# Patient Record
Sex: MALE | Race: WHITE | NOT HISPANIC OR LATINO | Employment: OTHER | URBAN - METROPOLITAN AREA
[De-identification: names, ages, dates, MRNs, and addresses within clinical notes are randomized per-mention and may not be internally consistent; named-entity substitution may affect disease eponyms.]

---

## 2017-01-27 DIAGNOSIS — N40.1 BPH (BENIGN PROSTATIC HYPERTROPHY) WITH URINARY OBSTRUCTION: ICD-10-CM

## 2017-01-27 DIAGNOSIS — N13.8 BPH (BENIGN PROSTATIC HYPERTROPHY) WITH URINARY OBSTRUCTION: ICD-10-CM

## 2017-01-30 RX ORDER — TAMSULOSIN HYDROCHLORIDE 0.4 MG/1
CAPSULE ORAL
Qty: 90 CAPSULE | Refills: 0 | Status: SHIPPED | OUTPATIENT
Start: 2017-01-30 | End: 2017-04-27 | Stop reason: SDUPTHER

## 2017-02-17 ENCOUNTER — OFFICE VISIT (OUTPATIENT)
Dept: PODIATRY | Facility: CLINIC | Age: 82
End: 2017-02-17
Payer: MEDICARE

## 2017-02-17 VITALS
BODY MASS INDEX: 34.71 KG/M2 | RESPIRATION RATE: 18 BRPM | HEIGHT: 68 IN | HEART RATE: 69 BPM | DIASTOLIC BLOOD PRESSURE: 71 MMHG | WEIGHT: 229 LBS | SYSTOLIC BLOOD PRESSURE: 133 MMHG

## 2017-02-17 DIAGNOSIS — M79.671 FOOT PAIN, RIGHT: Primary | ICD-10-CM

## 2017-02-17 DIAGNOSIS — Q82.8 POROKERATOSIS: ICD-10-CM

## 2017-02-17 PROCEDURE — 99213 OFFICE O/P EST LOW 20 MIN: CPT | Mod: 25,S$PBB,, | Performed by: PODIATRIST

## 2017-02-17 PROCEDURE — 99213 OFFICE O/P EST LOW 20 MIN: CPT | Mod: PBBFAC | Performed by: PODIATRIST

## 2017-02-17 PROCEDURE — 29540 STRAPPING ANKLE &/FOOT: CPT | Mod: S$PBB,RT,, | Performed by: PODIATRIST

## 2017-02-17 PROCEDURE — 29540 STRAPPING ANKLE &/FOOT: CPT | Mod: PBBFAC,RT | Performed by: PODIATRIST

## 2017-02-17 PROCEDURE — 99999 PR PBB SHADOW E&M-EST. PATIENT-LVL III: CPT | Mod: PBBFAC,,, | Performed by: PODIATRIST

## 2017-02-17 RX ORDER — UREA 450 MG/G
1 CREAM TOPICAL 2 TIMES DAILY
Qty: 255 G | Refills: 11 | Status: SHIPPED | OUTPATIENT
Start: 2017-02-17 | End: 2017-06-13

## 2017-02-17 NOTE — MR AVS SNAPSHOT
[facility] - Podiatry  [address]  [facility] [address]  Phone: [phone]                  Grey Vance Jr.   2017 1:30 PM   Office Visit    Description:  Male : 1935   Provider:  Sajan Spangler DPM   Department:  [facility]           Reason for Visit     Nail Problem     Ingrown Toenail     Foot Pain           Diagnoses this Visit        Comments    Foot pain, right    -  Primary     Porokeratosis                To Do List           Goals (5 Years of Data)     None      Follow-Up and Disposition     Return if symptoms worsen or fail to improve.       These Medications        Disp Refills Start End    urea 45 % Crea 255 g 11 2017     Apply 1 application topically 2 (two) times daily. - Topical (Top)    Pharmacy: [facility] [id number]  [place] [address] AT [facility] [address] Ph #: [phone]         [facility] On Call     [facility] On Call Nurse Care Line -  Assistance  Registered nurses in the [facility] provide clinical advisement, health education, appointment booking, and other advisory services.  Call for this free service at [phone].             Medications           Message regarding Medications     Verify the changes and/or additions to your medication regime listed below are the same as discussed with your clinician today.  If any of these changes or additions are incorrect, please notify your healthcare provider.        START taking these NEW medications        Refills    urea 45 % Crea 11    Sig: Apply 1 application topically 2 (two) times daily.    Class: Normal    Route: Topical (Top)           Verify that the below list of medications is an accurate representation of the medications you are currently taking.  If none reported, the list may be blank. If incorrect, please contact your healthcare provider. Carry this list with you in case of emergency.           Current Medications     ascorbic acid  "(VITAMIN C) 500 MG tablet Take 500 mg by mouth once daily.    aspirin (ECOTRIN) 81 MG EC tablet Take 81 mg by mouth once daily.    CALCIUM CITRATE/VITAMIN D2 (CALCIUM CITRATE WITH D ORAL) Take by mouth once daily.    CHOLECALCIFEROL, VITAMIN D3, (VITAMIN D3 ORAL) Take 10,000 Units by mouth once a week. Take one weekly    folic acid (FOLVITE) 1 MG tablet Take 1 mg by mouth once daily.    GLUCOSAM SUL NA/CHONDR LATHAM A NA (GLUCOSAMINE-CHONDROITIN 3X ORAL) Take 2 tablets by mouth once daily.    multivitamin (ONE DAILY MULTIVITAMIN) per tablet Take 1 tablet by mouth once daily.    sildenafil (VIAGRA) 100 MG tablet Take 1 tablet (100 mg total) by mouth daily as needed for Erectile Dysfunction.    simvastatin (ZOCOR) 40 MG tablet TAKE 1/2 TABLET BY MOUTH EVERY EVENING    tamsulosin (FLOMAX) 0.4 mg Cp24 TAKE 1 CAPSULE BY MOUTH EVERY EVENING    urea 45 % Crea Apply 1 application topically 2 (two) times daily.           Clinical Reference Information           Your Vitals Were     BP Pulse Resp Height Weight BMI    133/71 69 18 5' 8" (1.727 m) 103.9 kg (229 lb) 34.82 kg/m2      Blood Pressure          Most Recent Value    BP  133/71      Allergies as of 2/17/2017     Bee Pollens      Immunizations Administered on Date of Encounter - 2/17/2017     None      Language Assistance Services     ATTENTION: Language assistance services are available, free of charge. Please call [phone].      ATENCIÓN: Si habla español, tiene a latham disposición servicios gratuitos de asistencia lingüística. Llame al [phone].     BABITA Ý: N?u b?n nói Ti?ng Vi?t, có các d?ch v? h? tr? ngôn ng? mi?n phí dành cho b?n. G?i s? [phone].         [facility] complies with applicable Federal civil rights laws and does not discriminate on the basis of race, color, national origin, age, disability, or sex.        "

## 2017-02-17 NOTE — PROGRESS NOTES
Subjective:      Patient ID: Grey Vance Jr. is a 81 y.o. male.    Chief Complaint: Nail Problem (bilateral great toenails ); Ingrown Toenail; and Foot Pain (Rt heel painful callouse )    burning/sharp pain inferior and central right heel with skin lesion associated.  Denies possible scar from puncture wound, foreign body, and previous surgery to area.  Gradual onset, worsening over past several weeks, aggravated by increased weight bearing, shoe gear, pressure.  No previous medical treatment.  OTC med not helping.        Review of Systems   Constitution: Negative for chills, diaphoresis, fever, malaise/fatigue and night sweats.   Cardiovascular: Negative for claudication, cyanosis, leg swelling and syncope.   Skin: Positive for suspicious lesions. Negative for color change, dry skin, rash and unusual hair distribution.   Musculoskeletal: Negative for falls, joint pain, joint swelling, muscle cramps, muscle weakness and stiffness.   Gastrointestinal: Negative for constipation, diarrhea, nausea and vomiting.   Neurological: Negative for brief paralysis, disturbances in coordination, focal weakness, numbness, paresthesias, sensory change and tremors.           Objective:      Physical Exam   Constitutional: He appears well-developed and well-nourished. He is cooperative. No distress.   Cardiovascular:   Pulses:       Popliteal pulses are 2+ on the right side, and 2+ on the left side.        Dorsalis pedis pulses are 1+ on the right side, and 1+ on the left side.        Posterior tibial pulses are 1+ on the right side, and 1+ on the left side.   Capillary refill 3 seconds all toes/distal feet, all toes/both feet warm to touch.      Negative lymphadenopathy bilateral popliteal fossa and tarsal tunnel.      Negavie lower extremity edema bilateral.     Musculoskeletal:        Right ankle: Normal. He exhibits normal range of motion, no swelling, no ecchymosis, no deformity, no laceration and normal pulse. Achilles  tendon normal. Achilles tendon exhibits no pain, no defect and normal Barton's test results.   Normal angle, base, station of gait. All ten toes without clubbing, cyanosis, or signs of ischemia.  No pain to palpation bilateral lower extremities.  Range of motion, stability, muscle strength, and muscle tone normal bilateral feet and legs.     Lymphadenopathy: No inguinal adenopathy noted on the right or left side.   Negative lymphadenopathy bilateral popliteal fossa and tarsal tunnel.   Neurological: He is alert. He has normal strength. He displays no atrophy and no tremor. No sensory deficit. He exhibits normal muscle tone. He displays no seizure activity. Gait normal.   Reflex Scores:       Patellar reflexes are 2+ on the right side and 2+ on the left side.       Achilles reflexes are 2+ on the right side and 2+ on the left side.  Negative tinel sign to percussion sural, superficial peroneal, deep peroneal, saphenous, and posterior tibial nerves right and left ankles and feet.     Skin: Skin is warm, dry and intact. No abrasion, no bruising, no burn, no ecchymosis, no laceration, no lesion and no rash noted. He is not diaphoretic. No cyanosis or erythema. No pallor. Nails show no clubbing.     Focal hyperkeratotic lesion consisting entirely of hyperkeratotic tissue and deep central hyperkeratotic center without open skin, drainage, pus, fluctuance, malodor, or signs of infection central inferior right heel.    Otherwise, Skin is normal age and health appropriate color, turgor, texture, and temperature bilateral lower extremities without ulceration, hyperpigmentation, discoloration, masses nodules or cords palpated.  No ecchymosis, erythema, edema, or cardinal signs of infection bilateral lower extremities.               Assessment:       Encounter Diagnoses   Name Primary?    Foot pain, right Yes    Porokeratosis          Plan:       Grey was seen today for nail problem, ingrown toenail and foot  pain.    Diagnoses and all orders for this visit:    Foot pain, right    Porokeratosis    Other orders  -     urea 45 % Crea; Apply 1 application topically 2 (two) times daily.      I counseled the patient on his conditions, their implications and medical management.        Rx urea.    Discussed conservative treatment with shoes of adequate dimensions, material, and style to alleviate symptoms and delay or prevent surgical intervention.    I applied a plantar rest strapping to the patient's foot to offload symptomatic area, support the arch, and relieve pain.            Return if symptoms worsen or fail to improve.

## 2017-02-24 DIAGNOSIS — E78.5 HYPERLIPIDEMIA: ICD-10-CM

## 2017-02-24 RX ORDER — SIMVASTATIN 40 MG/1
TABLET, FILM COATED ORAL
Qty: 90 TABLET | Refills: 0 | Status: SHIPPED | OUTPATIENT
Start: 2017-02-24 | End: 2017-08-24 | Stop reason: SDUPTHER

## 2017-03-14 ENCOUNTER — TELEPHONE (OUTPATIENT)
Dept: INTERNAL MEDICINE | Facility: CLINIC | Age: 82
End: 2017-03-14

## 2017-03-15 ENCOUNTER — TELEPHONE (OUTPATIENT)
Dept: INTERNAL MEDICINE | Facility: CLINIC | Age: 82
End: 2017-03-15

## 2017-03-15 DIAGNOSIS — N40.1 BPH (BENIGN PROSTATIC HYPERTROPHY) WITH URINARY OBSTRUCTION: Primary | ICD-10-CM

## 2017-03-15 DIAGNOSIS — Z12.5 SCREENING FOR PROSTATE CANCER: ICD-10-CM

## 2017-03-15 DIAGNOSIS — E78.5 HYPERLIPIDEMIA, UNSPECIFIED HYPERLIPIDEMIA TYPE: ICD-10-CM

## 2017-03-15 DIAGNOSIS — E55.9 MILD VITAMIN D DEFICIENCY: ICD-10-CM

## 2017-03-15 DIAGNOSIS — R73.03 PRE-DIABETES: ICD-10-CM

## 2017-03-15 DIAGNOSIS — N18.30 CKD (CHRONIC KIDNEY DISEASE) STAGE 3, GFR 30-59 ML/MIN: ICD-10-CM

## 2017-03-15 DIAGNOSIS — R73.01 FASTING HYPERGLYCEMIA: ICD-10-CM

## 2017-03-15 DIAGNOSIS — N13.8 BPH (BENIGN PROSTATIC HYPERTROPHY) WITH URINARY OBSTRUCTION: Primary | ICD-10-CM

## 2017-04-05 ENCOUNTER — PATIENT MESSAGE (OUTPATIENT)
Dept: INTERNAL MEDICINE | Facility: CLINIC | Age: 82
End: 2017-04-05

## 2017-04-06 ENCOUNTER — PATIENT MESSAGE (OUTPATIENT)
Dept: INTERNAL MEDICINE | Facility: CLINIC | Age: 82
End: 2017-04-06

## 2017-04-06 NOTE — TELEPHONE ENCOUNTER
Please call patient and tell him to request the medication through his wife's portal as I can not review or order through his portal.

## 2017-04-06 NOTE — TELEPHONE ENCOUNTER
Spoke to patient who stated he tried to access his wife [facility] and could not that was the reason he used his [facility]. I was able to provide him with the phone number to call and get assistance and to resend the message through Mrs. Vance's page.

## 2017-04-27 ENCOUNTER — PATIENT MESSAGE (OUTPATIENT)
Dept: INTERNAL MEDICINE | Facility: CLINIC | Age: 82
End: 2017-04-27

## 2017-04-27 DIAGNOSIS — N13.8 BPH (BENIGN PROSTATIC HYPERTROPHY) WITH URINARY OBSTRUCTION: ICD-10-CM

## 2017-04-27 DIAGNOSIS — N40.1 BPH (BENIGN PROSTATIC HYPERTROPHY) WITH URINARY OBSTRUCTION: ICD-10-CM

## 2017-04-27 RX ORDER — TAMSULOSIN HYDROCHLORIDE 0.4 MG/1
1 CAPSULE ORAL NIGHTLY
Qty: 30 CAPSULE | Refills: 0 | Status: SHIPPED | OUTPATIENT
Start: 2017-04-27 | End: 2017-05-24 | Stop reason: SDUPTHER

## 2017-05-24 DIAGNOSIS — N13.8 BPH (BENIGN PROSTATIC HYPERTROPHY) WITH URINARY OBSTRUCTION: ICD-10-CM

## 2017-05-24 DIAGNOSIS — N40.1 BPH (BENIGN PROSTATIC HYPERTROPHY) WITH URINARY OBSTRUCTION: ICD-10-CM

## 2017-05-24 RX ORDER — TAMSULOSIN HYDROCHLORIDE 0.4 MG/1
CAPSULE ORAL
Qty: 30 CAPSULE | Refills: 0 | Status: SHIPPED | OUTPATIENT
Start: 2017-05-24 | End: 2017-05-24 | Stop reason: SDUPTHER

## 2017-05-25 RX ORDER — TAMSULOSIN HYDROCHLORIDE 0.4 MG/1
CAPSULE ORAL
Qty: 90 CAPSULE | Refills: 0 | Status: SHIPPED | OUTPATIENT
Start: 2017-05-25 | End: 2017-08-24 | Stop reason: SDUPTHER

## 2017-05-29 ENCOUNTER — DOCUMENTATION ONLY (OUTPATIENT)
Dept: INTERNAL MEDICINE | Facility: CLINIC | Age: 82
End: 2017-05-29

## 2017-05-29 NOTE — PROGRESS NOTES
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      PMH:    1. HPL              Simvastatin 20 mg hs              10 yr ASCVD Risk = 24%    2. BPH              Flomax 0.4 mg/day     3. ED      4. Pre-DM    5. Obesity (BMI-34)    6. Hx of Vit D Deficiency              Cholecalciferol 10,000 units/ week      7. CKD Stage 2-3    9. Partial Tear Right Rotator Cuff           Followed by Dr. Haley        10. Left Ulnar Neuropathy - Carpal Tunnel           Evaluated by Dr. Barbi Morfin 8/26/14           Rec: PT and NSAIDS    11. Mild DJD Left Hip        MEDS:            Ascorbic Acid 500 mg/day            ASA 81 mg/day            Calcium + D            Chlecalciferol 10,000 units weekly          Folic Acid 1 mg/day            Glucosamine Chondroitin - 2 daily            MVI            Flomax 0.4 mg/day            Simvastatin 20 mg hs       ALLERGIES:             Bee Pollen    Surgical Hx:             Tonsillectomy             Lipoma on Neck excision             Cataract Extraction OS             Vasectomy     Social Hx:              Smoking Hx: Former smoker. 3 PPD x 20 yrs. Quit 1969              ETOH: none              Diet: "portion-control diet" and avoids desserts              Exercise: Walks 1-2 miles per day              Work Hx: Retired               Home Environment: , 3 children, 4 grandchildren     Family Hx:              Negative       Preventive Health Screening & Recent Lab Results:               Annual PE: 5/3/16              CBC (4/2016): H/H - 13.9/41.6             CMP (4/2016): normal             TSH (3/2015): normal 1.792             Lipids (4/2016): Chol-134, " TG-72, HDL-471 LDL-78             Hgb A1c (9/2014): 5.7                           (4/2016): 5.8             Vit D Level (3/2015): 32             Eye Exam - 4/2015               Colonoscopy - never had, declines             Immunizations                      Influenza 9/2016                      Td - 2006                      Pneumovax - 4/2/14                      Prevnar 13 - 11/3/15                    Zostovax - declined              Other:                      PSA (5/7/13) - 0.91                            (3/2015) - 4.4                            (7/2015) - 1.20                    Uric Acid (4/2014): normal 6.3                    X-Ray Left Hand (8/2014): mild DJD PIP & DIP jts                    X-Ray L-Hip (10/2013): mild DJD                    MRI R-Shoulder (9/2015): partial rotator cuff tear    IMP:  1. HPL   2. Pre-DM  3. BPH  4. Obesity  5. CKD - Stage 3  6. Vit D Deficiency  7. Patial Tear R-Rotator Cuff  8. Mild DJD L-Hip  9. ED  10. L-Ulnar Neuropathy & CTS  11. Annual PE    Plan:  1. Tdp

## 2017-06-08 ENCOUNTER — LAB VISIT (OUTPATIENT)
Dept: LAB | Facility: HOSPITAL | Age: 82
End: 2017-06-08
Attending: INTERNAL MEDICINE
Payer: MEDICARE

## 2017-06-08 DIAGNOSIS — N18.30 CKD (CHRONIC KIDNEY DISEASE) STAGE 3, GFR 30-59 ML/MIN: ICD-10-CM

## 2017-06-08 DIAGNOSIS — R73.01 FASTING HYPERGLYCEMIA: ICD-10-CM

## 2017-06-08 DIAGNOSIS — E55.9 MILD VITAMIN D DEFICIENCY: ICD-10-CM

## 2017-06-08 DIAGNOSIS — E78.5 HYPERLIPIDEMIA, UNSPECIFIED HYPERLIPIDEMIA TYPE: ICD-10-CM

## 2017-06-08 DIAGNOSIS — Z12.5 SCREENING FOR PROSTATE CANCER: ICD-10-CM

## 2017-06-08 LAB
25(OH)D3+25(OH)D2 SERPL-MCNC: 42 NG/ML
ALBUMIN SERPL BCP-MCNC: 3.6 G/DL
ALP SERPL-CCNC: 63 U/L
ALT SERPL W/O P-5'-P-CCNC: 21 U/L
ANION GAP SERPL CALC-SCNC: 10 MMOL/L
AST SERPL-CCNC: 21 U/L
BASOPHILS # BLD AUTO: 0.04 K/UL
BASOPHILS NFR BLD: 0.6 %
BILIRUB SERPL-MCNC: 0.5 MG/DL
BUN SERPL-MCNC: 18 MG/DL
CALCIUM SERPL-MCNC: 9.8 MG/DL
CHLORIDE SERPL-SCNC: 106 MMOL/L
CHOLEST/HDLC SERPL: 3.2 {RATIO}
CO2 SERPL-SCNC: 23 MMOL/L
COMPLEXED PSA SERPL-MCNC: 1.1 NG/ML
CREAT SERPL-MCNC: 1.2 MG/DL
DIFFERENTIAL METHOD: NORMAL
EOSINOPHIL # BLD AUTO: 0.5 K/UL
EOSINOPHIL NFR BLD: 6.6 %
ERYTHROCYTE [DISTWIDTH] IN BLOOD BY AUTOMATED COUNT: 13.1 %
EST. GFR  (AFRICAN AMERICAN): >60 ML/MIN/1.73 M^2
EST. GFR  (NON AFRICAN AMERICAN): 56.8 ML/MIN/1.73 M^2
GLUCOSE SERPL-MCNC: 101 MG/DL
HCT VFR BLD AUTO: 41.6 %
HDL/CHOLESTEROL RATIO: 31.5 %
HDLC SERPL-MCNC: 146 MG/DL
HDLC SERPL-MCNC: 46 MG/DL
HGB BLD-MCNC: 14.2 G/DL
LDLC SERPL CALC-MCNC: 87.2 MG/DL
LYMPHOCYTES # BLD AUTO: 2.3 K/UL
LYMPHOCYTES NFR BLD: 31.8 %
MCH RBC QN AUTO: 30.9 PG
MCHC RBC AUTO-ENTMCNC: 34.1 %
MCV RBC AUTO: 90 FL
MONOCYTES # BLD AUTO: 0.5 K/UL
MONOCYTES NFR BLD: 7.6 %
NEUTROPHILS # BLD AUTO: 3.8 K/UL
NEUTROPHILS NFR BLD: 53.3 %
NONHDLC SERPL-MCNC: 100 MG/DL
PLATELET # BLD AUTO: 279 K/UL
PMV BLD AUTO: 9.9 FL
POTASSIUM SERPL-SCNC: 4.2 MMOL/L
PROT SERPL-MCNC: 7 G/DL
RBC # BLD AUTO: 4.6 M/UL
SODIUM SERPL-SCNC: 139 MMOL/L
TRIGL SERPL-MCNC: 64 MG/DL
TSH SERPL DL<=0.005 MIU/L-ACNC: 2.73 UIU/ML
WBC # BLD AUTO: 7.08 K/UL

## 2017-06-08 PROCEDURE — 80061 LIPID PANEL: CPT

## 2017-06-08 PROCEDURE — 84153 ASSAY OF PSA TOTAL: CPT

## 2017-06-08 PROCEDURE — 36415 COLL VENOUS BLD VENIPUNCTURE: CPT

## 2017-06-08 PROCEDURE — 80053 COMPREHEN METABOLIC PANEL: CPT

## 2017-06-08 PROCEDURE — 84443 ASSAY THYROID STIM HORMONE: CPT

## 2017-06-08 PROCEDURE — 82306 VITAMIN D 25 HYDROXY: CPT

## 2017-06-08 PROCEDURE — 83036 HEMOGLOBIN GLYCOSYLATED A1C: CPT

## 2017-06-08 PROCEDURE — 85025 COMPLETE CBC W/AUTO DIFF WBC: CPT

## 2017-06-09 LAB
ESTIMATED AVG GLUCOSE: 123 MG/DL
HBA1C MFR BLD HPLC: 5.9 %

## 2017-06-13 ENCOUNTER — OFFICE VISIT (OUTPATIENT)
Dept: INTERNAL MEDICINE | Facility: CLINIC | Age: 82
End: 2017-06-13
Payer: MEDICARE

## 2017-06-13 VITALS
DIASTOLIC BLOOD PRESSURE: 72 MMHG | WEIGHT: 227.75 LBS | BODY MASS INDEX: 34.52 KG/M2 | SYSTOLIC BLOOD PRESSURE: 112 MMHG | HEIGHT: 68 IN | HEART RATE: 66 BPM | OXYGEN SATURATION: 98 %

## 2017-06-13 DIAGNOSIS — R73.03 PRE-DIABETES: ICD-10-CM

## 2017-06-13 DIAGNOSIS — R01.1 SYSTOLIC MURMUR: ICD-10-CM

## 2017-06-13 DIAGNOSIS — Z29.9 PREVENTIVE MEASURE: ICD-10-CM

## 2017-06-13 DIAGNOSIS — E55.9 MILD VITAMIN D DEFICIENCY: ICD-10-CM

## 2017-06-13 DIAGNOSIS — N18.30 CKD (CHRONIC KIDNEY DISEASE) STAGE 3, GFR 30-59 ML/MIN: ICD-10-CM

## 2017-06-13 DIAGNOSIS — E66.9 OBESITY (BMI 35.0-39.9 WITHOUT COMORBIDITY): ICD-10-CM

## 2017-06-13 DIAGNOSIS — M16.12 PRIMARY OSTEOARTHRITIS OF LEFT HIP: ICD-10-CM

## 2017-06-13 DIAGNOSIS — E78.5 HYPERLIPIDEMIA, UNSPECIFIED HYPERLIPIDEMIA TYPE: ICD-10-CM

## 2017-06-13 DIAGNOSIS — Z00.00 ANNUAL PHYSICAL EXAM: Primary | ICD-10-CM

## 2017-06-13 DIAGNOSIS — N13.8 BPH (BENIGN PROSTATIC HYPERTROPHY) WITH URINARY OBSTRUCTION: ICD-10-CM

## 2017-06-13 DIAGNOSIS — N40.1 BPH (BENIGN PROSTATIC HYPERTROPHY) WITH URINARY OBSTRUCTION: ICD-10-CM

## 2017-06-13 PROCEDURE — 99999 PR PBB SHADOW E&M-EST. PATIENT-LVL III: CPT | Mod: PBBFAC,,, | Performed by: INTERNAL MEDICINE

## 2017-06-13 PROCEDURE — 1159F MED LIST DOCD IN RCRD: CPT | Mod: ,,, | Performed by: INTERNAL MEDICINE

## 2017-06-13 PROCEDURE — 1126F AMNT PAIN NOTED NONE PRSNT: CPT | Mod: ,,, | Performed by: INTERNAL MEDICINE

## 2017-06-13 PROCEDURE — 99214 OFFICE O/P EST MOD 30 MIN: CPT | Mod: S$PBB,,, | Performed by: INTERNAL MEDICINE

## 2017-06-13 PROCEDURE — 99213 OFFICE O/P EST LOW 20 MIN: CPT | Mod: PBBFAC | Performed by: INTERNAL MEDICINE

## 2017-06-13 NOTE — PROGRESS NOTES
"Subjective:       Patient ID: Grey Vance Jr. is a 82 y.o. male.    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    He has no new complaints. He denies chest pain, SOB, or pedal edema.      PMH:    1. HPL              Simvastatin 20 mg hs              10 yr ASCVD Risk = 24%    2. BPH              Flomax 0.4 mg/day     3. ED      4. Pre-DM    5. Obesity (BMI-34)    6. Hx of Vit D Deficiency              Cholecalciferol 10,000 units/ week      7. CKD Stage 2-3    9. Partial Tear Right Rotator Cuff           Followed by Dr. Haley        10. Left Ulnar Neuropathy - Carpal Tunnel           Evaluated by Dr. Barbi Morfin 8/26/14           Rec: PT and NSAIDS    11. Mild DJD Left Hip        MEDS:            Ascorbic Acid 500 mg/day            ASA 81 mg/day            Calcium + D            Chlecalciferol 10,000 units weekly          Folic Acid 1 mg/day            Glucosamine Chondroitin - 2 daily            MVI            Flomax 0.4 mg/day            Simvastatin 20 mg hs       ALLERGIES:             Bee Pollen    Surgical Hx:             Tonsillectomy             Lipoma on Neck excision             Cataract Extraction OS             Vasectomy     Social Hx:              Smoking Hx: Former smoker. 3 PPD x 20 yrs. Quit 1969              ETOH: none              Diet: "portion-control diet" and avoids desserts              Exercise: Walks 1-2 miles per day              Work Hx: Retired               Home Environment: , 3 children, 4 grandchildren     Family Hx:              Negative       Preventive Health Screening & Recent Lab Results:               Annual PE: 6/13/17             CBC (6/2017): " normal             CMP (6/2017): BUN-18, Cr-1.2, eGFR-56             TSH (6/2017): 2.729             Lipids (6/2017): Chol-146, TG-64, HDL-46, LDL-87             Hgb A1c (9/2014): 5.7                           (4/2016): 5.8                           (6/2017): 5.9             Vit D Level (6/2017): 42             PSA (6/2017): 1.1             Eye Exam - 4/2015               Colonoscopy - never had, declines             Immunizations                      Influenza 9/2016                      Td - 2006                      Pneumovax - 4/2/14                      Prevnar 13 - 11/3/15                    Zostovax - declined              Other:                      PSA (5/7/13) - 0.91                            (3/2015) - 4.4                            (7/2015) - 1.20                    Uric Acid (4/2014): normal 6.3                    X-Ray Left Hand (8/2014): mild DJD PIP & DIP jts                    X-Ray L-Hip (10/2013): mild DJD                    MRI R-Shoulder (9/2015): partial rotator cuff tear            Review of Systems   Constitutional: Negative for fatigue and fever.   HENT: Negative for trouble swallowing.    Respiratory: Negative for cough, chest tightness, shortness of breath and wheezing.    Cardiovascular: Negative for chest pain, palpitations and leg swelling.   Gastrointestinal: Negative for abdominal pain, blood in stool, constipation, diarrhea, nausea and vomiting.   Genitourinary: Negative for difficulty urinating, dysuria and hematuria.   Musculoskeletal: Positive for arthralgias (mild left hip pain). Negative for back pain and neck pain.   Skin: Negative.    Neurological: Negative for dizziness, speech difficulty, weakness, light-headedness and headaches.   Hematological: Negative for adenopathy.   Psychiatric/Behavioral: Negative for dysphoric mood and sleep disturbance.       Objective:      Physical Exam   Constitutional: He is oriented to person, place, and time. He appears well-developed and  well-nourished.   HENT:   Head: Normocephalic.   Right Ear: External ear normal.   Left Ear: External ear normal.   Mouth/Throat: Oropharynx is clear and moist.   Eyes: Conjunctivae are normal. No scleral icterus.   Neck: No JVD present. No thyromegaly present.   Cardiovascular: Normal rate, regular rhythm and intact distal pulses.  Exam reveals no gallop and no S3.    Murmur (2/6 systolic murmur LLSB) heard.  Pulses:       Dorsalis pedis pulses are 2+ on the right side, and 2+ on the left side.   Pulmonary/Chest: Effort normal. No respiratory distress. He has no wheezes. He has no rales.   Abdominal: Soft. He exhibits no mass. There is no tenderness.   Musculoskeletal: He exhibits no edema.   Lymphadenopathy:     He has no cervical adenopathy.   Neurological: He is alert and oriented to person, place, and time.   Skin: Skin is warm and dry.   Psychiatric: He has a normal mood and affect.       Assessment:   1. HPL   2. Pre-DM  3. BPH  4. Obesity  5. CKD - Stage 3  6. Vit D Deficiency  7. Patial Tear R-Rotator Cuff  8. Mild DJD L-Hip  9. ED  10. L-Ulnar Neuropathy & CTS  11. Annual PE  12. Systolic Murmur  Plan:   1. EKG, 2 D Echo  2. RTC prn or in 6 months for follow up

## 2017-06-16 ENCOUNTER — HOSPITAL ENCOUNTER (OUTPATIENT)
Dept: CARDIOLOGY | Facility: CLINIC | Age: 82
Discharge: HOME | End: 2017-06-16
Payer: MEDICARE

## 2017-06-16 DIAGNOSIS — R01.1 SYSTOLIC MURMUR: ICD-10-CM

## 2017-06-16 LAB
ESTIMATED PA SYSTOLIC PRESSURE: 13.76
RETIRED EF AND QEF - SEE NOTES: 60 (ref 55–65)

## 2017-06-16 PROCEDURE — 93306 TTE W/DOPPLER COMPLETE: CPT | Mod: PBBFAC | Performed by: INTERNAL MEDICINE

## 2017-06-16 PROCEDURE — 93010 ELECTROCARDIOGRAM REPORT: CPT | Mod: S$PBB,,, | Performed by: INTERNAL MEDICINE

## 2017-08-11 ENCOUNTER — PATIENT MESSAGE (OUTPATIENT)
Dept: INTERNAL MEDICINE | Facility: CLINIC | Age: 82
End: 2017-08-11

## 2017-08-24 DIAGNOSIS — N13.8 BENIGN PROSTATIC HYPERPLASIA WITH URINARY OBSTRUCTION: ICD-10-CM

## 2017-08-24 DIAGNOSIS — N40.1 BENIGN PROSTATIC HYPERPLASIA WITH URINARY OBSTRUCTION: ICD-10-CM

## 2017-08-24 DIAGNOSIS — E78.5 HYPERLIPIDEMIA: ICD-10-CM

## 2017-08-24 RX ORDER — SIMVASTATIN 40 MG/1
TABLET, FILM COATED ORAL
Qty: 90 TABLET | Refills: 0 | Status: SHIPPED | OUTPATIENT
Start: 2017-08-24 | End: 2018-02-27 | Stop reason: SDUPTHER

## 2017-08-24 RX ORDER — TAMSULOSIN HYDROCHLORIDE 0.4 MG/1
CAPSULE ORAL
Qty: 90 CAPSULE | Refills: 3 | Status: SHIPPED | OUTPATIENT
Start: 2017-08-24 | End: 2018-08-22 | Stop reason: SDUPTHER

## 2017-09-14 ENCOUNTER — PATIENT MESSAGE (OUTPATIENT)
Dept: INTERNAL MEDICINE | Facility: CLINIC | Age: 82
End: 2017-09-14

## 2017-10-04 ENCOUNTER — TELEPHONE (OUTPATIENT)
Dept: INTERNAL MEDICINE | Facility: CLINIC | Age: 82
End: 2017-10-04

## 2017-10-04 DIAGNOSIS — N18.30 CKD (CHRONIC KIDNEY DISEASE) STAGE 3, GFR 30-59 ML/MIN: Primary | ICD-10-CM

## 2017-10-04 DIAGNOSIS — E78.5 HYPERLIPIDEMIA, UNSPECIFIED HYPERLIPIDEMIA TYPE: ICD-10-CM

## 2017-10-04 DIAGNOSIS — R73.03 PRE-DIABETES: ICD-10-CM

## 2017-10-04 NOTE — TELEPHONE ENCOUNTER
He did his extensive lab panel in June.  Only needs a few labs this time. We'll have him do kidney and liver function and A1c.

## 2017-11-20 ENCOUNTER — LAB VISIT (OUTPATIENT)
Dept: LAB | Facility: HOSPITAL | Age: 82
End: 2017-11-20
Attending: INTERNAL MEDICINE
Payer: MEDICARE

## 2017-11-20 DIAGNOSIS — R73.03 PRE-DIABETES: ICD-10-CM

## 2017-11-20 DIAGNOSIS — N18.30 CKD (CHRONIC KIDNEY DISEASE) STAGE 3, GFR 30-59 ML/MIN: ICD-10-CM

## 2017-11-20 LAB
ALBUMIN SERPL BCP-MCNC: 3.8 G/DL
ALP SERPL-CCNC: 69 U/L
ALT SERPL W/O P-5'-P-CCNC: 27 U/L
ANION GAP SERPL CALC-SCNC: 6 MMOL/L
AST SERPL-CCNC: 22 U/L
BILIRUB SERPL-MCNC: 0.5 MG/DL
BUN SERPL-MCNC: 15 MG/DL
CALCIUM SERPL-MCNC: 9.6 MG/DL
CHLORIDE SERPL-SCNC: 107 MMOL/L
CO2 SERPL-SCNC: 26 MMOL/L
CREAT SERPL-MCNC: 1.1 MG/DL
EST. GFR  (AFRICAN AMERICAN): >60 ML/MIN/1.73 M^2
EST. GFR  (NON AFRICAN AMERICAN): >60 ML/MIN/1.73 M^2
ESTIMATED AVG GLUCOSE: 114 MG/DL
GLUCOSE SERPL-MCNC: 100 MG/DL
HBA1C MFR BLD HPLC: 5.6 %
POTASSIUM SERPL-SCNC: 4.5 MMOL/L
PROT SERPL-MCNC: 7.1 G/DL
SODIUM SERPL-SCNC: 139 MMOL/L

## 2017-11-20 PROCEDURE — 83036 HEMOGLOBIN GLYCOSYLATED A1C: CPT

## 2017-11-20 PROCEDURE — 80053 COMPREHEN METABOLIC PANEL: CPT

## 2017-11-20 PROCEDURE — 36415 COLL VENOUS BLD VENIPUNCTURE: CPT

## 2017-11-30 ENCOUNTER — IMMUNIZATION (OUTPATIENT)
Dept: INTERNAL MEDICINE | Facility: CLINIC | Age: 82
End: 2017-11-30
Payer: MEDICARE

## 2017-11-30 ENCOUNTER — OFFICE VISIT (OUTPATIENT)
Dept: INTERNAL MEDICINE | Facility: CLINIC | Age: 82
End: 2017-11-30
Payer: MEDICARE

## 2017-11-30 VITALS
OXYGEN SATURATION: 97 % | HEIGHT: 67 IN | SYSTOLIC BLOOD PRESSURE: 121 MMHG | DIASTOLIC BLOOD PRESSURE: 63 MMHG | WEIGHT: 228 LBS | HEART RATE: 61 BPM | BODY MASS INDEX: 35.79 KG/M2

## 2017-11-30 DIAGNOSIS — E55.9 VITAMIN D DEFICIENCY: ICD-10-CM

## 2017-11-30 DIAGNOSIS — N18.30 CKD (CHRONIC KIDNEY DISEASE) STAGE 3, GFR 30-59 ML/MIN: ICD-10-CM

## 2017-11-30 DIAGNOSIS — Z12.5 SPECIAL SCREENING FOR MALIGNANT NEOPLASM OF PROSTATE: ICD-10-CM

## 2017-11-30 DIAGNOSIS — R73.03 PREDIABETES: ICD-10-CM

## 2017-11-30 DIAGNOSIS — E78.5 HYPERLIPIDEMIA, UNSPECIFIED HYPERLIPIDEMIA TYPE: Primary | ICD-10-CM

## 2017-11-30 DIAGNOSIS — E66.9 OBESITY (BMI 35.0-39.9 WITHOUT COMORBIDITY): ICD-10-CM

## 2017-11-30 PROCEDURE — G0008 ADMIN INFLUENZA VIRUS VAC: HCPCS | Mod: PBBFAC

## 2017-11-30 PROCEDURE — 99999 PR PBB SHADOW E&M-EST. PATIENT-LVL III: CPT | Mod: PBBFAC,,, | Performed by: INTERNAL MEDICINE

## 2017-11-30 PROCEDURE — 99214 OFFICE O/P EST MOD 30 MIN: CPT | Mod: S$PBB,,, | Performed by: INTERNAL MEDICINE

## 2017-11-30 PROCEDURE — 99213 OFFICE O/P EST LOW 20 MIN: CPT | Mod: PBBFAC | Performed by: INTERNAL MEDICINE

## 2018-02-27 DIAGNOSIS — E78.5 HYPERLIPIDEMIA: ICD-10-CM

## 2018-02-27 RX ORDER — SIMVASTATIN 40 MG/1
TABLET, FILM COATED ORAL
Qty: 90 TABLET | Refills: 0 | Status: SHIPPED | OUTPATIENT
Start: 2018-02-27 | End: 2018-09-05 | Stop reason: SDUPTHER

## 2018-03-30 ENCOUNTER — OFFICE VISIT (OUTPATIENT)
Dept: URGENT CARE | Facility: CLINIC | Age: 83
End: 2018-03-30
Payer: MEDICARE

## 2018-03-30 VITALS
BODY MASS INDEX: 33.34 KG/M2 | HEART RATE: 66 BPM | RESPIRATION RATE: 18 BRPM | OXYGEN SATURATION: 97 % | HEIGHT: 68 IN | TEMPERATURE: 98 F | DIASTOLIC BLOOD PRESSURE: 66 MMHG | SYSTOLIC BLOOD PRESSURE: 114 MMHG | WEIGHT: 220 LBS

## 2018-03-30 DIAGNOSIS — J40 BRONCHITIS: Primary | ICD-10-CM

## 2018-03-30 DIAGNOSIS — R05.9 COUGH: ICD-10-CM

## 2018-03-30 PROCEDURE — 99214 OFFICE O/P EST MOD 30 MIN: CPT | Mod: S$GLB,,, | Performed by: FAMILY MEDICINE

## 2018-03-30 RX ORDER — BENZONATATE 200 MG/1
200 CAPSULE ORAL 3 TIMES DAILY PRN
Qty: 30 CAPSULE | Refills: 0 | Status: SHIPPED | OUTPATIENT
Start: 2018-03-30 | End: 2018-04-09

## 2018-03-30 RX ORDER — DOXYCYCLINE 100 MG/1
100 CAPSULE ORAL 2 TIMES DAILY
Qty: 20 CAPSULE | Refills: 0 | Status: SHIPPED | OUTPATIENT
Start: 2018-03-30 | End: 2018-06-07

## 2018-03-30 NOTE — PROGRESS NOTES
"Subjective:       Patient ID: Grey Vance Jr. is a 82 y.o. male.    Vitals:  height is 5' 8" (1.727 m) and weight is 99.8 kg (220 lb). His temperature is 97.7 °F (36.5 °C). His blood pressure is 114/66 and his pulse is 66. His respiration is 18 and oxygen saturation is 97%.     Chief Complaint: Cough    Pt states cough for about a week      Cough   This is a new problem. The current episode started 1 to 4 weeks ago. The problem has been unchanged. The problem occurs hourly. Pertinent negatives include no chest pain, chills, ear pain, eye redness, fever, headaches, myalgias, sore throat, shortness of breath or wheezing. Nothing aggravates the symptoms. He has tried nothing for the symptoms.     Review of Systems   Constitution: Negative for chills, fever and malaise/fatigue.   HENT: Negative for congestion, ear pain, hoarse voice and sore throat.    Eyes: Negative for discharge and redness.   Cardiovascular: Negative for chest pain, dyspnea on exertion and leg swelling.   Respiratory: Positive for cough. Negative for shortness of breath, sputum production and wheezing.    Musculoskeletal: Negative for myalgias.   Gastrointestinal: Negative for abdominal pain and nausea.   Neurological: Negative for headaches.       Objective:      Physical Exam   Constitutional: He is oriented to person, place, and time. He appears well-developed and well-nourished.   HENT:   Head: Normocephalic and atraumatic.   Right Ear: External ear normal.   Left Ear: External ear normal.   Mouth/Throat: Oropharynx is clear and moist.   Eyes: EOM are normal. Pupils are equal, round, and reactive to light.   Neck: Normal range of motion. Neck supple. No JVD present. No tracheal deviation present. No thyromegaly present.   Cardiovascular: Normal rate, regular rhythm and normal heart sounds.  Exam reveals no gallop and no friction rub.    No murmur heard.  Pulmonary/Chest: Breath sounds normal. No respiratory distress. He has no wheezes. He " has no rales. He exhibits no tenderness.   Abdominal: Soft. Bowel sounds are normal. He exhibits no distension and no mass. There is no tenderness. There is no rebound and no guarding. No hernia.   Musculoskeletal: Normal range of motion. He exhibits no edema, tenderness or deformity.   Lymphadenopathy:     He has no cervical adenopathy.   Neurological: He is alert and oriented to person, place, and time. He displays normal reflexes. No cranial nerve deficit. He exhibits normal muscle tone. Coordination normal.   Skin: Skin is warm. Capillary refill takes less than 2 seconds. No rash noted. No erythema. No pallor.   Psychiatric: He has a normal mood and affect. His behavior is normal. Judgment and thought content normal.   Vitals reviewed.      Assessment:       1. Bronchitis    2. Cough        Plan:         Bronchitis  -     doxycycline (VIBRAMYCIN) 100 MG Cap; Take 1 capsule (100 mg total) by mouth 2 (two) times daily.  Dispense: 20 capsule; Refill: 0    Cough  -     benzonatate (TESSALON) 200 MG capsule; Take 1 capsule (200 mg total) by mouth 3 (three) times daily as needed for Cough.  Dispense: 30 capsule; Refill: 0      Follow up with your doctor in a few days as needed.  Return to the urgent care or go to the ER if symptoms get worse.    Osmany Simon MD

## 2018-03-30 NOTE — PATIENT INSTRUCTIONS
What Is Acute Bronchitis?  Acute bronchitis is when the airways in your lungs (bronchial tubes) become red and swollen (inflamed). It is usually caused by a viral infection. But it can also occur because of a bacteria or allergen. Symptoms include a cough that produces yellow or greenish mucus and can last for days or sometimes weeks.  Inside healthy lungs    Air travels in and out of the lungs through the airways. The linings of these airways produce sticky mucus. This mucus traps particles that enter the lungs. Tiny structures called cilia then sweep the particles out of the airways.     Healthy airway: Airways are normally open. Air moves in and out easily.      Healthy cilia: Tiny, hairlike cilia sweep mucus and particles up and out of the airways.   Lungs with bronchitis  Bronchitis often occurs with a cold or the flu virus. The airways become inflamed (red and swollen). There is a deep hacking cough from the extra mucus. Other symptoms may include:  · Wheezing  · Chest discomfort  · Shortness of breath  · Mild fever  A second infection, this time due to bacteria, may then occur. And airways irritated by allergens or smoke are more likely to get infected.        Inflamed airway: Inflammation and extra mucus narrow the airway, causing shortness of breath.      Impaired cilia: Extra mucus impairs cilia, causing congestion and wheezing. Smoking makes the problem worse.   Making a diagnosis  A physical exam, health history, and certain tests help your healthcare provider make the diagnosis.  Health history  Your healthcare provider will ask you about your symptoms.  The exam  Your provider listens to your chest for signs of congestion. He or she may also check your ears, nose, and throat.  Possible tests  · A sputum test for bacteria. This requires a sample of mucus from your lungs.  · A nasal or throat swab. This tests to see if you have a bacterial infection.  · A chest X-ray. This is done if your healthcare  provider thinks you have pneumonia.  · Tests to check for an underlying condition. Other tests may be done to check for things such as allergies, asthma, or COPD (chronic obstructive pulmonary disease). You may need to see a specialist for more lung function testing.  Treating a cough  The main treatment for bronchitis is easing symptoms. Avoiding smoke, allergens, and other things that trigger coughing can often help. If the infection is bacterial, you may be given antibiotics. During the illness, it's important to get plenty of sleep. To ease symptoms:  · Dont smoke. Also avoid secondhand smoke.  · Use a humidifier. Or try breathing in steam from a hot shower. This may help loosen mucus.  · Drink a lot of water and juice. They can soothe the throat and may help thin mucus.  · Sit up or use extra pillows when in bed. This helps to lessen coughing and congestion.  · Ask your provider about using medicine. Ask about using cough medicine, pain and fever medicine, or a decongestant.  Antibiotics  Most cases of bronchitis are caused by cold or flu viruses. They dont need antibiotics to treat them, even if your mucus is thick and green or yellow. Antibiotics dont treat viral illness and antibiotics have not been shown to have any benefit in cases of acute bronchitis. Taking antibiotics when they are not needed increases your risk of getting an infection later that is antibiotic-resistant. Antibiotics can also cause severe cases of diarrhea that require other antibiotics to treat.  It is important that you accept your healthcare provider's opinion to not use antibiotics. Your provider will prescribe antibiotics if the infection is caused by bacteria. If they are prescribed:  · Take all of the medicine. Take the medicine until it is used up, even if symptoms have improved. If you dont, the bronchitis may come back.  · Take the medicines as directed. For instance, some medicines should be taken with food.  · Ask about  side effects. Ask your provider or pharmacist what side effects are common, and what to do about them.  Follow-up care  You should see your provider again in 2 to 3 weeks. By this time, symptoms should have improved. An infection that lasts longer may mean you have a more serious problem.  Prevention  · Avoid tobacco smoke. If you smoke, quit. Stay away from smoky places. Ask friends and family not to smoke around you, or in your home or car.  · Get checked for allergies.  · Ask your provider about getting a yearly flu shot. Also ask about pneumococcal or pneumonia shots.  · Wash your hands often. This helps reduce the chance of picking up viruses that cause colds and flu.  Call your healthcare provider if:  · Symptoms worsen, or you have new symptoms  · Breathing problems worsen or  become severe  · Symptoms dont get better within a week, or within 3 days of taking antibiotics   Date Last Reviewed: 2/1/2017  © 9082-6370 [employer]. [address] [place] [address]. All rights reserved. This information is not intended as a substitute for professional medical care. Always follow your healthcare professional's instructions.    Follow up with your doctor in a few days as needed.  Return to the urgent care or go to the ER if symptoms get worse.    Osmany Simon MD

## 2018-05-29 ENCOUNTER — TELEPHONE (OUTPATIENT)
Dept: INTERNAL MEDICINE | Facility: CLINIC | Age: 83
End: 2018-05-29

## 2018-05-29 NOTE — TELEPHONE ENCOUNTER
Spoke with pt. Pt would like to know if he needs to have labs done prior to his appointment on 06/07? Please adv. If so, I will contact pt to schedule.

## 2018-05-29 NOTE — TELEPHONE ENCOUNTER
----- Message from Shelia Fay sent at 5/29/2018  2:00 PM CDT -----  Contact: self/[phone]  Pt would like to know it he needs to have lab work done for his appt on 06/07.

## 2018-06-06 ENCOUNTER — LAB VISIT (OUTPATIENT)
Dept: LAB | Facility: HOSPITAL | Age: 83
End: 2018-06-06
Attending: INTERNAL MEDICINE
Payer: MEDICARE

## 2018-06-06 DIAGNOSIS — E78.5 HYPERLIPIDEMIA, UNSPECIFIED HYPERLIPIDEMIA TYPE: ICD-10-CM

## 2018-06-06 DIAGNOSIS — N18.30 CKD (CHRONIC KIDNEY DISEASE) STAGE 3, GFR 30-59 ML/MIN: ICD-10-CM

## 2018-06-06 DIAGNOSIS — E55.9 VITAMIN D DEFICIENCY: ICD-10-CM

## 2018-06-06 DIAGNOSIS — Z12.5 SPECIAL SCREENING FOR MALIGNANT NEOPLASM OF PROSTATE: ICD-10-CM

## 2018-06-06 DIAGNOSIS — E66.9 OBESITY (BMI 35.0-39.9 WITHOUT COMORBIDITY): ICD-10-CM

## 2018-06-06 DIAGNOSIS — R73.03 PREDIABETES: ICD-10-CM

## 2018-06-06 LAB
25(OH)D3+25(OH)D2 SERPL-MCNC: 37 NG/ML
ALBUMIN SERPL BCP-MCNC: 3.9 G/DL
ALP SERPL-CCNC: 61 U/L
ALT SERPL W/O P-5'-P-CCNC: 24 U/L
ANION GAP SERPL CALC-SCNC: 8 MMOL/L
AST SERPL-CCNC: 20 U/L
BASOPHILS # BLD AUTO: 0.05 K/UL
BASOPHILS NFR BLD: 0.7 %
BILIRUB SERPL-MCNC: 0.5 MG/DL
BUN SERPL-MCNC: 18 MG/DL
CALCIUM SERPL-MCNC: 9.3 MG/DL
CHLORIDE SERPL-SCNC: 107 MMOL/L
CHOLEST SERPL-MCNC: 137 MG/DL
CHOLEST/HDLC SERPL: 2.9 {RATIO}
CO2 SERPL-SCNC: 24 MMOL/L
COMPLEXED PSA SERPL-MCNC: 1.1 NG/ML
CREAT SERPL-MCNC: 1.2 MG/DL
DIFFERENTIAL METHOD: ABNORMAL
EOSINOPHIL # BLD AUTO: 0.4 K/UL
EOSINOPHIL NFR BLD: 5.1 %
ERYTHROCYTE [DISTWIDTH] IN BLOOD BY AUTOMATED COUNT: 13.6 %
EST. GFR  (AFRICAN AMERICAN): >60 ML/MIN/1.73 M^2
EST. GFR  (NON AFRICAN AMERICAN): 56 ML/MIN/1.73 M^2
ESTIMATED AVG GLUCOSE: 117 MG/DL
GLUCOSE SERPL-MCNC: 105 MG/DL
HBA1C MFR BLD HPLC: 5.7 %
HCT VFR BLD AUTO: 41.5 %
HDLC SERPL-MCNC: 48 MG/DL
HDLC SERPL: 35 %
HGB BLD-MCNC: 14.2 G/DL
LDLC SERPL CALC-MCNC: 77.4 MG/DL
LYMPHOCYTES # BLD AUTO: 2.2 K/UL
LYMPHOCYTES NFR BLD: 30.3 %
MCH RBC QN AUTO: 31.3 PG
MCHC RBC AUTO-ENTMCNC: 34.2 G/DL
MCV RBC AUTO: 92 FL
MONOCYTES # BLD AUTO: 0.5 K/UL
MONOCYTES NFR BLD: 6.5 %
NEUTROPHILS # BLD AUTO: 4.2 K/UL
NEUTROPHILS NFR BLD: 57.3 %
NONHDLC SERPL-MCNC: 89 MG/DL
PLATELET # BLD AUTO: 276 K/UL
PMV BLD AUTO: 10.1 FL
POTASSIUM SERPL-SCNC: 4.3 MMOL/L
PROT SERPL-MCNC: 7 G/DL
RBC # BLD AUTO: 4.53 M/UL
SODIUM SERPL-SCNC: 139 MMOL/L
TRIGL SERPL-MCNC: 58 MG/DL
TSH SERPL DL<=0.005 MIU/L-ACNC: 2.19 UIU/ML
WBC # BLD AUTO: 7.4 K/UL

## 2018-06-06 PROCEDURE — 82306 VITAMIN D 25 HYDROXY: CPT

## 2018-06-06 PROCEDURE — 80061 LIPID PANEL: CPT

## 2018-06-06 PROCEDURE — 84443 ASSAY THYROID STIM HORMONE: CPT

## 2018-06-06 PROCEDURE — 84153 ASSAY OF PSA TOTAL: CPT

## 2018-06-06 PROCEDURE — 80053 COMPREHEN METABOLIC PANEL: CPT

## 2018-06-06 PROCEDURE — 85025 COMPLETE CBC W/AUTO DIFF WBC: CPT

## 2018-06-06 PROCEDURE — 36415 COLL VENOUS BLD VENIPUNCTURE: CPT

## 2018-06-06 PROCEDURE — 83036 HEMOGLOBIN GLYCOSYLATED A1C: CPT

## 2018-06-07 ENCOUNTER — OFFICE VISIT (OUTPATIENT)
Dept: INTERNAL MEDICINE | Facility: CLINIC | Age: 83
End: 2018-06-07
Payer: MEDICARE

## 2018-06-07 VITALS
DIASTOLIC BLOOD PRESSURE: 69 MMHG | BODY MASS INDEX: 34.71 KG/M2 | OXYGEN SATURATION: 97 % | WEIGHT: 229 LBS | HEART RATE: 77 BPM | SYSTOLIC BLOOD PRESSURE: 121 MMHG | HEIGHT: 68 IN

## 2018-06-07 DIAGNOSIS — E66.9 OBESITY (BMI 30.0-34.9): ICD-10-CM

## 2018-06-07 DIAGNOSIS — E78.5 HYPERLIPIDEMIA, UNSPECIFIED HYPERLIPIDEMIA TYPE: Primary | ICD-10-CM

## 2018-06-07 DIAGNOSIS — E55.9 MILD VITAMIN D DEFICIENCY: ICD-10-CM

## 2018-06-07 DIAGNOSIS — N18.30 CKD (CHRONIC KIDNEY DISEASE) STAGE 3, GFR 30-59 ML/MIN: ICD-10-CM

## 2018-06-07 DIAGNOSIS — R73.03 PREDIABETES: ICD-10-CM

## 2018-06-07 PROCEDURE — 99999 PR PBB SHADOW E&M-EST. PATIENT-LVL III: CPT | Mod: PBBFAC,,, | Performed by: INTERNAL MEDICINE

## 2018-06-07 PROCEDURE — 99213 OFFICE O/P EST LOW 20 MIN: CPT | Mod: PBBFAC | Performed by: INTERNAL MEDICINE

## 2018-06-07 PROCEDURE — 99214 OFFICE O/P EST MOD 30 MIN: CPT | Mod: S$PBB,,, | Performed by: INTERNAL MEDICINE

## 2018-06-07 NOTE — PROGRESS NOTES
"Subjective:       Patient ID: Grey Vance Jr. is a 83 y.o. male.    Chief Complaint: Follow-up (6 month follow up.)    HPI   84 yo M here for follow up of the following issues:    1. HPL              Simvastatin 20 mg hs              10 yr ASCVD Risk = 24% at diagnosis   June 2018 - LDL 77     2. BPH              Flomax 0.4 mg/day      3. ED       4. Pre-DM    June 2018: 5.7%    5. Obesity (BMI-34)     6. 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 Partial Tear Right Rotator Cuff           Followed by Dr. Haley         10. Left Ulnar Neuropathy - Carpal Tunnel           Evaluated by Dr. Barbi Morfin 8/26/14           Rec: PT and NSAIDS     11. 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    Review of Systems   Constitutional: Negative for fever.   Respiratory: Negative for shortness of breath.    Cardiovascular: Negative for chest pain.   Musculoskeletal: Negative.    Skin: Negative.        Objective:   /69 (BP Location: Right arm, Patient Position: Sitting, BP Method: Large (Manual))   Pulse 77   Ht 5' 8" (1.727 m)   Wt 103.9 kg (229 lb)   SpO2 97%   BMI 34.82 kg/m²      Physical Exam   Constitutional: He is oriented to person, place, and time. He appears well-developed and well-nourished. No distress.   HENT:   Head: Normocephalic and atraumatic.   Cardiovascular: Normal rate and regular rhythm.    No murmur heard.  Pulmonary/Chest: Effort normal. No respiratory distress. He has no wheezes. He has no rales.   Neurological: He is alert and oriented to person, place, and time.   Skin: Skin is warm and dry. He is not diaphoretic.   Psychiatric: He has a normal mood and affect. His behavior is normal.       Assessment:       1. Hyperlipidemia, unspecified hyperlipidemia type    2. CKD (chronic " kidney disease) stage 3, GFR 30-59 ml/min    3. Mild vitamin D deficiency    4. Obesity (BMI 30.0-34.9)        Plan:       Grey was seen today for follow-up.    Diagnoses and all orders for this visit:    Hyperlipidemia, unspecified hyperlipidemia type  Doing well, continue current medication    CKD (chronic kidney disease) stage 3, GFR 30-59 ml/min  Stable    Mild vitamin D deficiency  At goal    Obesity (BMI 30.0-34.9)  Stable    Discussed his role as primary caregiver for his wife. He is handling it well. He has additional help if needed.

## 2018-08-10 ENCOUNTER — TELEPHONE (OUTPATIENT)
Dept: INTERNAL MEDICINE | Facility: CLINIC | Age: 83
End: 2018-08-10

## 2018-08-10 NOTE — TELEPHONE ENCOUNTER
----- Message from Cindy Haas sent at 8/10/2018  3:44 PM CDT -----  Doctor appointment and lab have been scheduled.  Please link lab orders to the lab appointment.  Date of doctor appointment:  12/3  Physical or EP:  epp  Date of lab appointment:  11/26  Comments:

## 2018-08-14 ENCOUNTER — TELEPHONE (OUTPATIENT)
Dept: INTERNAL MEDICINE | Facility: CLINIC | Age: 83
End: 2018-08-14

## 2018-08-14 NOTE — TELEPHONE ENCOUNTER
----- Message from Ariane Patrick RN sent at 8/14/2018 10:09 AM CDT -----  Contact: self/[phone]      ----- Message -----  From: Estefania Rios  Sent: 8/14/2018   8:34 AM  To: Jed Wesley Staff    Patient called in regards needing to find out when his tetanus shot was given. Please call and advise. Thank you!!!

## 2018-08-22 DIAGNOSIS — N13.8 BENIGN PROSTATIC HYPERPLASIA WITH URINARY OBSTRUCTION: ICD-10-CM

## 2018-08-22 DIAGNOSIS — N40.1 BENIGN PROSTATIC HYPERPLASIA WITH URINARY OBSTRUCTION: ICD-10-CM

## 2018-08-22 RX ORDER — TAMSULOSIN HYDROCHLORIDE 0.4 MG/1
1 CAPSULE ORAL NIGHTLY
Qty: 90 CAPSULE | Refills: 3 | Status: SHIPPED | OUTPATIENT
Start: 2018-08-22 | End: 2019-09-10 | Stop reason: SDUPTHER

## 2018-09-05 DIAGNOSIS — E78.5 HYPERLIPIDEMIA: ICD-10-CM

## 2018-09-05 RX ORDER — SIMVASTATIN 40 MG/1
TABLET, FILM COATED ORAL
Qty: 90 TABLET | Refills: 3 | Status: SHIPPED | OUTPATIENT
Start: 2018-09-05 | End: 2019-09-10 | Stop reason: SDUPTHER

## 2018-10-24 ENCOUNTER — LAB VISIT (OUTPATIENT)
Dept: LAB | Facility: HOSPITAL | Age: 83
End: 2018-10-24
Attending: INTERNAL MEDICINE
Payer: MEDICARE

## 2018-10-24 DIAGNOSIS — R73.03 PREDIABETES: ICD-10-CM

## 2018-10-24 DIAGNOSIS — N18.30 CKD (CHRONIC KIDNEY DISEASE) STAGE 3, GFR 30-59 ML/MIN: ICD-10-CM

## 2018-10-24 LAB
ALBUMIN SERPL BCP-MCNC: 3.9 G/DL
ALP SERPL-CCNC: 65 U/L
ALT SERPL W/O P-5'-P-CCNC: 20 U/L
ANION GAP SERPL CALC-SCNC: 5 MMOL/L
AST SERPL-CCNC: 18 U/L
BILIRUB SERPL-MCNC: 0.7 MG/DL
BUN SERPL-MCNC: 17 MG/DL
CALCIUM SERPL-MCNC: 9.7 MG/DL
CHLORIDE SERPL-SCNC: 107 MMOL/L
CO2 SERPL-SCNC: 26 MMOL/L
CREAT SERPL-MCNC: 1.1 MG/DL
EST. GFR  (AFRICAN AMERICAN): >60 ML/MIN/1.73 M^2
EST. GFR  (NON AFRICAN AMERICAN): >60 ML/MIN/1.73 M^2
ESTIMATED AVG GLUCOSE: 117 MG/DL
GLUCOSE SERPL-MCNC: 105 MG/DL
HBA1C MFR BLD HPLC: 5.7 %
POTASSIUM SERPL-SCNC: 4.4 MMOL/L
PROT SERPL-MCNC: 7.3 G/DL
SODIUM SERPL-SCNC: 138 MMOL/L

## 2018-10-24 PROCEDURE — 80053 COMPREHEN METABOLIC PANEL: CPT

## 2018-10-24 PROCEDURE — 83036 HEMOGLOBIN GLYCOSYLATED A1C: CPT

## 2018-10-24 PROCEDURE — 36415 COLL VENOUS BLD VENIPUNCTURE: CPT

## 2018-12-03 ENCOUNTER — OFFICE VISIT (OUTPATIENT)
Dept: INTERNAL MEDICINE | Facility: CLINIC | Age: 83
End: 2018-12-03
Payer: MEDICARE

## 2018-12-03 ENCOUNTER — IMMUNIZATION (OUTPATIENT)
Dept: INTERNAL MEDICINE | Facility: CLINIC | Age: 83
End: 2018-12-03
Payer: MEDICARE

## 2018-12-03 VITALS
DIASTOLIC BLOOD PRESSURE: 70 MMHG | SYSTOLIC BLOOD PRESSURE: 130 MMHG | BODY MASS INDEX: 35.01 KG/M2 | OXYGEN SATURATION: 93 % | HEIGHT: 68 IN | WEIGHT: 231 LBS | HEART RATE: 64 BPM

## 2018-12-03 DIAGNOSIS — E78.5 HYPERLIPIDEMIA, UNSPECIFIED HYPERLIPIDEMIA TYPE: ICD-10-CM

## 2018-12-03 DIAGNOSIS — E66.9 OBESITY (BMI 30.0-34.9): ICD-10-CM

## 2018-12-03 DIAGNOSIS — R73.03 PRE-DIABETES: Primary | ICD-10-CM

## 2018-12-03 DIAGNOSIS — E55.9 MILD VITAMIN D DEFICIENCY: ICD-10-CM

## 2018-12-03 PROCEDURE — 99213 OFFICE O/P EST LOW 20 MIN: CPT | Mod: PBBFAC | Performed by: INTERNAL MEDICINE

## 2018-12-03 PROCEDURE — 99214 OFFICE O/P EST MOD 30 MIN: CPT | Mod: S$PBB,,, | Performed by: INTERNAL MEDICINE

## 2018-12-03 PROCEDURE — 90662 IIV NO PRSV INCREASED AG IM: CPT | Mod: PBBFAC

## 2018-12-03 PROCEDURE — 99999 PR PBB SHADOW E&M-EST. PATIENT-LVL III: CPT | Mod: PBBFAC,,, | Performed by: INTERNAL MEDICINE

## 2018-12-03 NOTE — PROGRESS NOTES
"Subjective:       Patient ID: Grey Vance Jr. is a 83 y.o. male.    Chief Complaint: Follow-up (6 month follow up. patient is also requesting flu vaccine, if he is due.)    HPI   82 yo M here for follow up of HLD, CKD3, predm.     1. HPL              Simvastatin 20 mg hs              10 yr ASCVD Risk = 24% at diagnosis              June 2018 - LDL 77     2. BPH              Flomax 0.4 mg/day      3. ED       4. Pre-DM              nov 2018: 5.7%     5. Obesity (BMI-34)     6. 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 Partial Tear Right Rotator Cuff           Followed by Dr. Haley         10. Left Ulnar Neuropathy - Carpal Tunnel           Evaluated by Dr. Barbi Morfin 8/26/14           Rec: PT and NSAIDS     11. 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  Review of Systems   Constitutional: Negative for fever.   HENT: Negative.    Eyes: Negative.    Respiratory: Negative for shortness of breath.    Cardiovascular: Negative for chest pain and leg swelling.   Gastrointestinal: Negative for abdominal pain, diarrhea, nausea and vomiting.   Genitourinary: Negative.    Musculoskeletal: Negative for arthralgias.        Base of right thumb aching in am, improves over course of day (left handed)   Skin: Negative for rash.   Psychiatric/Behavioral: Negative.        Objective:   /70 (BP Location: Right arm, Patient Position: Sitting, BP Method: Large (Manual))   Pulse 64   Ht 5' 8" (1.727 m)   Wt 104.8 kg (231 lb)   SpO2 (!) 93%   BMI 35.12 kg/m²      Physical Exam   Constitutional: He is oriented to person, place, and time. He appears well-developed and well-nourished. No distress.   HENT:   Head: Normocephalic and atraumatic.   Cardiovascular: Normal rate and regular " rhythm.   No murmur heard.  Pulmonary/Chest: Effort normal. No respiratory distress. He has no wheezes. He has no rales.   Neurological: He is alert and oriented to person, place, and time.   Skin: Skin is warm and dry. He is not diaphoretic.   Psychiatric: He has a normal mood and affect. His behavior is normal.       Assessment:       1. Pre-diabetes    2. Obesity (BMI 30.0-34.9)    3. Hyperlipidemia, unspecified hyperlipidemia type        Plan:       Grey was seen today for follow-up.    Diagnoses and all orders for this visit:    Pre-diabetes  Obesity (BMI 30.0-34.9)  Hyperlipidemia, unspecified hyperlipidemia type  Labs reviewed, at goal  Continue current meds    Flu vaccine today  Labs in 6 mo

## 2019-03-31 ENCOUNTER — OFFICE VISIT (OUTPATIENT)
Dept: URGENT CARE | Facility: CLINIC | Age: 84
End: 2019-03-31
Payer: MEDICARE

## 2019-03-31 VITALS
HEART RATE: 66 BPM | WEIGHT: 225 LBS | HEIGHT: 67 IN | SYSTOLIC BLOOD PRESSURE: 97 MMHG | BODY MASS INDEX: 35.31 KG/M2 | DIASTOLIC BLOOD PRESSURE: 60 MMHG | OXYGEN SATURATION: 95 % | RESPIRATION RATE: 19 BRPM | TEMPERATURE: 98 F

## 2019-03-31 DIAGNOSIS — K52.9 GASTROENTERITIS: Primary | ICD-10-CM

## 2019-03-31 PROCEDURE — 99214 OFFICE O/P EST MOD 30 MIN: CPT | Mod: S$GLB,,, | Performed by: FAMILY MEDICINE

## 2019-03-31 PROCEDURE — 99214 PR OFFICE/OUTPT VISIT, EST, LEVL IV, 30-39 MIN: ICD-10-PCS | Mod: S$GLB,,, | Performed by: FAMILY MEDICINE

## 2019-03-31 RX ORDER — DIPHENOXYLATE HYDROCHLORIDE AND ATROPINE SULFATE 2.5; .025 MG/1; MG/1
1 TABLET ORAL 3 TIMES DAILY PRN
Qty: 20 TABLET | Refills: 0 | Status: SHIPPED | OUTPATIENT
Start: 2019-03-31 | End: 2019-04-07

## 2019-03-31 NOTE — PATIENT INSTRUCTIONS

## 2019-03-31 NOTE — PROGRESS NOTES
"Subjective:       Patient ID: Grey Vance Jr. is a 83 y.o. male.    Vitals:  height is 5' 7" (1.702 m) and weight is 102.1 kg (225 lb). His oral temperature is 98 °F (36.7 °C). His blood pressure is 97/60 and his pulse is 66. His respiration is 19 and oxygen saturation is 95%.     Chief Complaint: Diarrhea    Diarrhea    This is a new problem. The current episode started in the past 7 days (thursday). The problem occurs more than 10 times per day. The problem has been unchanged. The stool consistency is described as watery. The patient states that diarrhea awakens him from sleep. Pertinent negatives include no abdominal pain, arthralgias, bloating, chills, coughing, fever, headaches, increased  flatus, myalgias, sweats, URI, vomiting or weight loss. Nothing aggravates the symptoms. There are no known risk factors. He has tried nothing for the symptoms. The treatment provided no relief. There is no history of bowel resection, inflammatory bowel disease, irritable bowel syndrome, malabsorption, a recent abdominal surgery or short gut syndrome.   Emesis    This is a new problem. The current episode started in the past 7 days (thursday). The problem occurs 2 to 4 times per day. The problem has been resolved. There has been no fever. Pertinent negatives include no abdominal pain, arthralgias, chest pain, chills, coughing, decreased urine volume, diarrhea, dizziness, fever, headaches, myalgias, sweats, URI or weight loss. Treatments tried: zofran. The treatment provided no relief.       Constitution: Negative for chills, fatigue and fever.   HENT: Negative for congestion and sore throat.    Neck: Negative for painful lymph nodes.   Cardiovascular: Negative for chest pain and leg swelling.   Eyes: Negative for double vision and blurred vision.   Respiratory: Negative for cough and shortness of breath.    Gastrointestinal: Negative for abdominal pain, nausea, vomiting and diarrhea.   Genitourinary: Negative for " dysuria, frequency, urgency and urine decreased.   Musculoskeletal: Negative for joint pain, joint swelling, muscle cramps and muscle ache.   Skin: Negative for color change, pale, rash and erythema.   Allergic/Immunologic: Negative for seasonal allergies.   Neurological: Negative for dizziness, history of vertigo, light-headedness, passing out and headaches.   Hematologic/Lymphatic: Negative for swollen lymph nodes, easy bruising/bleeding and history of blood clots. Does not bruise/bleed easily.   Psychiatric/Behavioral: Negative for nervous/anxious, sleep disturbance and depression. The patient is not nervous/anxious.        Objective:      Physical Exam   Constitutional: He is oriented to person, place, and time. He appears well-developed and well-nourished.   HENT:   Head: Normocephalic and atraumatic.   Eyes: Pupils are equal, round, and reactive to light. EOM are normal.   Neck: Normal range of motion. Neck supple.   Cardiovascular: Normal rate, regular rhythm and normal heart sounds.   No murmur heard.  Pulmonary/Chest: Breath sounds normal. No respiratory distress. He has no wheezes. He has no rales.   Abdominal: Soft. Bowel sounds are normal. He exhibits no distension. There is no tenderness. There is no rebound and no guarding.   Musculoskeletal: Normal range of motion. He exhibits no edema, tenderness or deformity.   Lymphadenopathy:     He has no cervical adenopathy.   Neurological: He is alert and oriented to person, place, and time. No cranial nerve deficit. He exhibits normal muscle tone. Coordination normal.   Skin: Skin is warm. Capillary refill takes less than 2 seconds. No rash noted. No erythema. No pallor.   Psychiatric: He has a normal mood and affect. His behavior is normal. Judgment and thought content normal.   Vitals reviewed.      Assessment:       1. Gastroenteritis        Plan:         Gastroenteritis  -     diphenoxylate-atropine 2.5-0.025 mg (LOMOTIL) 2.5-0.025 mg per tablet; Take 1  tablet by mouth 3 (three) times daily as needed for Diarrhea.  Dispense: 20 tablet; Refill: 0          Patient Instructions     Uncertain Causes of Diarrhea (Adult)    Diarrhea is when stools are loose and watery. This can be caused by:  · Viral infections  · Bacterial infections  · Food poisoning  · Parasites  · Irritable bowel syndrome (IBS)  · Inflammatory bowel diseases such as ulcerative colitis, Crohn's disease, and celiac disease  · Food intolerance, such as to lactose, the sugar found in milk and milk products  · Reaction to medicines like antibiotics, laxatives, cancer drugs, and antacids  Along with diarrhea, you may also have:  · Abdominal pain and cramping  · Nausea and vomiting  · Loss of bowel control  · Fever and chills  · Bloody stools  In some cases, antibiotics may help to treat diarrhea. You may have a stool sample test. This is done to see what is causing your diarrhea, and if antibiotics will help treat it. The results of a stool sample test may take up to 2 days. The healthcare provider may not give you antibiotics until he or she has the stool test results.  Diarrhea can cause dehydration. This is the loss of too much water and other fluids from the body. When this occurs, body fluid must be replaced. This can be done with oral rehydration solutions. Oral rehydration solutions are available at drugstores and grocery stores without a prescription.  Home care  Follow all instructions given by your healthcare provider. Rest at home for the next 24 hours, or until you feel better. Avoid caffeine, tobacco, and alcohol. These can make diarrhea, cramping, and pain worse.  If taking medicines:  · Dont take over-the-counter diarrhea or nausea medicines unless your healthcare provider tells you to.  · You may use acetaminophen or NSAID medicines like ibuprofen or naproxen to reduce pain and fever. Dont use these if you have chronic liver or kidney disease, or ever had a stomach ulcer or  gastrointestinal bleeding. Don't use NSAID medicines if you are already taking one for another condition (like arthritis) or are on daily aspirin therapy (such as for heart disease or after a stroke). Talk with your healthcare provider first.  · If antibiotics were prescribed, be sure you take them until they are finished. Dont stop taking them even when you feel better. Antibiotics must be taken as a full course.  To prevent the spread of illness:  · Remember that washing with soap and water and using alcohol-based  is the best way to prevent the spread of infection.  · Clean the toilet after each use.  · Wash your hands before eating.  · Wash your hands before and after preparing food. Keep in mind that people with diarrhea or vomiting should not prepare food for others.  · Wash your hands after using cutting boards, countertops, and knives that have been in contact with raw foods.  · Wash and then peel fruits and vegetables.  · Keep uncooked meats away from cooked and ready-to-eat foods.  · Use a food thermometer when cooking. Cook poultry to at least 165°F (74°C). Cook ground meat (beef, veal, pork, lamb) to at least 160°F (71°C). Cook fresh beef, veal, lamb, and pork to at least 145°F (63°C).  · Dont eat raw or undercooked eggs (poached or boyd side up), poultry, meat, or unpasteurized milk and juices.  Food and drinks  The main goal while treating vomiting or diarrhea is to prevent dehydration. This is done by taking small amounts of liquids often.  · Keep in mind that liquids are more important than food right now.  · Drink only small amounts of liquids at a time.  · Dont force yourself to eat, especially if you are having cramping, vomiting, or diarrhea. Dont eat large amounts at a time, even if you are hungry.  · If you eat, avoid fatty, greasy, spicy, or fried foods.  · Dont eat dairy foods or drink milk if you have diarrhea. These can make diarrhea worse.  During the first 24 hours you can  try:  · Oral rehydration solutions. Do not use sports drinks. They have too much sugar and not enough electrolytes.  · Soft drinks without caffeine  · Ginger ale  · Water (plain or flavored)  · Decaf tea or coffee  · Clear broth, consommé, or bouillon  · Gelatin, popsicles, or frozen fruit juice bars  The second 24 hours, if you are feeling better, you can add:  · Hot cereal, plain toast, bread, rolls, or crackers  · Plain noodles, rice, mashed potatoes, chicken noodle soup, or rice soup  · Unsweetened canned fruit (no pineapple)  · Bananas  As you recover:  · Limit fat intake to less than 15 grams per day. Dont eat margarine, butter, oils, mayonnaise, sauces, gravies, fried foods, peanut butter, meat, poultry, or fish.  · Limit fiber. Dont eat raw or cooked vegetables, fresh fruits except bananas, or bran cereals.  · Limit caffeine and chocolate.  · Limit dairy.  · Dont use spices or seasonings except salt.  · Go back to your normal diet over time, as you feel better and your symptoms improve.  · If the symptoms come back, go back to a simple diet or clear liquids.  Follow-up care  Follow up with your healthcare provider, or as advised. If a stool sample was taken or cultures were done, call the healthcare provider for the results as instructed.  Call 911  Call 911 if you have any of these symptoms:  · Trouble breathing  · Confusion  · Extreme drowsiness or trouble walking  · Loss of consciousness  · Rapid heart rate  · Chest pain  · Stiff neck  · Seizure  When to seek medical advice  Call your healthcare provider right away if any of these occur:  · Abdominal pain that gets worse  · Constant lower right abdominal pain  · Continued vomiting and inability to keep liquids down  · Diarrhea more than 5 times a day  · Blood in vomit or stool  · Dark urine or no urine for 8 hours, dry mouth and tongue, tiredness, weakness, or dizziness  · Drowsiness  · New rash  · You dont get better in 2 to 3 days  · Fever of  100.4°F (38°C) or higher that doesnt get lower with medicine  Date Last Reviewed: 1/3/2016  © 3945-4240 [employer]. [address] [place] [address], [place] [address]. All rights reserved. This information is not intended as a substitute for professional medical care. Always follow your healthcare professional's instructions.      Follow up with your doctor in a few days as needed.  Return to the urgent care or go to the ER if symptoms get worse.    Osmany Simon MD

## 2019-09-10 DIAGNOSIS — N40.1 BENIGN PROSTATIC HYPERPLASIA WITH URINARY OBSTRUCTION: ICD-10-CM

## 2019-09-10 DIAGNOSIS — E78.5 HYPERLIPIDEMIA: ICD-10-CM

## 2019-09-10 DIAGNOSIS — N13.8 BENIGN PROSTATIC HYPERPLASIA WITH URINARY OBSTRUCTION: ICD-10-CM

## 2019-09-10 RX ORDER — TAMSULOSIN HYDROCHLORIDE 0.4 MG/1
CAPSULE ORAL
Qty: 90 CAPSULE | Refills: 3 | Status: SHIPPED | OUTPATIENT
Start: 2019-09-10 | End: 2020-10-07 | Stop reason: SDUPTHER

## 2019-09-10 RX ORDER — SIMVASTATIN 40 MG/1
TABLET, FILM COATED ORAL
Qty: 90 TABLET | Refills: 3 | Status: SHIPPED | OUTPATIENT
Start: 2019-09-10 | End: 2020-09-28 | Stop reason: SDUPTHER

## 2019-09-20 ENCOUNTER — LAB VISIT (OUTPATIENT)
Dept: LAB | Facility: HOSPITAL | Age: 84
End: 2019-09-20
Attending: INTERNAL MEDICINE
Payer: MEDICARE

## 2019-09-20 DIAGNOSIS — E55.9 MILD VITAMIN D DEFICIENCY: ICD-10-CM

## 2019-09-20 DIAGNOSIS — E78.5 HYPERLIPIDEMIA, UNSPECIFIED HYPERLIPIDEMIA TYPE: ICD-10-CM

## 2019-09-20 DIAGNOSIS — R73.03 PRE-DIABETES: ICD-10-CM

## 2019-09-20 LAB
25(OH)D3+25(OH)D2 SERPL-MCNC: 34 NG/ML (ref 30–96)
ALBUMIN SERPL BCP-MCNC: 4 G/DL (ref 3.5–5.2)
ALP SERPL-CCNC: 70 U/L (ref 55–135)
ALT SERPL W/O P-5'-P-CCNC: 18 U/L (ref 10–44)
ANION GAP SERPL CALC-SCNC: 8 MMOL/L (ref 8–16)
AST SERPL-CCNC: 17 U/L (ref 10–40)
BILIRUB SERPL-MCNC: 0.5 MG/DL (ref 0.1–1)
BUN SERPL-MCNC: 19 MG/DL (ref 8–23)
CALCIUM SERPL-MCNC: 9.9 MG/DL (ref 8.7–10.5)
CHLORIDE SERPL-SCNC: 105 MMOL/L (ref 95–110)
CO2 SERPL-SCNC: 26 MMOL/L (ref 23–29)
CREAT SERPL-MCNC: 1.3 MG/DL (ref 0.5–1.4)
EST. GFR  (AFRICAN AMERICAN): 59 ML/MIN/1.73 M^2
EST. GFR  (NON AFRICAN AMERICAN): 51 ML/MIN/1.73 M^2
ESTIMATED AVG GLUCOSE: 117 MG/DL (ref 68–131)
GLUCOSE SERPL-MCNC: 123 MG/DL (ref 70–110)
HBA1C MFR BLD HPLC: 5.7 % (ref 4–5.6)
POTASSIUM SERPL-SCNC: 4.5 MMOL/L (ref 3.5–5.1)
PROT SERPL-MCNC: 7.5 G/DL (ref 6–8.4)
SODIUM SERPL-SCNC: 139 MMOL/L (ref 136–145)

## 2019-09-20 PROCEDURE — 83036 HEMOGLOBIN GLYCOSYLATED A1C: CPT

## 2019-09-20 PROCEDURE — 36415 COLL VENOUS BLD VENIPUNCTURE: CPT

## 2019-09-20 PROCEDURE — 80053 COMPREHEN METABOLIC PANEL: CPT

## 2019-09-20 PROCEDURE — 82306 VITAMIN D 25 HYDROXY: CPT

## 2019-09-27 ENCOUNTER — OFFICE VISIT (OUTPATIENT)
Dept: INTERNAL MEDICINE | Facility: CLINIC | Age: 84
End: 2019-09-27
Payer: MEDICARE

## 2019-09-27 ENCOUNTER — IMMUNIZATION (OUTPATIENT)
Dept: PHARMACY | Facility: CLINIC | Age: 84
End: 2019-09-27
Payer: MEDICARE

## 2019-09-27 VITALS
BODY MASS INDEX: 37.04 KG/M2 | DIASTOLIC BLOOD PRESSURE: 66 MMHG | SYSTOLIC BLOOD PRESSURE: 120 MMHG | WEIGHT: 236 LBS | HEIGHT: 67 IN

## 2019-09-27 DIAGNOSIS — R73.03 PRE-DIABETES: ICD-10-CM

## 2019-09-27 DIAGNOSIS — R53.83 FATIGUE, UNSPECIFIED TYPE: ICD-10-CM

## 2019-09-27 DIAGNOSIS — N18.30 CKD (CHRONIC KIDNEY DISEASE) STAGE 3, GFR 30-59 ML/MIN: Primary | ICD-10-CM

## 2019-09-27 DIAGNOSIS — E78.2 MIXED HYPERLIPIDEMIA: ICD-10-CM

## 2019-09-27 PROCEDURE — 99999 PR PBB SHADOW E&M-EST. PATIENT-LVL III: ICD-10-PCS | Mod: PBBFAC,,, | Performed by: INTERNAL MEDICINE

## 2019-09-27 PROCEDURE — 99214 OFFICE O/P EST MOD 30 MIN: CPT | Mod: S$PBB,,, | Performed by: INTERNAL MEDICINE

## 2019-09-27 PROCEDURE — 99214 PR OFFICE/OUTPT VISIT, EST, LEVL IV, 30-39 MIN: ICD-10-PCS | Mod: S$PBB,,, | Performed by: INTERNAL MEDICINE

## 2019-09-27 PROCEDURE — 99999 PR PBB SHADOW E&M-EST. PATIENT-LVL III: CPT | Mod: PBBFAC,,, | Performed by: INTERNAL MEDICINE

## 2019-09-27 PROCEDURE — 99213 OFFICE O/P EST LOW 20 MIN: CPT | Mod: PBBFAC | Performed by: INTERNAL MEDICINE

## 2019-09-27 NOTE — PROGRESS NOTES
"Subjective:       Patient ID: Grey Vance Jr. is a 84 y.o. male.    Chief Complaint: Follow-up (6 month follow up.)    HPI   85 yo M here for follow up of    1. HPL              Simvastatin 20 mg hs              10 yr ASCVD Risk = 24% at diagnosis              June 2018 - LDL 77     2. BPH              Flomax 0.4 mg/day      3. ED       4. Pre-DM              sept 2019: 5.7%     5. Obesity (BMI-34)     6. Hx of Vit D Deficiency              Cholecalciferol 10,000 units/ week               sept 2019: 34     7. CKD Stage 2-3              GFR 51 sept 2019     9. Partial Tear Right Rotator Cuff           Followed by Dr. Haley         10. Left Ulnar Neuropathy - Carpal Tunnel           Evaluated by Dr. Barbi Morfin 8/26/14           Rec: PT and NSAIDS     11. Mild DJD Left Hip       Since last visit his wife Negrita passed away. He is dealing with grief. Has a good support system including his son Raymundo.       Review of Systems   Constitutional: Negative for fever.   HENT: Negative.    Eyes: Negative.    Respiratory: Negative for shortness of breath.    Cardiovascular: Negative for chest pain and leg swelling.   Gastrointestinal: Negative for abdominal pain, diarrhea, nausea and vomiting.   Genitourinary: Negative.    Musculoskeletal: Negative for arthralgias.   Skin: Negative for rash.   Psychiatric/Behavioral: Negative.        Objective:   /66 (BP Location: Left arm, Patient Position: Sitting, BP Method: Large (Manual))   Ht 5' 7" (1.702 m)   Wt 107 kg (236 lb)   BMI 36.96 kg/m²      Physical Exam   Constitutional: He is oriented to person, place, and time. He appears well-developed and well-nourished.   HENT:   Head: Normocephalic and atraumatic.   Eyes: Pupils are equal, round, and reactive to light. Conjunctivae and EOM are normal.   Neck: Neck supple. No thyromegaly present.   Cardiovascular: Normal rate, regular rhythm and normal heart sounds.   No murmur heard.  Pulmonary/Chest: Effort normal and " breath sounds normal. No respiratory distress. He has no wheezes.   Abdominal: Soft. Bowel sounds are normal. He exhibits no distension. There is no tenderness.   Musculoskeletal: Normal range of motion.   Neurological: He is alert and oriented to person, place, and time.   Skin: Skin is warm and dry. No rash noted.   Psychiatric: He has a normal mood and affect. Judgment and thought content normal.   Vitals reviewed.      Assessment:       1. CKD (chronic kidney disease) stage 3, GFR 30-59 ml/min    2. Mixed hyperlipidemia    3. Fatigue, unspecified type    4. Pre-diabetes        Plan:       Grey was seen today for follow-up.    Diagnoses and all orders for this visit:    CKD (chronic kidney disease) stage 3, GFR 30-59 ml/min  -     Stable  Labs in 6 mo:   CBC auto differential; Future  -     Comprehensive metabolic panel; Future    Mixed hyperlipidemia  -     Labs in 6 mo:   Lipid panel; Future   Continue statin    Fatigue, unspecified type  -     6 mo: TSH; Future    Pre-diabetes  -     6 mo: Hemoglobin A1c; Future    BMI 36  Monitor, weight loss encouraged

## 2020-03-19 ENCOUNTER — LAB VISIT (OUTPATIENT)
Dept: LAB | Facility: HOSPITAL | Age: 85
End: 2020-03-19
Attending: INTERNAL MEDICINE
Payer: MEDICARE

## 2020-03-19 DIAGNOSIS — E78.2 MIXED HYPERLIPIDEMIA: ICD-10-CM

## 2020-03-19 DIAGNOSIS — R73.03 PRE-DIABETES: ICD-10-CM

## 2020-03-19 DIAGNOSIS — R53.83 FATIGUE, UNSPECIFIED TYPE: ICD-10-CM

## 2020-03-19 DIAGNOSIS — N18.30 CKD (CHRONIC KIDNEY DISEASE) STAGE 3, GFR 30-59 ML/MIN: ICD-10-CM

## 2020-03-19 LAB
ALBUMIN SERPL BCP-MCNC: 3.6 G/DL (ref 3.5–5.2)
ALP SERPL-CCNC: 66 U/L (ref 55–135)
ALT SERPL W/O P-5'-P-CCNC: 24 U/L (ref 10–44)
ANION GAP SERPL CALC-SCNC: 8 MMOL/L (ref 8–16)
AST SERPL-CCNC: 21 U/L (ref 10–40)
BASOPHILS # BLD AUTO: 0.06 K/UL (ref 0–0.2)
BASOPHILS NFR BLD: 1 % (ref 0–1.9)
BILIRUB SERPL-MCNC: 0.4 MG/DL (ref 0.1–1)
BUN SERPL-MCNC: 16 MG/DL (ref 8–23)
CALCIUM SERPL-MCNC: 8.9 MG/DL (ref 8.7–10.5)
CHLORIDE SERPL-SCNC: 106 MMOL/L (ref 95–110)
CHOLEST SERPL-MCNC: 131 MG/DL (ref 120–199)
CHOLEST/HDLC SERPL: 3.4 {RATIO} (ref 2–5)
CO2 SERPL-SCNC: 25 MMOL/L (ref 23–29)
CREAT SERPL-MCNC: 1.2 MG/DL (ref 0.5–1.4)
DIFFERENTIAL METHOD: ABNORMAL
EOSINOPHIL # BLD AUTO: 0.2 K/UL (ref 0–0.5)
EOSINOPHIL NFR BLD: 3.6 % (ref 0–8)
ERYTHROCYTE [DISTWIDTH] IN BLOOD BY AUTOMATED COUNT: 12.7 % (ref 11.5–14.5)
EST. GFR  (AFRICAN AMERICAN): >60 ML/MIN/1.73 M^2
EST. GFR  (NON AFRICAN AMERICAN): 56 ML/MIN/1.73 M^2
ESTIMATED AVG GLUCOSE: 120 MG/DL (ref 68–131)
GLUCOSE SERPL-MCNC: 110 MG/DL (ref 70–110)
HBA1C MFR BLD HPLC: 5.8 % (ref 4–5.6)
HCT VFR BLD AUTO: 41.6 % (ref 40–54)
HDLC SERPL-MCNC: 38 MG/DL (ref 40–75)
HDLC SERPL: 29 % (ref 20–50)
HGB BLD-MCNC: 13.6 G/DL (ref 14–18)
IMM GRANULOCYTES # BLD AUTO: 0.03 K/UL (ref 0–0.04)
IMM GRANULOCYTES NFR BLD AUTO: 0.5 % (ref 0–0.5)
LDLC SERPL CALC-MCNC: 80.2 MG/DL (ref 63–159)
LYMPHOCYTES # BLD AUTO: 1.7 K/UL (ref 1–4.8)
LYMPHOCYTES NFR BLD: 27.1 % (ref 18–48)
MCH RBC QN AUTO: 31.1 PG (ref 27–31)
MCHC RBC AUTO-ENTMCNC: 32.7 G/DL (ref 32–36)
MCV RBC AUTO: 95 FL (ref 82–98)
MONOCYTES # BLD AUTO: 0.4 K/UL (ref 0.3–1)
MONOCYTES NFR BLD: 6.6 % (ref 4–15)
NEUTROPHILS # BLD AUTO: 3.8 K/UL (ref 1.8–7.7)
NEUTROPHILS NFR BLD: 61.2 % (ref 38–73)
NONHDLC SERPL-MCNC: 93 MG/DL
NRBC BLD-RTO: 0 /100 WBC
PLATELET # BLD AUTO: 314 K/UL (ref 150–350)
PMV BLD AUTO: 10 FL (ref 9.2–12.9)
POTASSIUM SERPL-SCNC: 4.3 MMOL/L (ref 3.5–5.1)
PROT SERPL-MCNC: 7.1 G/DL (ref 6–8.4)
RBC # BLD AUTO: 4.37 M/UL (ref 4.6–6.2)
SODIUM SERPL-SCNC: 139 MMOL/L (ref 136–145)
TRIGL SERPL-MCNC: 64 MG/DL (ref 30–150)
TSH SERPL DL<=0.005 MIU/L-ACNC: 1.72 UIU/ML (ref 0.4–4)
WBC # BLD AUTO: 6.17 K/UL (ref 3.9–12.7)

## 2020-03-19 PROCEDURE — 80061 LIPID PANEL: CPT

## 2020-03-19 PROCEDURE — 85025 COMPLETE CBC W/AUTO DIFF WBC: CPT

## 2020-03-19 PROCEDURE — 80053 COMPREHEN METABOLIC PANEL: CPT

## 2020-03-19 PROCEDURE — 84443 ASSAY THYROID STIM HORMONE: CPT

## 2020-03-19 PROCEDURE — 36415 COLL VENOUS BLD VENIPUNCTURE: CPT

## 2020-03-19 PROCEDURE — 83036 HEMOGLOBIN GLYCOSYLATED A1C: CPT

## 2020-03-21 ENCOUNTER — PATIENT MESSAGE (OUTPATIENT)
Dept: INTERNAL MEDICINE | Facility: CLINIC | Age: 85
End: 2020-03-21

## 2020-05-20 ENCOUNTER — LAB VISIT (OUTPATIENT)
Dept: PRIMARY CARE CLINIC | Facility: CLINIC | Age: 85
End: 2020-05-20
Payer: MEDICARE

## 2020-05-20 DIAGNOSIS — R05.9 COUGH: Primary | ICD-10-CM

## 2020-05-20 PROCEDURE — U0003 INFECTIOUS AGENT DETECTION BY NUCLEIC ACID (DNA OR RNA); SEVERE ACUTE RESPIRATORY SYNDROME CORONAVIRUS 2 (SARS-COV-2) (CORONAVIRUS DISEASE [COVID-19]), AMPLIFIED PROBE TECHNIQUE, MAKING USE OF HIGH THROUGHPUT TECHNOLOGIES AS DESCRIBED BY CMS-2020-01-R: HCPCS

## 2020-05-21 LAB — SARS-COV-2 RNA RESP QL NAA+PROBE: NOT DETECTED

## 2020-06-12 NOTE — TELEPHONE ENCOUNTER
----- Message from Kavita Vaca MA sent at 3/14/2017 12:59 PM CDT -----  Contact: LAB APPT   An appointment for an annual physical has been scheduled for 6/13/17    The patient is requesting prior labs.    A lab appointment is scheduled for 6/8/17 Please link labs to the scheduled appointment.    If the lab appointment is not appropriate, please cancel appointment and notify the patient.    Thank you!   Patient called to reschedule carotid and OV with [facility] due to pandemic. He rescheduled OV for 10/12/20 and wants to schedule carotid on 10/5/20 at 3:00pm. Unable to schedule due to expiration of order. Patient needs a new order

## 2020-06-25 ENCOUNTER — TELEPHONE (OUTPATIENT)
Dept: INTERNAL MEDICINE | Facility: CLINIC | Age: 85
End: 2020-06-25

## 2020-06-25 DIAGNOSIS — R73.03 PRE-DIABETES: ICD-10-CM

## 2020-06-25 DIAGNOSIS — N18.30 CKD (CHRONIC KIDNEY DISEASE) STAGE 3, GFR 30-59 ML/MIN: ICD-10-CM

## 2020-06-25 DIAGNOSIS — E78.2 MIXED HYPERLIPIDEMIA: Primary | ICD-10-CM

## 2020-06-25 DIAGNOSIS — E55.9 MILD VITAMIN D DEFICIENCY: ICD-10-CM

## 2020-06-25 DIAGNOSIS — D64.9 ANEMIA, UNSPECIFIED TYPE: ICD-10-CM

## 2020-06-25 NOTE — TELEPHONE ENCOUNTER
----- Message from Santy Lopez sent at 6/25/2020  9:22 AM CDT -----  Contact: Patient  Doctor appointment and lab have been scheduled.  Please link lab orders to the lab appointment.  Date of doctor appointment:    Follow up EP:  12/28/20  Date of lab appointment:    Comments: requesting lab work prior to appt  Thank you

## 2020-06-26 ENCOUNTER — PATIENT MESSAGE (OUTPATIENT)
Dept: INTERNAL MEDICINE | Facility: CLINIC | Age: 85
End: 2020-06-26

## 2020-09-24 ENCOUNTER — OFFICE VISIT (OUTPATIENT)
Dept: INTERNAL MEDICINE | Facility: CLINIC | Age: 85
End: 2020-09-24
Payer: MEDICARE

## 2020-09-24 ENCOUNTER — HOSPITAL ENCOUNTER (OUTPATIENT)
Dept: RADIOLOGY | Facility: HOSPITAL | Age: 85
Discharge: HOME | End: 2020-09-24
Attending: FAMILY MEDICINE
Payer: MEDICARE

## 2020-09-24 ENCOUNTER — PATIENT OUTREACH (OUTPATIENT)
Dept: ADMINISTRATIVE | Facility: OTHER | Age: 85
End: 2020-09-24

## 2020-09-24 VITALS
SYSTOLIC BLOOD PRESSURE: 130 MMHG | WEIGHT: 241.63 LBS | HEART RATE: 69 BPM | TEMPERATURE: 98 F | OXYGEN SATURATION: 97 % | DIASTOLIC BLOOD PRESSURE: 68 MMHG | HEIGHT: 67 IN | BODY MASS INDEX: 37.92 KG/M2

## 2020-09-24 DIAGNOSIS — M25.512 ACUTE PAIN OF LEFT SHOULDER: Primary | ICD-10-CM

## 2020-09-24 DIAGNOSIS — M25.512 ACUTE PAIN OF LEFT SHOULDER: ICD-10-CM

## 2020-09-24 PROCEDURE — 73221 MRI SHOULDER WITHOUT CONTRAST LEFT: ICD-10-PCS | Mod: 26,LT,, | Performed by: RADIOLOGY

## 2020-09-24 PROCEDURE — 73221 MRI JOINT UPR EXTREM W/O DYE: CPT | Mod: 26,LT,, | Performed by: RADIOLOGY

## 2020-09-24 PROCEDURE — 99999 PR PBB SHADOW E&M-EST. PATIENT-LVL IV: ICD-10-PCS | Mod: PBBFAC,,, | Performed by: FAMILY MEDICINE

## 2020-09-24 PROCEDURE — 99213 OFFICE O/P EST LOW 20 MIN: CPT | Mod: S$PBB,,, | Performed by: FAMILY MEDICINE

## 2020-09-24 PROCEDURE — 99999 PR PBB SHADOW E&M-EST. PATIENT-LVL IV: CPT | Mod: PBBFAC,,, | Performed by: FAMILY MEDICINE

## 2020-09-24 PROCEDURE — 99214 OFFICE O/P EST MOD 30 MIN: CPT | Mod: PBBFAC,25 | Performed by: FAMILY MEDICINE

## 2020-09-24 PROCEDURE — 73221 MRI JOINT UPR EXTREM W/O DYE: CPT | Mod: TC,LT

## 2020-09-24 PROCEDURE — 99213 PR OFFICE/OUTPT VISIT, EST, LEVL III, 20-29 MIN: ICD-10-PCS | Mod: S$PBB,,, | Performed by: FAMILY MEDICINE

## 2020-09-24 RX ORDER — BRIMONIDINE TARTRATE 1.5 MG/ML
SOLUTION/ DROPS OPHTHALMIC
COMMUNITY
Start: 2020-09-02 | End: 2023-11-06

## 2020-09-24 RX ORDER — BIMATOPROST 0.1 MG/ML
SOLUTION/ DROPS OPHTHALMIC
COMMUNITY
Start: 2020-09-08

## 2020-09-24 NOTE — PROGRESS NOTES
Subjective:      Patient ID: Grey Vance Jr. is a 85 y.o. male.    Chief Complaint: Shoulder Pain (left)      HPI:  Grey Vance Jr. Is an 85 year old male with BPH, CKD 3, ED, HLD, impingement syndrome of right shoulder, obesity, prediabetes, and vitamin D deficiency who presents to clinic today with a chief complaint of left shoulder pain.    Patient new to me; PCP is Dr. Maryjane Adkins.    Complains of left shoulder pain which started yesterday.  Stabbing in quality.  Gradually worsening.  Only noticed with movement of the left arm (abduction).  Denies associated numbness, tingling, weakness.  Denies any known inciting event or trauma.  States he had similar symptoms years ago to the right shoulder and was told he had a rotator cuff injury at that time.  Did not take any Rx so far for his symptoms.  Denies associated swelling or redness.        Past Medical History:   Diagnosis Date    BPH with elevated PSA     Cataract     CKD (chronic kidney disease) stage 3, GFR 30-59 ml/min     Fever blister     H/O impaired glucose tolerance 4/2/2014    Hyperlipidemia     Pre-diabetes     Primary osteoarthritis of left hip 11/3/2015       Past Surgical History:   Procedure Laterality Date    THROAT SURGERY  20years    TONSILLECTOMY      VASECTOMY         Family History   Problem Relation Age of Onset    Melanoma Neg Hx        Social History     Socioeconomic History    Marital status:      Spouse name: Not on file    Number of children: Not on file    Years of education: Not on file    Highest education level: Not on file   Occupational History    Occupation:      Employer: RETIRED   Social Needs    Financial resource strain: Not on file    Food insecurity     Worry: Not on file     Inability: Not on file    Transportation needs     Medical: Not on file     Non-medical: Not on file   Tobacco Use    Smoking status: Former Smoker     Types: Cigarettes     Quit date: 3/8/1969      Years since quittin.5    Smokeless tobacco: Never Used   Substance and Sexual Activity    Alcohol use: No     Alcohol/week: 0.0 standard drinks     Comment: none since age 52    Drug use: No    Sexual activity: Yes     Partners: Female   Lifestyle    Physical activity     Days per week: Not on file     Minutes per session: Not on file    Stress: Not on file   Relationships    Social connections     Talks on phone: Not on file     Gets together: Not on file     Attends Muslim service: Not on file     Active member of club or organization: Not on file     Attends meetings of clubs or organizations: Not on file     Relationship status: Not on file   Other Topics Concern    Not on file   Social History Narrative    Not on file       Review of Systems   Constitutional: Negative for chills, fatigue and fever.   HENT: Negative for congestion, hearing loss, nosebleeds, rhinorrhea, sore throat and trouble swallowing.    Eyes: Negative for pain and visual disturbance.   Respiratory: Negative for cough, shortness of breath and wheezing.    Cardiovascular: Negative for chest pain and palpitations.   Gastrointestinal: Negative for abdominal distention, abdominal pain, constipation, diarrhea, nausea and vomiting.   Genitourinary: Negative for difficulty urinating, dysuria, frequency, hematuria and urgency.   Musculoskeletal: Positive for arthralgias. Negative for back pain and myalgias.   Skin: Negative for color change and rash.   Neurological: Positive for weakness. Negative for dizziness, syncope, speech difficulty, numbness and headaches.   Psychiatric/Behavioral: Negative for agitation, behavioral problems and confusion. The patient is not nervous/anxious.      Objective:     Vitals:    20 1128   BP: 130/68   BP Location: Right arm   Patient Position: Sitting   BP Method: Large (Manual)   Pulse: 69   Temp: 98.4 °F (36.9 °C)   TempSrc: Oral   SpO2: 97%   Weight: 109.6 kg (241 lb 10 oz)   Height: 5'  "7" (1.702 m)       Physical Exam  Vitals signs and nursing note reviewed.   Constitutional:       General: He is not in acute distress.     Appearance: He is well-developed.   HENT:      Head: Normocephalic and atraumatic.      Right Ear: Hearing and external ear normal.      Left Ear: Hearing and external ear normal.      Nose: Nose normal. No rhinorrhea.   Eyes:      General: Lids are normal.         Right eye: No discharge.         Left eye: No discharge.      Conjunctiva/sclera: Conjunctivae normal.   Neck:      Musculoskeletal: Neck supple.      Trachea: Trachea normal. No tracheal deviation.   Cardiovascular:      Rate and Rhythm: Normal rate and regular rhythm.      Heart sounds: Normal heart sounds. No murmur. No friction rub. No gallop.    Pulmonary:      Effort: Pulmonary effort is normal. No respiratory distress.      Breath sounds: Normal breath sounds. No wheezing or rales.   Abdominal:      General: Bowel sounds are normal. There is no distension.      Palpations: Abdomen is soft.      Tenderness: There is no abdominal tenderness. There is no guarding or rebound.   Musculoskeletal:         General: No deformity.      Left shoulder: He exhibits decreased range of motion and decreased strength. He exhibits no tenderness, no bony tenderness, no swelling, no effusion and no crepitus.      Comments: Unable to abduct left upper extremity past approximately 25 degrees 2/2 pain.   Skin:     General: Skin is warm and dry.      Findings: No rash.   Neurological:      Mental Status: He is alert.      Motor: No abnormal muscle tone.   Psychiatric:         Speech: Speech normal.         Behavior: Behavior normal. Behavior is cooperative.         Thought Content: Thought content normal.         Judgment: Judgment normal.        Assessment:      1. Acute pain of left shoulder      Plan:   Grey was seen today for shoulder pain.    Diagnoses and all orders for this visit:    Acute pain of left shoulder  -     MRI " Shoulder Without Contrast Left; Future  -     Ambulatory referral/consult to Orthopedics; Future        -     Recommended OTC Tylenol PRN, ice packs/cold compresses, avoidance of exacerbating activities.  Check MRI for suspected rotator cuff pathology.  F/u with ortho after MRI.

## 2020-09-28 ENCOUNTER — PATIENT MESSAGE (OUTPATIENT)
Dept: INTERNAL MEDICINE | Facility: CLINIC | Age: 85
End: 2020-09-28

## 2020-09-28 DIAGNOSIS — E78.5 HYPERLIPIDEMIA: ICD-10-CM

## 2020-09-28 RX ORDER — SIMVASTATIN 40 MG/1
20 TABLET, FILM COATED ORAL NIGHTLY
Qty: 45 TABLET | Refills: 0 | Status: SHIPPED | OUTPATIENT
Start: 2020-09-28 | End: 2020-12-17

## 2020-09-28 NOTE — TELEPHONE ENCOUNTER
Care Due:                  Date            Visit Type   Department     Provider  --------------------------------------------------------------------------------                                ESTABLISHED   [facility] INTERNAL  Last Visit: 09-      PATIENT      MEDICINE       SAUNDRA LOZADA                                           [facility]  Next Visit: 12-      NEW PATIENT  MEDICINE       SAUNDRA LOZADA                                                            Last  Test          Frequency    Reason                     Performed    Due Date  --------------------------------------------------------------------------------    Office Visit  12 months..  simvastatin, tamsulosin..  09- 09-    Powered by [employer]. Reference number: [id number]. 9/28/2020 1:09:03 PM CDT

## 2020-09-28 NOTE — TELEPHONE ENCOUNTER
----- Message from Viviana Everett sent at 9/28/2020 11:56 AM CDT -----  Contact: NATALIE JEAN JR. [[id number]] @ [phone]  Requesting an RX refill or new RX.  Is this a refill or new RX: Refill   RX name and strength: simvastatin (ZOCOR) 40 MG tablet  Directions (copy/paste from chart):  TAKE 1/2 TABLET BY MOUTH EVERY EVENING   Is this a 30 day or 90 day RX:  90  Local pharmacy or mail order pharmacy: local   Pharmacy name and phone # (copy/paste from chart):  [facility] [phone] (Phone)  [phone] (Fax)      Comments:Patient ware doctor is out but his Pharmacy has been waiting on a call back for approval over a week now.

## 2020-09-30 ENCOUNTER — TELEPHONE (OUTPATIENT)
Dept: ORTHOPEDICS | Facility: CLINIC | Age: 85
End: 2020-09-30

## 2020-10-01 ENCOUNTER — OFFICE VISIT (OUTPATIENT)
Dept: ORTHOPEDICS | Facility: CLINIC | Age: 85
End: 2020-10-01
Payer: MEDICARE

## 2020-10-01 VITALS — BODY MASS INDEX: 38.14 KG/M2 | HEIGHT: 67 IN | WEIGHT: 243 LBS

## 2020-10-01 DIAGNOSIS — M25.512 ACUTE PAIN OF LEFT SHOULDER: Primary | ICD-10-CM

## 2020-10-01 PROCEDURE — 99213 OFFICE O/P EST LOW 20 MIN: CPT | Mod: S$PBB,,, | Performed by: PHYSICIAN ASSISTANT

## 2020-10-01 PROCEDURE — 99999 PR PBB SHADOW E&M-EST. PATIENT-LVL III: ICD-10-PCS | Mod: PBBFAC,,, | Performed by: PHYSICIAN ASSISTANT

## 2020-10-01 PROCEDURE — 99213 OFFICE O/P EST LOW 20 MIN: CPT | Mod: PBBFAC | Performed by: PHYSICIAN ASSISTANT

## 2020-10-01 PROCEDURE — 99213 PR OFFICE/OUTPT VISIT, EST, LEVL III, 20-29 MIN: ICD-10-PCS | Mod: S$PBB,,, | Performed by: PHYSICIAN ASSISTANT

## 2020-10-01 PROCEDURE — 99999 PR PBB SHADOW E&M-EST. PATIENT-LVL III: CPT | Mod: PBBFAC,,, | Performed by: PHYSICIAN ASSISTANT

## 2020-10-01 RX ORDER — MELOXICAM 15 MG/1
15 TABLET ORAL DAILY
Qty: 30 TABLET | Refills: 0 | Status: SHIPPED | OUTPATIENT
Start: 2020-10-01 | End: 2020-10-14 | Stop reason: SDUPTHER

## 2020-10-01 NOTE — PROGRESS NOTES
SUBJECTIVE:     Chief Complaint & History of Present Illness:  Grey Vance Jr. is a  New  patient 85 y.o. male who is seen here today with a complaint of    Chief Complaint   Patient presents with    Left Shoulder - Pain    .  Here today for follow-up care for his acute left shoulder pain initially treated 09/24/2020.  Was sent for an MRI on is here today to review results and talk about treatment options.  Since our initial conversation his pain has returned to baseline he is having no pain no discomfort all in the shoulder and has had complete return of his range of motion  On a scale of 1-10, with 10 being worst pain imaginable, he rates this pain as 0 on good days and 1 on bad days.  he describes the pain as sore.    Review of patient's allergies indicates:   Allergen Reactions    Bee pollens          Current Outpatient Medications   Medication Sig Dispense Refill    aspirin (ECOTRIN) 81 MG EC tablet Take 81 mg by mouth once daily.      brimonidine 0.15 % OPTH DROP (ALPHAGAN) 0.15 % ophthalmic solution PLACE 1 GTT AUGUSTO BID      CALCIUM CITRATE/VITAMIN D2 (CALCIUM CITRATE WITH D ORAL) Take by mouth once daily.      CHOLECALCIFEROL, VITAMIN D3, (VITAMIN D3 ORAL) Take 10,000 Units by mouth once a week. Take one weekly      folic acid (FOLVITE) 1 MG tablet Take 1 mg by mouth once daily.      GLUCOSAM SUL NA/CHONDR DWYER A NA (GLUCOSAMINE-CHONDROITIN 3X ORAL) Take 2 tablets by mouth once daily.      LUMIGAN 0.01 % Drop INT 1 GTT IN OS HS      multivitamin (ONE DAILY MULTIVITAMIN) per tablet Take 1 tablet by mouth once daily.      simvastatin (ZOCOR) 40 MG tablet Take 0.5 tablets (20 mg total) by mouth every evening. 45 tablet 0    tamsulosin (FLOMAX) 0.4 mg Cap TAKE 1 CAPSULE(0.4 MG) BY MOUTH EVERY EVENING 90 capsule 3     No current facility-administered medications for this visit.        Past Medical History:   Diagnosis Date    BPH with elevated PSA     Cataract     CKD (chronic kidney disease)  "stage 3, GFR 30-59 ml/min     Fever blister     H/O impaired glucose tolerance 4/2/2014    Hyperlipidemia     Pre-diabetes     Primary osteoarthritis of left hip 11/3/2015       Past Surgical History:   Procedure Laterality Date    THROAT SURGERY  20years    TONSILLECTOMY      VASECTOMY         Vital Signs (Most Recent)  There were no vitals filed for this visit.    Review of Systems:  ROS:  Constitutional: no fever or chills  Eyes: no visual changes  ENT: no nasal congestion or sore throat  Respiratory: no cough or shortness of breath  Cardiovascular: no chest pain or palpitations  Gastrointestinal: no nausea or vomiting, tolerating diet  Genitourinary: no hematuria or dysuria, Positive CKD stage 3, BPH  Integument/Breast: no rash or pruritis  Hematologic/Lymphatic: no easy bruising or lymphadenopathy  Musculoskeletal: no arthralgias or myalgias, Positive for impingement syndrome the shoulder  Neurological: no seizures or tremors  Behavioral/Psych: no auditory or visual hallucinations  Endocrine: no heat or cold intolerance, Positive vitamin-D deficiency      OBJECTIVE:     PHYSICAL EXAM:  Height: 5' 7" (170.2 cm) Weight: 110.2 kg (243 lb), General Appearance: Well nourished, well developed, in no acute distress.  Neurological: Mood & affect are normal.  Shoulder exam: left  Tenderness: none  ROM: forward flexion 180/180, extension 45/45, full abduction 180/180, abduction-glenohumeral 90/90, external rotation 50/50  Shoulder Strength: biceps 5/5, triceps 5/5, abduction 5/5, adduction 5/5, external rotation 5/5 with shoulder at side, flexion 5/5, and extension 5/5  negative for tenderness about the glenohumeral joint, negative for tenderness over the acromioclavicular joint and negative for impingement sign  Stability tests: anterior apprehension test negative and posterior apprehension test negative  Special Tests:Cross-chest abduction: negative                     RADIOGRAPHS:  Review of MRI demonstrates a " bursitis of the all 3 muscle of the rotator cuff without evidence of rupture or tear labrum appears intact    ASSESSMENT/PLAN:       ICD-10-CM ICD-9-CM   1. Acute pain of left shoulder  M25.512 719.41       Plan: We discussed with the patient at length all the different treatment options available for his leftshoulder including anti-inflammatories, acetaminophen, rest, ice, Physical therapy to include strengthening exercise, occasional cortisone injections for temporary relief, arthroscopic surgical repair, and finally shoulder arthroplasty.   Patient symptoms have returned to baseline no treatment at this  Prescription for meloxicam 15 mg provided patient was instructed if he has recurrence of symptoms to begin the medication and take for 5-7 days if symptoms do not resolve contact the clinic for further care and treatment

## 2020-10-02 ENCOUNTER — IMMUNIZATION (OUTPATIENT)
Dept: PHARMACY | Facility: CLINIC | Age: 85
End: 2020-10-02
Payer: MEDICARE

## 2020-10-07 DIAGNOSIS — N13.8 BENIGN PROSTATIC HYPERPLASIA WITH URINARY OBSTRUCTION: ICD-10-CM

## 2020-10-07 DIAGNOSIS — N40.1 BENIGN PROSTATIC HYPERPLASIA WITH URINARY OBSTRUCTION: ICD-10-CM

## 2020-10-07 NOTE — TELEPHONE ENCOUNTER
No new care gaps identified.  Powered by [employer]. Reference number: [id number]. 10/07/2020 11:09:16 AM   MEGT

## 2020-10-07 NOTE — TELEPHONE ENCOUNTER
----- Message from Sabina Steward sent at 10/7/2020 10:43 AM CDT -----  Contact: self [phone]  Requesting an RX refill or new RX.  Is this a refill or new RX:  refill  RX name and strength: tamsulosin (FLOMAX) 0.4 mg Cap  Directions (copy/paste from chart): Sig: TAKE 1 CAPSULE(0.4 MG) BY MOUTH EVERY EVENING   Is this a 30 day or 90 day RX:    Local pharmacy or mail order pharmacy: local   Pharmacy name and phone # (copy/paste from chart): [id number] - [place] - [address] AT [facility] [address] [phone] (Phone)  [phone] (Fax)    Comments:    Pt states he has called several times to get this medication refilled. Pt states he is running out and needs it today. Please call and advise

## 2020-10-08 RX ORDER — TAMSULOSIN HYDROCHLORIDE 0.4 MG/1
CAPSULE ORAL
Qty: 90 CAPSULE | Refills: 0 | Status: SHIPPED | OUTPATIENT
Start: 2020-10-08 | End: 2021-01-07

## 2020-10-08 NOTE — TELEPHONE ENCOUNTER
----- Message from Santy Lopez sent at 10/8/2020  9:56 AM CDT -----  Regarding: Rx refill  Contact: Patient [phone]  RX request - refill or new RX.  Is this a refill or new RX:  Refill   RX name and strength: tamsulosin (FLOMAX) 0.4 mg Cap  Directions:   Is this a 30 day or 90 day RX:      Pharmacy name and phone # [id number]  [place], [place] - [address] AT [facility] [address] [phone] (Phone) [phone] (Fax)    Comments:  Patient stating has called in several times per request, still no response back on the refill, call to update. Annual set for 10/12/20    Please call an advise  Thank you

## 2020-10-08 NOTE — PROGRESS NOTES
Refill Authorization Note   Grey Vance is requesting a refill authorization.  Brief assessment and rationale for refill: Approve     Medication Therapy Plan: [facility]    Medication reconciliation completed: No   Comments:   Orders Placed This Encounter    tamsulosin (FLOMAX) 0.4 mg Cap      Requested Prescriptions   Signed Prescriptions Disp Refills    tamsulosin (FLOMAX) 0.4 mg Cap 90 capsule 0     Sig: TAKE 1 CAPSULE(0.4 MG) BY MOUTH EVERY EVENING       Urology: Alpha-Adrenergic Blocker Failed - 10/8/2020 11:15 AM        Failed - Last BP in normal range within 360 days     BP Readings from Last 3 Encounters:   09/25/20 (!) 140/67   09/24/20 130/68   09/27/19 120/66              Passed - Patient is at least 18 years old        Passed - Office Visit within last 12 months or future 90 days.     Recent Outpatient Visits            1 week ago Acute pain of left shoulder    [facility] 5th Fl Juan Carlos Beasley PA-C    2 weeks ago Acute pain of left shoulder    [facility] John Cisneros MD    1 year ago CKD (chronic kidney disease) stage 3, GFR 30-59 ml/min    [facility] Maryjane Adkins MD    1 year ago Gastroenteritis    [facility] [place] Osmany Simon MD    1 year ago Pre-diabetes    [facility] Maryjane Adkins MD          Future Appointments              Tomorrow LAB[facility], [facility] PC[facility]    In 4 days IAN Richards y [facility]                Passed - Prostate Cancer is not on problem list            Appointments  past 12m or future 3m with PCP    Date Provider   Last Visit   9/27/2019 Maryjane Adkins MD   Next Visit   Visit date not found Maryjane Adkins MD   ED visits in past 90 days: 0     Note composed:12:43 PM 10/08/2020

## 2020-10-09 ENCOUNTER — LAB VISIT (OUTPATIENT)
Dept: LAB | Facility: HOSPITAL | Age: 85
End: 2020-10-09
Attending: INTERNAL MEDICINE
Payer: MEDICARE

## 2020-10-09 DIAGNOSIS — E78.2 MIXED HYPERLIPIDEMIA: ICD-10-CM

## 2020-10-09 DIAGNOSIS — N18.30 CKD (CHRONIC KIDNEY DISEASE) STAGE 3, GFR 30-59 ML/MIN: ICD-10-CM

## 2020-10-09 DIAGNOSIS — R73.03 PRE-DIABETES: ICD-10-CM

## 2020-10-09 DIAGNOSIS — E55.9 MILD VITAMIN D DEFICIENCY: ICD-10-CM

## 2020-10-09 DIAGNOSIS — D64.9 ANEMIA, UNSPECIFIED TYPE: ICD-10-CM

## 2020-10-09 LAB
25(OH)D3+25(OH)D2 SERPL-MCNC: 35 NG/ML (ref 30–96)
ALBUMIN SERPL BCP-MCNC: 3.9 G/DL (ref 3.5–5.2)
ALP SERPL-CCNC: 66 U/L (ref 55–135)
ALT SERPL W/O P-5'-P-CCNC: 27 U/L (ref 10–44)
ANION GAP SERPL CALC-SCNC: 9 MMOL/L (ref 8–16)
AST SERPL-CCNC: 21 U/L (ref 10–40)
BASOPHILS # BLD AUTO: 0.05 K/UL (ref 0–0.2)
BASOPHILS NFR BLD: 0.8 % (ref 0–1.9)
BILIRUB SERPL-MCNC: 0.7 MG/DL (ref 0.1–1)
BUN SERPL-MCNC: 17 MG/DL (ref 8–23)
CALCIUM SERPL-MCNC: 9.5 MG/DL (ref 8.7–10.5)
CHLORIDE SERPL-SCNC: 104 MMOL/L (ref 95–110)
CHOLEST SERPL-MCNC: 151 MG/DL (ref 120–199)
CHOLEST/HDLC SERPL: 3.2 {RATIO} (ref 2–5)
CO2 SERPL-SCNC: 26 MMOL/L (ref 23–29)
CREAT SERPL-MCNC: 1.3 MG/DL (ref 0.5–1.4)
DIFFERENTIAL METHOD: ABNORMAL
EOSINOPHIL # BLD AUTO: 0.2 K/UL (ref 0–0.5)
EOSINOPHIL NFR BLD: 3.1 % (ref 0–8)
ERYTHROCYTE [DISTWIDTH] IN BLOOD BY AUTOMATED COUNT: 13.4 % (ref 11.5–14.5)
EST. GFR  (AFRICAN AMERICAN): 58.3 ML/MIN/1.73 M^2
EST. GFR  (NON AFRICAN AMERICAN): 50.5 ML/MIN/1.73 M^2
ESTIMATED AVG GLUCOSE: 117 MG/DL (ref 68–131)
GLUCOSE SERPL-MCNC: 117 MG/DL (ref 70–110)
HBA1C MFR BLD HPLC: 5.7 % (ref 4–5.6)
HCT VFR BLD AUTO: 43.2 % (ref 40–54)
HDLC SERPL-MCNC: 47 MG/DL (ref 40–75)
HDLC SERPL: 31.1 % (ref 20–50)
HGB BLD-MCNC: 14.1 G/DL (ref 14–18)
IMM GRANULOCYTES # BLD AUTO: 0.01 K/UL (ref 0–0.04)
IMM GRANULOCYTES NFR BLD AUTO: 0.2 % (ref 0–0.5)
LDLC SERPL CALC-MCNC: 90.6 MG/DL (ref 63–159)
LYMPHOCYTES # BLD AUTO: 1.7 K/UL (ref 1–4.8)
LYMPHOCYTES NFR BLD: 26.2 % (ref 18–48)
MCH RBC QN AUTO: 31.1 PG (ref 27–31)
MCHC RBC AUTO-ENTMCNC: 32.6 G/DL (ref 32–36)
MCV RBC AUTO: 95 FL (ref 82–98)
MONOCYTES # BLD AUTO: 0.4 K/UL (ref 0.3–1)
MONOCYTES NFR BLD: 6.6 % (ref 4–15)
NEUTROPHILS # BLD AUTO: 4.1 K/UL (ref 1.8–7.7)
NEUTROPHILS NFR BLD: 63.1 % (ref 38–73)
NONHDLC SERPL-MCNC: 104 MG/DL
NRBC BLD-RTO: 0 /100 WBC
PLATELET # BLD AUTO: 292 K/UL (ref 150–350)
PMV BLD AUTO: 10.1 FL (ref 9.2–12.9)
POTASSIUM SERPL-SCNC: 4.7 MMOL/L (ref 3.5–5.1)
PROT SERPL-MCNC: 7.3 G/DL (ref 6–8.4)
RBC # BLD AUTO: 4.53 M/UL (ref 4.6–6.2)
SODIUM SERPL-SCNC: 139 MMOL/L (ref 136–145)
TRIGL SERPL-MCNC: 67 MG/DL (ref 30–150)
WBC # BLD AUTO: 6.53 K/UL (ref 3.9–12.7)

## 2020-10-09 PROCEDURE — 36415 COLL VENOUS BLD VENIPUNCTURE: CPT

## 2020-10-09 PROCEDURE — 80053 COMPREHEN METABOLIC PANEL: CPT

## 2020-10-09 PROCEDURE — 83036 HEMOGLOBIN GLYCOSYLATED A1C: CPT

## 2020-10-09 PROCEDURE — 82306 VITAMIN D 25 HYDROXY: CPT

## 2020-10-09 PROCEDURE — 85025 COMPLETE CBC W/AUTO DIFF WBC: CPT

## 2020-10-09 PROCEDURE — 80061 LIPID PANEL: CPT

## 2020-10-12 ENCOUNTER — OFFICE VISIT (OUTPATIENT)
Dept: INTERNAL MEDICINE | Facility: CLINIC | Age: 85
End: 2020-10-12
Payer: MEDICARE

## 2020-10-12 VITALS
SYSTOLIC BLOOD PRESSURE: 124 MMHG | OXYGEN SATURATION: 97 % | BODY MASS INDEX: 37.72 KG/M2 | WEIGHT: 240.31 LBS | HEIGHT: 67 IN | HEART RATE: 65 BPM | DIASTOLIC BLOOD PRESSURE: 68 MMHG

## 2020-10-12 DIAGNOSIS — N18.30 STAGE 3 CHRONIC KIDNEY DISEASE, UNSPECIFIED WHETHER STAGE 3A OR 3B CKD: ICD-10-CM

## 2020-10-12 DIAGNOSIS — R73.03 PRE-DIABETES: ICD-10-CM

## 2020-10-12 DIAGNOSIS — E78.2 MIXED HYPERLIPIDEMIA: ICD-10-CM

## 2020-10-12 DIAGNOSIS — Z00.00 ANNUAL PHYSICAL EXAM: Primary | ICD-10-CM

## 2020-10-12 PROCEDURE — 99999 PR PBB SHADOW E&M-EST. PATIENT-LVL IV: ICD-10-PCS | Mod: PBBFAC,,, | Performed by: PHYSICIAN ASSISTANT

## 2020-10-12 PROCEDURE — 99397 PR PREVENTIVE VISIT,EST,65 & OVER: ICD-10-PCS | Mod: S$PBB,,, | Performed by: PHYSICIAN ASSISTANT

## 2020-10-12 PROCEDURE — 99999 PR PBB SHADOW E&M-EST. PATIENT-LVL IV: CPT | Mod: PBBFAC,,, | Performed by: PHYSICIAN ASSISTANT

## 2020-10-12 PROCEDURE — 99214 OFFICE O/P EST MOD 30 MIN: CPT | Mod: PBBFAC | Performed by: PHYSICIAN ASSISTANT

## 2020-10-12 PROCEDURE — 99397 PER PM REEVAL EST PAT 65+ YR: CPT | Mod: S$PBB,,, | Performed by: PHYSICIAN ASSISTANT

## 2020-10-12 NOTE — PROGRESS NOTES
Subjective:       Patient ID: Grey Vance Jr. is a 85 y.o. male.    Chief Complaint: Annual Exam    HPI     Established pt of Maryjane Adkins MD (new to me)    Here for annual exam.     He is doing well today. Riding his bike daily. BP WNL. Reprts adherence with his meds. Tolerating without difficulty.   Annual lab obtained prior to visit were reviewed and all are within acceptable range     HLD: On statin therapy LDL 90    BPH: No  complaints . On flomax.     OA: Takes aleve prn. Had a fall off his bike a few weeks ago, Mild fall, had some left hand pain but it has resolved      Past Medical History:   Diagnosis Date    BPH with elevated PSA     Cataract     CKD (chronic kidney disease) stage 3, GFR 30-59 ml/min     Fever blister     H/O impaired glucose tolerance 2014    Hyperlipidemia     Pre-diabetes     Primary osteoarthritis of left hip 11/3/2015     Social History     Tobacco Use    Smoking status: Former Smoker     Types: Cigarettes     Quit date: 3/8/1969     Years since quittin.6    Smokeless tobacco: Never Used   Substance Use Topics    Alcohol use: No     Alcohol/week: 0.0 standard drinks     Comment: none since age 52    Drug use: No     Review of patient's allergies indicates:   Allergen Reactions    Bee pollens          Current Outpatient Medications:     aspirin (ECOTRIN) 81 MG EC tablet, Take 81 mg by mouth once daily., Disp: , Rfl:     brimonidine 0.15 % OPTH DROP (ALPHAGAN) 0.15 % ophthalmic solution, PLACE 1 GTT AUGUSTO BID, Disp: , Rfl:     CALCIUM CITRATE/VITAMIN D2 (CALCIUM CITRATE WITH D ORAL), Take by mouth once daily., Disp: , Rfl:     CHOLECALCIFEROL, VITAMIN D3, (VITAMIN D3 ORAL), Take 10,000 Units by mouth once a week. Take one weekly, Disp: , Rfl:     folic acid (FOLVITE) 1 MG tablet, Take 1 mg by mouth once daily., Disp: , Rfl:     GLUCOSAM SUL NA/CHONDR DWYER A NA (GLUCOSAMINE-CHONDROITIN 3X ORAL), Take 2 tablets by mouth once daily., Disp: , Rfl:      "LUMIGAN 0.01 % Drop, INT 1 GTT IN OS HS, Disp: , Rfl:     meloxicam (MOBIC) 15 MG tablet, Take 1 tablet (15 mg total) by mouth once daily., Disp: 30 tablet, Rfl: 0    multivitamin (ONE DAILY MULTIVITAMIN) per tablet, Take 1 tablet by mouth once daily., Disp: , Rfl:     simvastatin (ZOCOR) 40 MG tablet, Take 0.5 tablets (20 mg total) by mouth every evening., Disp: 45 tablet, Rfl: 0    tamsulosin (FLOMAX) 0.4 mg Cap, TAKE 1 CAPSULE(0.4 MG) BY MOUTH EVERY EVENING, Disp: 90 capsule, Rfl: 0      Review of Systems   Constitutional: Negative for chills, fever and unexpected weight change.   Respiratory: Negative for cough, shortness of breath and wheezing.    Cardiovascular: Negative for chest pain and leg swelling.   Gastrointestinal: Negative for abdominal pain, nausea and vomiting.   Endocrine: Negative for polydipsia and polyuria.   Integumentary:  Negative for rash.   Neurological: Negative for weakness, light-headedness and headaches.         Objective: /68 (BP Location: Left arm, Patient Position: Sitting, BP Method: Large (Manual))   Pulse 65   Ht 5' 7" (1.702 m)   Wt 109 kg (240 lb 4.8 oz)   SpO2 97%   BMI 37.64 kg/m²         Physical Exam  Vitals signs reviewed.   Constitutional:       General: He is not in acute distress.     Appearance: Normal appearance. He is well-developed. He is obese.   HENT:      Head: Normocephalic and atraumatic.      Right Ear: Tympanic membrane, ear canal and external ear normal.      Left Ear: Tympanic membrane, ear canal and external ear normal.      Ears:      Comments: Wearing hearing aids     Mouth/Throat:      Mouth: Mucous membranes are moist.      Pharynx: Oropharynx is clear.   Cardiovascular:      Rate and Rhythm: Normal rate and regular rhythm.      Heart sounds: No murmur. No friction rub.   Pulmonary:      Effort: Pulmonary effort is normal.      Breath sounds: Normal breath sounds. No wheezing or rales.   Abdominal:      General: Bowel sounds are normal. "      Palpations: Abdomen is soft.      Tenderness: There is no abdominal tenderness.   Musculoskeletal:      Right hand: He exhibits normal range of motion and no tenderness.      Left hand: He exhibits normal range of motion and no tenderness.      Right lower leg: No edema.      Left lower leg: No edema.   Lymphadenopathy:      Cervical: No cervical adenopathy.   Skin:     General: Skin is warm and dry.      Findings: No rash.   Neurological:      Mental Status: He is alert.   Psychiatric:         Mood and Affect: Mood normal.         Assessment:       1. Annual physical exam    2. Mixed hyperlipidemia    3. Pre-diabetes    4. Stage 3 chronic kidney disease, unspecified whether stage 3a or 3b CKD        Plan:         Grey was seen today for annual exam.    Diagnoses and all orders for this visit:    Annual physical exam    Mixed hyperlipidemia    Pre-diabetes    Stage 3 chronic kidney disease, unspecified whether stage 3a or 3b CKD      Health maintenance reviewed and updated  Lab reviewed within acceptable range  Chronic conditions stable  Continue current meds  F/u in 6 months with PCP    Gloria Grider PA-C

## 2020-10-14 RX ORDER — MELOXICAM 15 MG/1
15 TABLET ORAL DAILY
Qty: 30 TABLET | Refills: 1 | Status: SHIPPED | OUTPATIENT
Start: 2020-10-14 | End: 2020-11-13

## 2021-01-04 ENCOUNTER — PATIENT MESSAGE (OUTPATIENT)
Dept: INTERNAL MEDICINE | Facility: CLINIC | Age: 86
End: 2021-01-04

## 2021-01-07 DIAGNOSIS — N13.8 BENIGN PROSTATIC HYPERPLASIA WITH URINARY OBSTRUCTION: ICD-10-CM

## 2021-01-07 DIAGNOSIS — N40.1 BENIGN PROSTATIC HYPERPLASIA WITH URINARY OBSTRUCTION: ICD-10-CM

## 2021-01-08 RX ORDER — TAMSULOSIN HYDROCHLORIDE 0.4 MG/1
CAPSULE ORAL
Qty: 90 CAPSULE | Refills: 3 | Status: SHIPPED | OUTPATIENT
Start: 2021-01-08 | End: 2021-10-25

## 2021-01-22 ENCOUNTER — PATIENT MESSAGE (OUTPATIENT)
Dept: ADMINISTRATIVE | Facility: OTHER | Age: 86
End: 2021-01-22

## 2021-01-27 ENCOUNTER — PATIENT MESSAGE (OUTPATIENT)
Dept: ADMINISTRATIVE | Facility: CLINIC | Age: 86
End: 2021-01-27

## 2021-01-30 ENCOUNTER — PATIENT MESSAGE (OUTPATIENT)
Dept: ADMINISTRATIVE | Facility: CLINIC | Age: 86
End: 2021-01-30

## 2021-02-03 ENCOUNTER — PATIENT MESSAGE (OUTPATIENT)
Dept: ADMINISTRATIVE | Facility: CLINIC | Age: 86
End: 2021-02-03

## 2021-02-11 ENCOUNTER — IMMUNIZATION (OUTPATIENT)
Dept: PHARMACY | Facility: CLINIC | Age: 86
End: 2021-02-11
Payer: MEDICARE

## 2021-02-11 DIAGNOSIS — Z23 NEED FOR VACCINATION: Primary | ICD-10-CM

## 2021-03-11 ENCOUNTER — IMMUNIZATION (OUTPATIENT)
Dept: PHARMACY | Facility: CLINIC | Age: 86
End: 2021-03-11
Payer: MEDICARE

## 2021-03-11 DIAGNOSIS — Z23 NEED FOR VACCINATION: Primary | ICD-10-CM

## 2021-04-01 ENCOUNTER — PATIENT MESSAGE (OUTPATIENT)
Dept: INTERNAL MEDICINE | Facility: CLINIC | Age: 86
End: 2021-04-01

## 2021-04-01 DIAGNOSIS — N18.30 STAGE 3 CHRONIC KIDNEY DISEASE, UNSPECIFIED WHETHER STAGE 3A OR 3B CKD: ICD-10-CM

## 2021-04-01 DIAGNOSIS — R73.03 PRE-DIABETES: Primary | ICD-10-CM

## 2021-04-09 ENCOUNTER — LAB VISIT (OUTPATIENT)
Dept: LAB | Facility: HOSPITAL | Age: 86
End: 2021-04-09
Attending: INTERNAL MEDICINE
Payer: MEDICARE

## 2021-04-09 DIAGNOSIS — R73.03 PRE-DIABETES: ICD-10-CM

## 2021-04-09 DIAGNOSIS — N18.30 STAGE 3 CHRONIC KIDNEY DISEASE, UNSPECIFIED WHETHER STAGE 3A OR 3B CKD: ICD-10-CM

## 2021-04-09 LAB
ALBUMIN SERPL BCP-MCNC: 3.7 G/DL (ref 3.5–5.2)
ALP SERPL-CCNC: 63 U/L (ref 55–135)
ALT SERPL W/O P-5'-P-CCNC: 23 U/L (ref 10–44)
ANION GAP SERPL CALC-SCNC: 7 MMOL/L (ref 8–16)
AST SERPL-CCNC: 16 U/L (ref 10–40)
BASOPHILS # BLD AUTO: 0.04 K/UL (ref 0–0.2)
BASOPHILS NFR BLD: 0.6 % (ref 0–1.9)
BILIRUB SERPL-MCNC: 0.3 MG/DL (ref 0.1–1)
BUN SERPL-MCNC: 19 MG/DL (ref 8–23)
CALCIUM SERPL-MCNC: 9.1 MG/DL (ref 8.7–10.5)
CHLORIDE SERPL-SCNC: 106 MMOL/L (ref 95–110)
CO2 SERPL-SCNC: 25 MMOL/L (ref 23–29)
CREAT SERPL-MCNC: 1.3 MG/DL (ref 0.5–1.4)
DIFFERENTIAL METHOD: ABNORMAL
EOSINOPHIL # BLD AUTO: 0.3 K/UL (ref 0–0.5)
EOSINOPHIL NFR BLD: 5.2 % (ref 0–8)
ERYTHROCYTE [DISTWIDTH] IN BLOOD BY AUTOMATED COUNT: 13.1 % (ref 11.5–14.5)
EST. GFR  (AFRICAN AMERICAN): 58.3 ML/MIN/1.73 M^2
EST. GFR  (NON AFRICAN AMERICAN): 50.5 ML/MIN/1.73 M^2
ESTIMATED AVG GLUCOSE: 126 MG/DL (ref 68–131)
GLUCOSE SERPL-MCNC: 105 MG/DL (ref 70–110)
HBA1C MFR BLD: 6 % (ref 4–5.6)
HCT VFR BLD AUTO: 40.7 % (ref 40–54)
HGB BLD-MCNC: 13.4 G/DL (ref 14–18)
IMM GRANULOCYTES # BLD AUTO: 0.01 K/UL (ref 0–0.04)
IMM GRANULOCYTES NFR BLD AUTO: 0.2 % (ref 0–0.5)
LYMPHOCYTES # BLD AUTO: 1.9 K/UL (ref 1–4.8)
LYMPHOCYTES NFR BLD: 29.6 % (ref 18–48)
MCH RBC QN AUTO: 30.9 PG (ref 27–31)
MCHC RBC AUTO-ENTMCNC: 32.9 G/DL (ref 32–36)
MCV RBC AUTO: 94 FL (ref 82–98)
MONOCYTES # BLD AUTO: 0.6 K/UL (ref 0.3–1)
MONOCYTES NFR BLD: 8.8 % (ref 4–15)
NEUTROPHILS # BLD AUTO: 3.6 K/UL (ref 1.8–7.7)
NEUTROPHILS NFR BLD: 55.6 % (ref 38–73)
NRBC BLD-RTO: 0 /100 WBC
PLATELET # BLD AUTO: 260 K/UL (ref 150–450)
PMV BLD AUTO: 10.3 FL (ref 9.2–12.9)
POTASSIUM SERPL-SCNC: 4.6 MMOL/L (ref 3.5–5.1)
PROT SERPL-MCNC: 6.8 G/DL (ref 6–8.4)
RBC # BLD AUTO: 4.33 M/UL (ref 4.6–6.2)
SODIUM SERPL-SCNC: 138 MMOL/L (ref 136–145)
WBC # BLD AUTO: 6.38 K/UL (ref 3.9–12.7)

## 2021-04-09 PROCEDURE — 85025 COMPLETE CBC W/AUTO DIFF WBC: CPT | Performed by: INTERNAL MEDICINE

## 2021-04-09 PROCEDURE — 83036 HEMOGLOBIN GLYCOSYLATED A1C: CPT | Performed by: INTERNAL MEDICINE

## 2021-04-09 PROCEDURE — 36415 COLL VENOUS BLD VENIPUNCTURE: CPT | Performed by: INTERNAL MEDICINE

## 2021-04-09 PROCEDURE — 80053 COMPREHEN METABOLIC PANEL: CPT | Performed by: INTERNAL MEDICINE

## 2021-04-12 ENCOUNTER — OFFICE VISIT (OUTPATIENT)
Dept: INTERNAL MEDICINE | Facility: CLINIC | Age: 86
End: 2021-04-12
Payer: MEDICARE

## 2021-04-12 VITALS
DIASTOLIC BLOOD PRESSURE: 70 MMHG | HEART RATE: 66 BPM | BODY MASS INDEX: 39.39 KG/M2 | OXYGEN SATURATION: 95 % | WEIGHT: 251 LBS | SYSTOLIC BLOOD PRESSURE: 120 MMHG | HEIGHT: 67 IN

## 2021-04-12 DIAGNOSIS — N18.30 STAGE 3 CHRONIC KIDNEY DISEASE, UNSPECIFIED WHETHER STAGE 3A OR 3B CKD: Primary | ICD-10-CM

## 2021-04-12 DIAGNOSIS — E66.01 MORBID (SEVERE) OBESITY DUE TO EXCESS CALORIES: ICD-10-CM

## 2021-04-12 DIAGNOSIS — R53.81 PHYSICAL DECONDITIONING: ICD-10-CM

## 2021-04-12 DIAGNOSIS — E78.2 MIXED HYPERLIPIDEMIA: ICD-10-CM

## 2021-04-12 DIAGNOSIS — R73.03 PRE-DIABETES: ICD-10-CM

## 2021-04-12 PROCEDURE — 99999 PR PBB SHADOW E&M-EST. PATIENT-LVL V: ICD-10-PCS | Mod: PBBFAC,,, | Performed by: INTERNAL MEDICINE

## 2021-04-12 PROCEDURE — 99999 PR PBB SHADOW E&M-EST. PATIENT-LVL V: CPT | Mod: PBBFAC,,, | Performed by: INTERNAL MEDICINE

## 2021-04-12 PROCEDURE — 99215 OFFICE O/P EST HI 40 MIN: CPT | Mod: PBBFAC | Performed by: INTERNAL MEDICINE

## 2021-04-12 PROCEDURE — 99214 OFFICE O/P EST MOD 30 MIN: CPT | Mod: S$PBB,,, | Performed by: INTERNAL MEDICINE

## 2021-04-12 PROCEDURE — 99214 PR OFFICE/OUTPT VISIT, EST, LEVL IV, 30-39 MIN: ICD-10-PCS | Mod: S$PBB,,, | Performed by: INTERNAL MEDICINE

## 2021-04-12 RX ORDER — PREDNISOLONE ACETATE 10 MG/ML
SUSPENSION/ DROPS OPHTHALMIC
COMMUNITY
Start: 2021-04-06 | End: 2023-11-06

## 2021-04-12 RX ORDER — CIPROFLOXACIN HYDROCHLORIDE 3 MG/ML
SOLUTION/ DROPS OPHTHALMIC
COMMUNITY
Start: 2021-04-06 | End: 2021-10-18

## 2021-04-13 ENCOUNTER — PES CALL (OUTPATIENT)
Dept: ADMINISTRATIVE | Facility: CLINIC | Age: 86
End: 2021-04-13

## 2021-05-11 ENCOUNTER — CLINICAL SUPPORT (OUTPATIENT)
Dept: REHABILITATION | Facility: HOSPITAL | Age: 86
End: 2021-05-11
Attending: INTERNAL MEDICINE
Payer: MEDICARE

## 2021-05-11 DIAGNOSIS — R53.1 DECREASED STRENGTH: ICD-10-CM

## 2021-05-11 DIAGNOSIS — R53.81 PHYSICAL DECONDITIONING: ICD-10-CM

## 2021-05-11 PROCEDURE — 97161 PT EVAL LOW COMPLEX 20 MIN: CPT

## 2021-05-11 PROCEDURE — 97110 THERAPEUTIC EXERCISES: CPT

## 2021-05-19 ENCOUNTER — CLINICAL SUPPORT (OUTPATIENT)
Dept: REHABILITATION | Facility: HOSPITAL | Age: 86
End: 2021-05-19
Attending: INTERNAL MEDICINE
Payer: MEDICARE

## 2021-05-19 DIAGNOSIS — R53.1 DECREASED STRENGTH: ICD-10-CM

## 2021-05-19 PROCEDURE — 97110 THERAPEUTIC EXERCISES: CPT | Mod: CQ

## 2021-05-24 ENCOUNTER — CLINICAL SUPPORT (OUTPATIENT)
Dept: REHABILITATION | Facility: HOSPITAL | Age: 86
End: 2021-05-24
Attending: INTERNAL MEDICINE
Payer: MEDICARE

## 2021-05-24 DIAGNOSIS — R53.1 DECREASED STRENGTH: ICD-10-CM

## 2021-05-24 PROCEDURE — 97112 NEUROMUSCULAR REEDUCATION: CPT | Mod: CQ

## 2021-05-24 PROCEDURE — 97110 THERAPEUTIC EXERCISES: CPT | Mod: CQ

## 2021-05-26 ENCOUNTER — CLINICAL SUPPORT (OUTPATIENT)
Dept: REHABILITATION | Facility: HOSPITAL | Age: 86
End: 2021-05-26
Attending: INTERNAL MEDICINE
Payer: MEDICARE

## 2021-05-26 DIAGNOSIS — R53.1 DECREASED STRENGTH: ICD-10-CM

## 2021-05-26 PROCEDURE — 97110 THERAPEUTIC EXERCISES: CPT

## 2021-05-26 PROCEDURE — 97112 NEUROMUSCULAR REEDUCATION: CPT

## 2021-05-31 ENCOUNTER — CLINICAL SUPPORT (OUTPATIENT)
Dept: REHABILITATION | Facility: HOSPITAL | Age: 86
End: 2021-05-31
Attending: INTERNAL MEDICINE
Payer: MEDICARE

## 2021-05-31 DIAGNOSIS — R53.1 DECREASED STRENGTH: ICD-10-CM

## 2021-05-31 PROCEDURE — 97110 THERAPEUTIC EXERCISES: CPT | Mod: CQ

## 2021-05-31 PROCEDURE — 97112 NEUROMUSCULAR REEDUCATION: CPT | Mod: CQ

## 2021-06-02 ENCOUNTER — CLINICAL SUPPORT (OUTPATIENT)
Dept: REHABILITATION | Facility: HOSPITAL | Age: 86
End: 2021-06-02
Attending: INTERNAL MEDICINE
Payer: MEDICARE

## 2021-06-02 DIAGNOSIS — R53.1 DECREASED STRENGTH: ICD-10-CM

## 2021-06-02 PROCEDURE — 97110 THERAPEUTIC EXERCISES: CPT

## 2021-06-02 PROCEDURE — 97112 NEUROMUSCULAR REEDUCATION: CPT

## 2021-06-07 ENCOUNTER — CLINICAL SUPPORT (OUTPATIENT)
Dept: REHABILITATION | Facility: HOSPITAL | Age: 86
End: 2021-06-07
Attending: INTERNAL MEDICINE
Payer: MEDICARE

## 2021-06-07 DIAGNOSIS — R53.1 DECREASED STRENGTH: ICD-10-CM

## 2021-06-07 PROCEDURE — 97110 THERAPEUTIC EXERCISES: CPT | Mod: CQ

## 2021-06-07 PROCEDURE — 97112 NEUROMUSCULAR REEDUCATION: CPT | Mod: CQ

## 2021-06-10 ENCOUNTER — CLINICAL SUPPORT (OUTPATIENT)
Dept: REHABILITATION | Facility: HOSPITAL | Age: 86
End: 2021-06-10
Attending: INTERNAL MEDICINE
Payer: MEDICARE

## 2021-06-10 DIAGNOSIS — R53.1 DECREASED STRENGTH: ICD-10-CM

## 2021-06-10 PROCEDURE — 97110 THERAPEUTIC EXERCISES: CPT

## 2021-06-16 ENCOUNTER — CLINICAL SUPPORT (OUTPATIENT)
Dept: REHABILITATION | Facility: HOSPITAL | Age: 86
End: 2021-06-16
Attending: INTERNAL MEDICINE
Payer: MEDICARE

## 2021-06-16 DIAGNOSIS — R53.1 DECREASED STRENGTH: ICD-10-CM

## 2021-06-16 PROCEDURE — 97110 THERAPEUTIC EXERCISES: CPT | Mod: CQ

## 2021-06-17 ENCOUNTER — DOCUMENTATION ONLY (OUTPATIENT)
Dept: REHABILITATION | Facility: HOSPITAL | Age: 86
End: 2021-06-17

## 2021-06-18 ENCOUNTER — CLINICAL SUPPORT (OUTPATIENT)
Dept: REHABILITATION | Facility: HOSPITAL | Age: 86
End: 2021-06-18
Attending: INTERNAL MEDICINE
Payer: MEDICARE

## 2021-06-18 DIAGNOSIS — R53.1 DECREASED STRENGTH: ICD-10-CM

## 2021-06-18 PROCEDURE — 97110 THERAPEUTIC EXERCISES: CPT

## 2021-10-11 ENCOUNTER — LAB VISIT (OUTPATIENT)
Dept: LAB | Facility: HOSPITAL | Age: 86
End: 2021-10-11
Attending: INTERNAL MEDICINE
Payer: MEDICARE

## 2021-10-11 DIAGNOSIS — E66.01 MORBID (SEVERE) OBESITY DUE TO EXCESS CALORIES: ICD-10-CM

## 2021-10-11 DIAGNOSIS — N18.30 STAGE 3 CHRONIC KIDNEY DISEASE, UNSPECIFIED WHETHER STAGE 3A OR 3B CKD: ICD-10-CM

## 2021-10-11 DIAGNOSIS — R73.03 PRE-DIABETES: ICD-10-CM

## 2021-10-11 LAB
ALBUMIN SERPL BCP-MCNC: 3.7 G/DL (ref 3.5–5.2)
ALP SERPL-CCNC: 62 U/L (ref 55–135)
ALT SERPL W/O P-5'-P-CCNC: 23 U/L (ref 10–44)
ANION GAP SERPL CALC-SCNC: 11 MMOL/L (ref 8–16)
AST SERPL-CCNC: 18 U/L (ref 10–40)
BASOPHILS # BLD AUTO: 0.05 K/UL (ref 0–0.2)
BASOPHILS NFR BLD: 0.8 % (ref 0–1.9)
BILIRUB SERPL-MCNC: 0.7 MG/DL (ref 0.1–1)
BUN SERPL-MCNC: 17 MG/DL (ref 8–23)
CALCIUM SERPL-MCNC: 10.2 MG/DL (ref 8.7–10.5)
CHLORIDE SERPL-SCNC: 107 MMOL/L (ref 95–110)
CHOLEST SERPL-MCNC: 136 MG/DL (ref 120–199)
CHOLEST/HDLC SERPL: 3.2 {RATIO} (ref 2–5)
CO2 SERPL-SCNC: 22 MMOL/L (ref 23–29)
CREAT SERPL-MCNC: 1.2 MG/DL (ref 0.5–1.4)
DIFFERENTIAL METHOD: ABNORMAL
EOSINOPHIL # BLD AUTO: 0.2 K/UL (ref 0–0.5)
EOSINOPHIL NFR BLD: 3.9 % (ref 0–8)
ERYTHROCYTE [DISTWIDTH] IN BLOOD BY AUTOMATED COUNT: 13.2 % (ref 11.5–14.5)
EST. GFR  (AFRICAN AMERICAN): >60 ML/MIN/1.73 M^2
EST. GFR  (NON AFRICAN AMERICAN): 55.2 ML/MIN/1.73 M^2
ESTIMATED AVG GLUCOSE: 114 MG/DL (ref 68–131)
GLUCOSE SERPL-MCNC: 115 MG/DL (ref 70–110)
HBA1C MFR BLD: 5.6 % (ref 4–5.6)
HCT VFR BLD AUTO: 41.6 % (ref 40–54)
HDLC SERPL-MCNC: 42 MG/DL (ref 40–75)
HDLC SERPL: 30.9 % (ref 20–50)
HGB BLD-MCNC: 13.8 G/DL (ref 14–18)
IMM GRANULOCYTES # BLD AUTO: 0.01 K/UL (ref 0–0.04)
IMM GRANULOCYTES NFR BLD AUTO: 0.2 % (ref 0–0.5)
LDLC SERPL CALC-MCNC: 80.2 MG/DL (ref 63–159)
LYMPHOCYTES # BLD AUTO: 1.8 K/UL (ref 1–4.8)
LYMPHOCYTES NFR BLD: 28.1 % (ref 18–48)
MCH RBC QN AUTO: 31.4 PG (ref 27–31)
MCHC RBC AUTO-ENTMCNC: 33.2 G/DL (ref 32–36)
MCV RBC AUTO: 95 FL (ref 82–98)
MONOCYTES # BLD AUTO: 0.4 K/UL (ref 0.3–1)
MONOCYTES NFR BLD: 6.3 % (ref 4–15)
NEUTROPHILS # BLD AUTO: 3.8 K/UL (ref 1.8–7.7)
NEUTROPHILS NFR BLD: 60.7 % (ref 38–73)
NONHDLC SERPL-MCNC: 94 MG/DL
NRBC BLD-RTO: 0 /100 WBC
PLATELET # BLD AUTO: 287 K/UL (ref 150–450)
PMV BLD AUTO: 10.2 FL (ref 9.2–12.9)
POTASSIUM SERPL-SCNC: 4.5 MMOL/L (ref 3.5–5.1)
PROT SERPL-MCNC: 7 G/DL (ref 6–8.4)
RBC # BLD AUTO: 4.4 M/UL (ref 4.6–6.2)
SODIUM SERPL-SCNC: 140 MMOL/L (ref 136–145)
TRIGL SERPL-MCNC: 69 MG/DL (ref 30–150)
WBC # BLD AUTO: 6.23 K/UL (ref 3.9–12.7)

## 2021-10-11 PROCEDURE — 80053 COMPREHEN METABOLIC PANEL: CPT | Performed by: INTERNAL MEDICINE

## 2021-10-11 PROCEDURE — 36415 COLL VENOUS BLD VENIPUNCTURE: CPT | Performed by: INTERNAL MEDICINE

## 2021-10-11 PROCEDURE — 83036 HEMOGLOBIN GLYCOSYLATED A1C: CPT | Performed by: INTERNAL MEDICINE

## 2021-10-11 PROCEDURE — 85025 COMPLETE CBC W/AUTO DIFF WBC: CPT | Performed by: INTERNAL MEDICINE

## 2021-10-11 PROCEDURE — 80061 LIPID PANEL: CPT | Performed by: INTERNAL MEDICINE

## 2021-10-18 ENCOUNTER — OFFICE VISIT (OUTPATIENT)
Dept: INTERNAL MEDICINE | Facility: CLINIC | Age: 86
End: 2021-10-18
Payer: MEDICARE

## 2021-10-18 VITALS
HEIGHT: 67 IN | BODY MASS INDEX: 39.1 KG/M2 | SYSTOLIC BLOOD PRESSURE: 100 MMHG | WEIGHT: 249.13 LBS | OXYGEN SATURATION: 99 % | DIASTOLIC BLOOD PRESSURE: 58 MMHG | HEART RATE: 68 BPM

## 2021-10-18 DIAGNOSIS — E78.2 MIXED HYPERLIPIDEMIA: Primary | ICD-10-CM

## 2021-10-18 DIAGNOSIS — N18.30 STAGE 3 CHRONIC KIDNEY DISEASE, UNSPECIFIED WHETHER STAGE 3A OR 3B CKD: ICD-10-CM

## 2021-10-18 DIAGNOSIS — R73.03 PRE-DIABETES: ICD-10-CM

## 2021-10-18 PROCEDURE — 99999 PR PBB SHADOW E&M-EST. PATIENT-LVL IV: CPT | Mod: PBBFAC,,, | Performed by: INTERNAL MEDICINE

## 2021-10-18 PROCEDURE — 99214 PR OFFICE/OUTPT VISIT, EST, LEVL IV, 30-39 MIN: ICD-10-PCS | Mod: S$PBB,,, | Performed by: INTERNAL MEDICINE

## 2021-10-18 PROCEDURE — 99214 OFFICE O/P EST MOD 30 MIN: CPT | Mod: PBBFAC,25 | Performed by: INTERNAL MEDICINE

## 2021-10-18 PROCEDURE — 99214 OFFICE O/P EST MOD 30 MIN: CPT | Mod: S$PBB,,, | Performed by: INTERNAL MEDICINE

## 2021-10-18 PROCEDURE — 99999 PR PBB SHADOW E&M-EST. PATIENT-LVL IV: ICD-10-PCS | Mod: PBBFAC,,, | Performed by: INTERNAL MEDICINE

## 2021-10-23 ENCOUNTER — PATIENT MESSAGE (OUTPATIENT)
Dept: INTERNAL MEDICINE | Facility: CLINIC | Age: 86
End: 2021-10-23
Payer: MEDICARE

## 2021-10-24 DIAGNOSIS — N40.1 BENIGN PROSTATIC HYPERPLASIA WITH URINARY OBSTRUCTION: ICD-10-CM

## 2021-10-24 DIAGNOSIS — N13.8 BENIGN PROSTATIC HYPERPLASIA WITH URINARY OBSTRUCTION: ICD-10-CM

## 2021-10-25 RX ORDER — TAMSULOSIN HYDROCHLORIDE 0.4 MG/1
CAPSULE ORAL
Qty: 90 CAPSULE | Refills: 3 | Status: SHIPPED | OUTPATIENT
Start: 2021-10-25 | End: 2022-10-21

## 2021-11-03 ENCOUNTER — PATIENT MESSAGE (OUTPATIENT)
Dept: INTERNAL MEDICINE | Facility: CLINIC | Age: 86
End: 2021-11-03
Payer: MEDICARE

## 2021-11-05 ENCOUNTER — IMMUNIZATION (OUTPATIENT)
Dept: INTERNAL MEDICINE | Facility: CLINIC | Age: 86
End: 2021-11-05
Payer: MEDICARE

## 2021-11-05 DIAGNOSIS — Z23 NEED FOR VACCINATION: Primary | ICD-10-CM

## 2021-11-05 PROCEDURE — 0064A COVID-19, MRNA, LNP-S, PF, 100 MCG/0.25 ML DOSE VACCINE (MODERNA BOOSTER): CPT | Mod: PBBFAC,CV19

## 2021-11-11 ENCOUNTER — PES CALL (OUTPATIENT)
Dept: ADMINISTRATIVE | Facility: CLINIC | Age: 86
End: 2021-11-11
Payer: MEDICARE

## 2021-11-13 NOTE — PROGRESS NOTES
"Subjective:       Patient ID: Grey Vance Jr. is a 83 y.o. male.    Chief Complaint: Establish Care (seeking out a new primary care doctor, former pcp retired. transfer care pt.)    HPI   Grey Vnace Jr. is a 83 y.o. male here to establish care. He is transferring from Dr. Sosa.  He had physial exam with her in June 2017.     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    Recent labs with a1c 5.6% and nl CMP.     Murmur heard on exam June 2017 - echo nl except mild left atrial enlargement and mildly sclertotic mitral and aortic valves.        -------------------  1. HPL              Simvastatin 20 mg hs              10 yr ASCVD Risk = 24%     2. BPH              Flomax 0.4 mg/day      3. ED       4. Pre-DM     5. Obesity (BMI-34)     6. Hx of Vit D Deficiency              Cholecalciferol 10,000 units/ week       7. CKD Stage 2-3     9. Partial Tear Right Rotator Cuff           Followed by Dr. Haley         10. Left Ulnar Neuropathy - Carpal Tunnel           Evaluated by Dr. Barbi Morfin 8/26/14           Rec: PT and NSAIDS     11. Mild DJD Left Hip    Review of Systems   Constitutional: Negative for fever.   Respiratory: Negative for shortness of breath.    Cardiovascular: Negative for chest pain.   Musculoskeletal: Negative.    Skin: Negative.        Objective:   /63 (BP Location: Right arm, Patient Position: Sitting, BP Method: Large (Manual))   Pulse 61   Ht 5' 7" (1.702 m)   Wt 103.4 kg (228 lb)   SpO2 97%   BMI 35.71 kg/m²      Physical Exam   Constitutional: He is oriented to person, place, and time. He appears well-developed and well-nourished. No distress.   HENT:   Head: Normocephalic and atraumatic.   Cardiovascular: Normal rate and regular rhythm.  "   No murmur heard.  Pulmonary/Chest: Effort normal. No respiratory distress. He has no wheezes. He has no rales.   Neurological: He is alert and oriented to person, place, and time.   Skin: Skin is warm and dry. He is not diaphoretic.   Psychiatric: He has a normal mood and affect. His behavior is normal.       Assessment:       1. Hyperlipidemia, unspecified hyperlipidemia type    2. CKD (chronic kidney disease) stage 3, GFR 30-59 ml/min    3. Obesity (BMI 35.0-39.9 without comorbidity)    4. Special screening for malignant neoplasm of prostate    5. Prediabetes    6. Vitamin D deficiency        Plan:       Grey was seen today for [facility] care.    Diagnoses and all orders for this visit:    Hyperlipidemia, unspecified hyperlipidemia type  -     CBC auto differential; Future  -     Comprehensive metabolic panel; Future  -     Hemoglobin A1c; Future  -     Lipid panel; Future  -     TSH; Future  -     Vitamin D; Future    CKD (chronic kidney disease) stage 3, GFR 30-59 ml/min  -     Comprehensive metabolic panel; Future    Obesity (BMI 35.0-39.9 without comorbidity)  -     CBC auto differential; Future  -     Comprehensive metabolic panel; Future  -     Hemoglobin A1c; Future  -     Lipid panel; Future  -     TSH; Future  -     PSA, Screening; Future  -     Vitamin D; Future    Special screening for malignant neoplasm of prostate  -     PSA, Screening; Future    Prediabetes  -     Hemoglobin A1c; Future    Vitamin D deficiency  -     Vitamin D; Future           Cranial Nerves II - XII: II: PERRLA; visual fields are full to confrontation III, IV, VI: EOMI, no nystagmus appreciated V: facial sensation intact to light touch V1-V3 b/l VII: no ptosis, no facial droop, symmetric eyebrow raise and closure VIII: hearing intact to finger rub b/l  XI: head turning and shoulder shrug intact b/l XII: tongue protrusion midline. Visual tracking and vision quadrant test WFL, pt states she does wear glasses and contact for distance.

## 2022-01-13 DIAGNOSIS — E78.5 HYPERLIPIDEMIA: ICD-10-CM

## 2022-01-13 NOTE — TELEPHONE ENCOUNTER
No new care gaps identified.  Powered by [place] by [employer]. Reference number: [id number].   1/13/2022 11:38:10 AM CST

## 2022-01-18 DIAGNOSIS — E78.5 HYPERLIPIDEMIA: ICD-10-CM

## 2022-01-18 RX ORDER — SIMVASTATIN 40 MG/1
TABLET, FILM COATED ORAL
Qty: 45 TABLET | Refills: 4 | Status: SHIPPED | OUTPATIENT
Start: 2022-01-18 | End: 2023-01-23

## 2022-01-18 NOTE — TELEPHONE ENCOUNTER
No new care gaps identified.  Powered by [place] [employer]. Reference number: [id number].   1/18/2022 2:25:31 PM CST

## 2022-01-18 NOTE — TELEPHONE ENCOUNTER
----- Message from Drea Ruiz sent at 1/18/2022  2:11 PM CST -----  Regarding: refill  Contact: patient  [phone]  Requesting an RX refill or new RX.  Is this a refill or new RX: refill  RX name and strength (simvastatin (ZOCOR) 40 MG tablet    Is this a 30 day or 90 day RX: 90  Patient advised that in the future they can use their [facility] account to request a refill?:  yes  Patient advised that RX refills can take up to 72 hours?:  yes  Pharmacy name and phone #  [id number] - [place] - [address]  AT [facility] [address]  [address] [place] [address]  Phone: [phone] Fax: [phone]      Comments:

## 2022-01-24 RX ORDER — SIMVASTATIN 40 MG/1
TABLET, FILM COATED ORAL
Qty: 45 TABLET | Refills: 4 | OUTPATIENT
Start: 2022-01-24

## 2022-02-04 ENCOUNTER — PATIENT OUTREACH (OUTPATIENT)
Dept: ADMINISTRATIVE | Facility: HOSPITAL | Age: 87
End: 2022-02-04
Payer: MEDICARE

## 2022-02-04 NOTE — PROGRESS NOTES
Health Maintenance Due   Topic Date Due    Shingles Vaccine (2 of 3) 07/09/2015       HM updated. POA letter scanned into chart.     Eduarda Abreu LPN   Clinical Care Coordinator  Primary Care and Wellness

## 2022-03-28 ENCOUNTER — HOSPITAL ENCOUNTER (EMERGENCY)
Facility: HOSPITAL | Age: 87
Discharge: HOME | End: 2022-03-28
Attending: EMERGENCY MEDICINE
Payer: MEDICARE

## 2022-03-28 VITALS
DIASTOLIC BLOOD PRESSURE: 75 MMHG | RESPIRATION RATE: 18 BRPM | BODY MASS INDEX: 36.1 KG/M2 | TEMPERATURE: 98 F | SYSTOLIC BLOOD PRESSURE: 139 MMHG | WEIGHT: 230 LBS | HEIGHT: 67 IN | HEART RATE: 74 BPM | OXYGEN SATURATION: 97 %

## 2022-03-28 DIAGNOSIS — M79.671 RIGHT FOOT PAIN: ICD-10-CM

## 2022-03-28 DIAGNOSIS — S92.504A CLOSED NONDISPLACED FRACTURE OF PHALANX OF LESSER TOE OF RIGHT FOOT, UNSPECIFIED PHALANX, INITIAL ENCOUNTER: Primary | ICD-10-CM

## 2022-03-28 DIAGNOSIS — S60.419A ABRASION OF FINGER OF RIGHT HAND, INITIAL ENCOUNTER: ICD-10-CM

## 2022-03-28 PROCEDURE — 99284 EMERGENCY DEPT VISIT MOD MDM: CPT | Mod: ,,, | Performed by: NURSE PRACTITIONER

## 2022-03-28 PROCEDURE — 99284 PR EMERGENCY DEPT VISIT,LEVEL IV: ICD-10-PCS | Mod: ,,, | Performed by: NURSE PRACTITIONER

## 2022-03-28 PROCEDURE — 25000003 PHARM REV CODE 250: Performed by: NURSE PRACTITIONER

## 2022-03-28 PROCEDURE — 99283 EMERGENCY DEPT VISIT LOW MDM: CPT | Mod: 25

## 2022-03-28 RX ORDER — BACITRACIN ZINC AND POLYMYXIN B SULFATE 500; 1000 [USP'U]/G; [USP'U]/G
1 OINTMENT TOPICAL
Status: COMPLETED | OUTPATIENT
Start: 2022-03-28 | End: 2022-03-28

## 2022-03-28 RX ADMIN — Medication 28.4 G: at 02:03

## 2022-03-28 NOTE — ED NOTES
NP Aimee at bedside for update on plan of care. Awaiting XR results, pt requesting to be discharged and called with results. NP agrees to call pt with XR result. Pt ambulatory to exit with steady gait.

## 2022-03-28 NOTE — ED PROVIDER NOTES
Chief complaint:  Toe Injury (Hit r little toe on door frame, put it back in place, still hurting)      HPI:  Grey Vance Jr. is a 86 y.o. male presenting to the emergency department today for evaluation of the right pinky toe pain.  He states that he was walking from his bedroom to the shower when he accidentally caught his toe on a door frame.  He states that it I when he hit it on the door, it bent sideways. He was able to put it back in place.  He states it is now sore.  He denies limited range of motion.  He denies weakness or numbness.  The pain is worse with weight-bearing and ambulation.  He states that when he caught his toe on the door frame, he grabbed the door with his hand and cut his right thumb.  He cleansed the wound and was able to stop the bleeding.  He has a Band-Aid on it now.  His last tetanus shot was 12/01/2017.  He denies falling.  He denies head injury, loss of consciousness, neck or back pain or any other extremity injuries.  No other problems or concerns voiced at this time.      Review of patient's allergies indicates:   Allergen Reactions    Bee pollens        No current facility-administered medications on file prior to encounter.     Current Outpatient Medications on File Prior to Encounter   Medication Sig Dispense Refill    aspirin (ECOTRIN) 81 MG EC tablet Take 81 mg by mouth once daily.      brimonidine 0.15 % OPTH DROP (ALPHAGAN) 0.15 % ophthalmic solution PLACE 1 GTT AUGUSTO BID      CALCIUM CITRATE/VITAMIN D2 (CALCIUM CITRATE WITH D ORAL) Take by mouth once daily.      CHOLECALCIFEROL, VITAMIN D3, (VITAMIN D3 ORAL) Take 10,000 Units by mouth once a week. Take one weekly      folic acid (FOLVITE) 1 MG tablet Take 1 mg by mouth once daily.      GLUCOSAM SUL NA/CHONDR DWYER A NA (GLUCOSAMINE-CHONDROITIN 3X ORAL) Take 2 tablets by mouth once daily.      LUMIGAN 0.01 % Drop INT 1 GTT IN OS HS      multivitamin (ONE DAILY MULTIVITAMIN) per tablet Take 1 tablet by mouth once  daily.      prednisoLONE acetate (PRED FORTE) 1 % DrpS SHAKE LIQUID AND INSTILL 1 DROP IN LEFT EYE FOUR TIMES DAILY      simvastatin (ZOCOR) 40 MG tablet TAKE 1/2 TABLET(20 MG) BY MOUTH EVERY EVENING 45 tablet 4    tamsulosin (FLOMAX) 0.4 mg Cap TAKE 1 CAPSULE(0.4 MG) BY MOUTH EVERY EVENING 90 capsule 3       PMH:  As per HPI and below:  Past Medical History:   Diagnosis Date    BPH with elevated PSA     Cataract     CKD (chronic kidney disease) stage 3, GFR 30-59 ml/min     Fever blister     H/O impaired glucose tolerance 2014    Hyperlipidemia     Pre-diabetes     Primary osteoarthritis of left hip 11/3/2015     Past Surgical History:   Procedure Laterality Date    THROAT SURGERY  20years    TONSILLECTOMY      VASECTOMY         Social History     Socioeconomic History    Marital status:    Occupational History    Occupation:      Employer: RETIRED   Tobacco Use    Smoking status: Former Smoker     Types: Cigarettes     Quit date: 3/8/1969     Years since quittin.0    Smokeless tobacco: Never Used   Substance and Sexual Activity    Alcohol use: No     Alcohol/week: 0.0 standard drinks     Comment: none since age 52    Drug use: No    Sexual activity: Yes     Partners: Female       Family History   Problem Relation Age of Onset    Melanoma Neg Hx        Review of Systems  As per HPI and Below  Constitutional: Negative for chills and fever.   HENT: Negative for congestion and sinus pressure.    Eyes: Negative for visual disturbance.   Respiratory: Negative for cough, chest tightness and shortness of breath.    Cardiovascular: Negative for chest pain.   Gastrointestinal: Negative for abdominal pain, diarrhea, nausea and vomiting.   Genitourinary: Negative for flank pain. Negative for dysuria.  Musculoskeletal: Positive for toe pain.   Skin: Positive for wound.  Neurological: Negative for dizziness. Negative for weakness and numbness.   Psychiatric/Behavioral:  Negative for confusion.       Physical Exam:    Vitals:    03/28/22 1359   BP: 139/75   Pulse: 74   Resp: 18   Temp: 98.1 °F (36.7 °C)     Nursing note and vitals reviewed.  Constitutional: Patient appears well-developed and well-nourished. Not diaphoretic. No distress.   HENT:   Head: Normocephalic and atraumatic.   Eyes: Conjunctivae are normal. No scleral icterus.   Neck: Normal range of motion. Neck supple.   Cardiovascular: Non pitting edema to bilateral lower extremities, pt states this is baseline.   Pulmonary/Chest: Breath sounds normal. No respiratory distress.  Abdominal: Soft. There is no tenderness.   Musculoskeletal: Pt has mild TTP of the right 5th toe. No gross deformity. Normal ROM. Normal pedal pulse. There is no swelling or bruising. No bony spinal tenderness. No other acute abnormalities note on musculoskeletal exam.   Neurological: Alert and oriented to person, place, and time. Normal strength. No sensory deficit.   Skin: There is a superficial small flap shaped laceration noted to the palmar aspect of the distal right thumb. No foreign body. No active bleeding. No swelling, drainage or odor.   Psychiatric: Normal mood and affect. Thought content normal.       Labs Reviewed - No data to display    Imaging Results           X-Ray Foot Complete Right (Final result)  Result time 03/28/22 16:47:56    Final result by Harjinder Becerra MD (03/28/22 16:47:56)                 Impression:      Fifth proximal phalanx suspected acute fracture, as above.    This report was flagged in Epic as abnormal.      Electronically signed by: Harjinder Becerra MD  Date:    03/28/2022  Time:    16:47             Narrative:    EXAMINATION:  XR FOOT COMPLETE 3 VIEW RIGHT    CLINICAL HISTORY:  . Pain in right foot    TECHNIQUE:  AP, lateral, and oblique views of the right foot were performed.    COMPARISON:  None    FINDINGS:  Mild hallux valgus configuration.  There is generalized osteopenia.  There is suspected acute appearing  spiral type fracture with slight displacement involving the diaphysis and distal metaphysis of the 5th proximal phalanx, without significant angulation.  No dislocation or destructive osseous process.  Moderate to advanced degenerative change at the 1st MTP joint.  Minimal to mild degenerative change elsewhere.  Lisfranc articulation is congruent.  No dislocation or destructive osseous process.  Small plantar calcaneal spur.                                No results found.    Procedures      MDM:    86 y.o. male presenting for evaluation of pain to the right fifth toe after hitting the toe on the doorjam. He is neurovascularly intact distal to the injury. Normal ROM. Has pain with ambulation. X-rays obtained. Pt requested to leave prior to radiology report. He has a slightly displaced closed fracture of the right 5th proximal phalanx. I called him and discussed these findings with him on the phone. Advised that he wear firm soled shoes and follow up outpatient with PCP within 1 week. Advised that he can purchased this shoe at the drug store. Additionally, he has a small superficial abrasion to the right thumb from catching himself when he hit his toe. His tetanus shot is UTD. Wound care provided and wound care instructions were given to the patient. ED return precautions discussed.            Diagnoses:  The primary encounter diagnosis was Closed nondisplaced fracture of phalanx of lesser toe of right foot, unspecified phalanx, initial encounter. Diagnoses of Right foot pain, Right foot pain, and Abrasion of finger of right hand, initial encounter were also pertinent to this visit.         Aimee Jimenez NP  03/29/22 3321

## 2022-04-05 ENCOUNTER — PATIENT MESSAGE (OUTPATIENT)
Dept: PHARMACY | Facility: CLINIC | Age: 87
End: 2022-04-05
Payer: MEDICARE

## 2022-04-05 ENCOUNTER — OFFICE VISIT (OUTPATIENT)
Dept: INTERNAL MEDICINE | Facility: CLINIC | Age: 87
End: 2022-04-05
Payer: MEDICARE

## 2022-04-05 ENCOUNTER — LAB VISIT (OUTPATIENT)
Dept: LAB | Facility: HOSPITAL | Age: 87
End: 2022-04-05
Attending: INTERNAL MEDICINE
Payer: MEDICARE

## 2022-04-05 VITALS
HEIGHT: 67 IN | OXYGEN SATURATION: 97 % | WEIGHT: 254.31 LBS | DIASTOLIC BLOOD PRESSURE: 78 MMHG | SYSTOLIC BLOOD PRESSURE: 136 MMHG | HEART RATE: 66 BPM | BODY MASS INDEX: 39.91 KG/M2

## 2022-04-05 DIAGNOSIS — R73.03 PRE-DIABETES: ICD-10-CM

## 2022-04-05 DIAGNOSIS — N18.30 STAGE 3 CHRONIC KIDNEY DISEASE, UNSPECIFIED WHETHER STAGE 3A OR 3B CKD: ICD-10-CM

## 2022-04-05 DIAGNOSIS — N18.30 STAGE 3 CHRONIC KIDNEY DISEASE, UNSPECIFIED WHETHER STAGE 3A OR 3B CKD: Primary | ICD-10-CM

## 2022-04-05 DIAGNOSIS — S92.514D CLOSED NONDISPLACED FRACTURE OF PROXIMAL PHALANX OF LESSER TOE OF RIGHT FOOT WITH ROUTINE HEALING, SUBSEQUENT ENCOUNTER: ICD-10-CM

## 2022-04-05 LAB
ALBUMIN SERPL BCP-MCNC: 3.8 G/DL (ref 3.5–5.2)
ALP SERPL-CCNC: 62 U/L (ref 55–135)
ALT SERPL W/O P-5'-P-CCNC: 21 U/L (ref 10–44)
ANION GAP SERPL CALC-SCNC: 7 MMOL/L (ref 8–16)
AST SERPL-CCNC: 18 U/L (ref 10–40)
BILIRUB SERPL-MCNC: 0.5 MG/DL (ref 0.1–1)
BUN SERPL-MCNC: 20 MG/DL (ref 8–23)
CALCIUM SERPL-MCNC: 10 MG/DL (ref 8.7–10.5)
CHLORIDE SERPL-SCNC: 106 MMOL/L (ref 95–110)
CO2 SERPL-SCNC: 25 MMOL/L (ref 23–29)
CREAT SERPL-MCNC: 1.1 MG/DL (ref 0.5–1.4)
EST. GFR  (AFRICAN AMERICAN): >60 ML/MIN/1.73 M^2
EST. GFR  (NON AFRICAN AMERICAN): >60 ML/MIN/1.73 M^2
ESTIMATED AVG GLUCOSE: 126 MG/DL (ref 68–131)
GLUCOSE SERPL-MCNC: 101 MG/DL (ref 70–110)
HBA1C MFR BLD: 6 % (ref 4–5.6)
POTASSIUM SERPL-SCNC: 4.4 MMOL/L (ref 3.5–5.1)
PROT SERPL-MCNC: 7.1 G/DL (ref 6–8.4)
SODIUM SERPL-SCNC: 138 MMOL/L (ref 136–145)

## 2022-04-05 PROCEDURE — 99214 OFFICE O/P EST MOD 30 MIN: CPT | Mod: S$PBB,,, | Performed by: INTERNAL MEDICINE

## 2022-04-05 PROCEDURE — 99214 PR OFFICE/OUTPT VISIT, EST, LEVL IV, 30-39 MIN: ICD-10-PCS | Mod: S$PBB,,, | Performed by: INTERNAL MEDICINE

## 2022-04-05 PROCEDURE — 99999 PR PBB SHADOW E&M-EST. PATIENT-LVL IV: CPT | Mod: PBBFAC,,, | Performed by: INTERNAL MEDICINE

## 2022-04-05 PROCEDURE — 99214 OFFICE O/P EST MOD 30 MIN: CPT | Mod: PBBFAC | Performed by: INTERNAL MEDICINE

## 2022-04-05 PROCEDURE — 36415 COLL VENOUS BLD VENIPUNCTURE: CPT | Performed by: INTERNAL MEDICINE

## 2022-04-05 PROCEDURE — 99999 PR PBB SHADOW E&M-EST. PATIENT-LVL IV: ICD-10-PCS | Mod: PBBFAC,,, | Performed by: INTERNAL MEDICINE

## 2022-04-05 PROCEDURE — 83036 HEMOGLOBIN GLYCOSYLATED A1C: CPT | Performed by: INTERNAL MEDICINE

## 2022-04-05 PROCEDURE — 80053 COMPREHEN METABOLIC PANEL: CPT | Performed by: INTERNAL MEDICINE

## 2022-04-05 NOTE — PROGRESS NOTES
"Subjective:       Patient ID: Grey Vance Jr. is a 86 y.o. male.    Chief Complaint: Hospital Follow Up    HPI   86 y.o. male presents for an Emergency Department follow up visit. I have reviewed ED note as well as all relevant laboratory and pathology results and imaging.     Patient was seen on 3/28/22 for right fifth toe fracture sustained after hitting foot in door frame. Also with small laceration of right thumb.         Review of Systems   Constitutional: Negative for fever.   Respiratory: Negative for shortness of breath.    Cardiovascular: Negative for chest pain.   Musculoskeletal: Negative.    Skin: Negative.        Objective:   /78 (BP Location: Left arm, Patient Position: Sitting, BP Method: Medium (Manual))   Pulse 66   Ht 5' 7" (1.702 m)   Wt 115.4 kg (254 lb 4.8 oz)   SpO2 97%   BMI 39.83 kg/m²      Physical Exam  Constitutional:       General: He is not in acute distress.     Appearance: He is well-developed. He is not diaphoretic.   HENT:      Head: Normocephalic and atraumatic.   Cardiovascular:      Rate and Rhythm: Normal rate.   Pulmonary:      Effort: Pulmonary effort is normal. No respiratory distress.   Musculoskeletal:      Comments: 5th right toe without significant swelling, mildly ttp   Skin:     General: Skin is warm and dry.   Neurological:      Mental Status: He is alert and oriented to person, place, and time.   Psychiatric:         Behavior: Behavior normal.         Assessment:       1. Stage 3 chronic kidney disease, unspecified whether stage 3a or 3b CKD    2. BMI 39.0-39.9,adult    3. Pre-diabetes    4. Closed nondisplaced fracture of proximal phalanx of lesser toe of right foot with routine healing, subsequent encounter        Plan:       Grey was seen today for hospital follow up.    Diagnoses and all orders for this visit:    5th right toe fracture  Monitor    Stage 3 chronic kidney disease, unspecified whether stage 3a or 3b CKD  -     Comprehensive Metabolic " Panel; Future    BMI 39.0-39.9,adult    Pre-diabetes  -     Hemoglobin A1C; Future

## 2022-04-06 ENCOUNTER — IMMUNIZATION (OUTPATIENT)
Dept: PHARMACY | Facility: CLINIC | Age: 87
End: 2022-04-06
Payer: MEDICARE

## 2022-04-06 DIAGNOSIS — Z23 NEED FOR VACCINATION: Primary | ICD-10-CM

## 2022-07-20 ENCOUNTER — OFFICE VISIT (OUTPATIENT)
Dept: INTERNAL MEDICINE | Facility: CLINIC | Age: 87
End: 2022-07-20
Payer: MEDICARE

## 2022-07-20 VITALS
BODY MASS INDEX: 40.71 KG/M2 | RESPIRATION RATE: 14 BRPM | DIASTOLIC BLOOD PRESSURE: 74 MMHG | HEIGHT: 66 IN | OXYGEN SATURATION: 97 % | WEIGHT: 253.31 LBS | HEART RATE: 80 BPM | SYSTOLIC BLOOD PRESSURE: 122 MMHG

## 2022-07-20 DIAGNOSIS — M70.21 OLECRANON BURSITIS, RIGHT ELBOW: Primary | ICD-10-CM

## 2022-07-20 PROCEDURE — 99213 OFFICE O/P EST LOW 20 MIN: CPT | Mod: S$PBB,,, | Performed by: PHYSICIAN ASSISTANT

## 2022-07-20 PROCEDURE — 99214 OFFICE O/P EST MOD 30 MIN: CPT | Mod: PBBFAC | Performed by: PHYSICIAN ASSISTANT

## 2022-07-20 PROCEDURE — 99999 PR PBB SHADOW E&M-EST. PATIENT-LVL IV: ICD-10-PCS | Mod: PBBFAC,,, | Performed by: PHYSICIAN ASSISTANT

## 2022-07-20 PROCEDURE — 99213 PR OFFICE/OUTPT VISIT, EST, LEVL III, 20-29 MIN: ICD-10-PCS | Mod: S$PBB,,, | Performed by: PHYSICIAN ASSISTANT

## 2022-07-20 PROCEDURE — 99999 PR PBB SHADOW E&M-EST. PATIENT-LVL IV: CPT | Mod: PBBFAC,,, | Performed by: PHYSICIAN ASSISTANT

## 2022-07-20 NOTE — PATIENT INSTRUCTIONS
Elbow sleeve/compression    If any signs of redness, warmth, pain or fever please let us know and we will have you see orthopedics

## 2022-07-20 NOTE — PROGRESS NOTES
"Subjective:       Patient ID: Grey Vance Jr. is a 87 y.o. male.    Chief Complaint: Mass    HPI     Established pt of Maryjane Adkins MD (new to me)    Same day/urgent care appt    Here with concern of bump to his right elbow, noticed a few days ago.   No pain, redness, n/t, bruising, warmth or fever.   No injury but does report reading the newspaper every morning leaning on his elbows.     Past Medical History:   Diagnosis Date    BPH with elevated PSA     Cataract     CKD (chronic kidney disease) stage 3, GFR 30-59 ml/min     Fever blister     H/O impaired glucose tolerance 2014    Hyperlipidemia     Pre-diabetes     Primary osteoarthritis of left hip 11/3/2015     Social History     Tobacco Use    Smoking status: Former Smoker     Types: Cigarettes     Quit date: 3/8/1969     Years since quittin.4    Smokeless tobacco: Never Used   Substance Use Topics    Alcohol use: No     Alcohol/week: 0.0 standard drinks     Comment: none since age 52    Drug use: No     Review of patient's allergies indicates:   Allergen Reactions    Bee pollens        Review of Systems   Constitutional: Negative for chills, fever and unexpected weight change.   Respiratory: Negative for cough and shortness of breath.    Cardiovascular: Negative for chest pain and leg swelling.   Gastrointestinal: Negative for abdominal pain, nausea and vomiting.   Musculoskeletal:        As per HPI   Integumentary:  Negative for rash.   Neurological: Negative for weakness, light-headedness and headaches.         Objective: /74 (BP Location: Left arm, Patient Position: Sitting, BP Method: Large (Manual))   Pulse 80   Resp 14   Ht 5' 6" (1.676 m)   Wt 114.9 kg (253 lb 4.9 oz)   SpO2 97%   BMI 40.89 kg/m²         Physical Exam  Constitutional:       General: He is not in acute distress.     Appearance: He is obese.   Pulmonary:      Effort: Pulmonary effort is normal. No respiratory distress.   Musculoskeletal:      " Right elbow: Swelling present. Normal range of motion. No tenderness.      Right lower leg: No edema.      Left lower leg: No edema.      Comments: Large subq mass(approx 4cm) to right elbow, (no warmth, pain or erythema)   Skin:     Findings: No erythema or rash.   Neurological:      Mental Status: He is alert.         Assessment:       Problem List Items Addressed This Visit    None     Visit Diagnoses     Olecranon bursitis, right elbow    -  Primary          Plan:       Grey was seen today for mass.    Diagnoses and all orders for this visit:    Olecranon bursitis, right elbow    No s/s of infection  Full ROM of RUE, no warmth or eyrthemia  Advised on conservative measures (consistent compression ace wrap/elbow sleeve w/padding)  Notify clinic if symptoms worsen or no improvement over the next 2 weeks    Gloria Grider PA-C

## 2022-08-31 ENCOUNTER — OFFICE VISIT (OUTPATIENT)
Dept: URGENT CARE | Facility: CLINIC | Age: 87
End: 2022-08-31
Payer: MEDICARE

## 2022-08-31 VITALS
WEIGHT: 230 LBS | SYSTOLIC BLOOD PRESSURE: 143 MMHG | HEART RATE: 83 BPM | TEMPERATURE: 99 F | HEIGHT: 66 IN | OXYGEN SATURATION: 96 % | DIASTOLIC BLOOD PRESSURE: 77 MMHG | BODY MASS INDEX: 36.96 KG/M2 | RESPIRATION RATE: 18 BRPM

## 2022-08-31 DIAGNOSIS — B97.89 VIRAL RESPIRATORY INFECTION: Primary | ICD-10-CM

## 2022-08-31 DIAGNOSIS — J98.8 VIRAL RESPIRATORY INFECTION: Primary | ICD-10-CM

## 2022-08-31 LAB
CTP QC/QA: YES
SARS-COV-2 RDRP RESP QL NAA+PROBE: NEGATIVE

## 2022-08-31 PROCEDURE — U0002: ICD-10-PCS | Mod: QW,CR,S$GLB, | Performed by: STUDENT IN AN ORGANIZED HEALTH CARE EDUCATION/TRAINING PROGRAM

## 2022-08-31 PROCEDURE — 99214 OFFICE O/P EST MOD 30 MIN: CPT | Mod: S$GLB,,, | Performed by: STUDENT IN AN ORGANIZED HEALTH CARE EDUCATION/TRAINING PROGRAM

## 2022-08-31 PROCEDURE — U0002 COVID-19 LAB TEST NON-CDC: HCPCS | Mod: QW,CR,S$GLB, | Performed by: STUDENT IN AN ORGANIZED HEALTH CARE EDUCATION/TRAINING PROGRAM

## 2022-08-31 PROCEDURE — 71046 XR CHEST PA AND LATERAL: ICD-10-PCS | Mod: FY,S$GLB,, | Performed by: RADIOLOGY

## 2022-08-31 PROCEDURE — 71046 X-RAY EXAM CHEST 2 VIEWS: CPT | Mod: FY,S$GLB,, | Performed by: RADIOLOGY

## 2022-08-31 PROCEDURE — 99214 PR OFFICE/OUTPT VISIT, EST, LEVL IV, 30-39 MIN: ICD-10-PCS | Mod: S$GLB,,, | Performed by: STUDENT IN AN ORGANIZED HEALTH CARE EDUCATION/TRAINING PROGRAM

## 2022-08-31 RX ORDER — BENZONATATE 100 MG/1
200 CAPSULE ORAL 3 TIMES DAILY PRN
Qty: 40 CAPSULE | Refills: 0 | Status: SHIPPED | OUTPATIENT
Start: 2022-08-31 | End: 2022-09-06 | Stop reason: SDUPTHER

## 2022-08-31 NOTE — PATIENT INSTRUCTIONS
Begin using the prescribed cough suppressant to help with your cough.     You may also use Mucinex DM to help with cough and congestion, but do not take these medications together.     Your cough may last up to 2-3 weeks, but if you start to have worsening symptoms (e.g. shortness of breath, fever, chills, coughing up blood, etc.) you should return to urgent care. Go to the emergency department or call 911 for any severe symptoms.     Otherwise, follow up with your primary care doctor for further evaluation if symptoms persist.

## 2022-08-31 NOTE — PROGRESS NOTES
"Subjective:       Patient ID: Grey Vance Jr. is a 87 y.o. male.    Vitals:  height is 5' 6" (1.676 m) and weight is 104.3 kg (230 lb). His temperature is 99.1 °F (37.3 °C). His blood pressure is 143/77 (abnormal) and his pulse is 83. His respiration is 18 and oxygen saturation is 96%.     Chief Complaint: Cough    Pt states a cough that began within the last few days. Pt reports it was bad last night. No known sick contacts. No hx of lung disease.     Cough  This is a new problem. The current episode started in the past 7 days. The problem has been gradually improving. The problem occurs hourly. The cough is Productive of sputum. Pertinent negatives include no chills, ear pain, fever, postnasal drip, sore throat, shortness of breath or wheezing. He has tried nothing for the symptoms.     Constitution: Negative for chills and fever.   HENT:  Negative for ear pain, congestion, postnasal drip, sinus pain, sinus pressure and sore throat.    Respiratory:  Positive for cough. Negative for chest tightness, sputum production, shortness of breath, wheezing and asthma.    Allergic/Immunologic: Negative for asthma.     Objective:      Physical Exam   Constitutional: He is oriented to person, place, and time. He does not appear ill. No distress.   HENT:   Head: Normocephalic.   Eyes: Conjunctivae are normal.   Cardiovascular: Normal rate and regular rhythm.   Pulmonary/Chest: Effort normal and breath sounds normal. No respiratory distress. He has no wheezes. He has no rhonchi. He has no rales.         Comments: Coughing fits     Neurological: He is alert and oriented to person, place, and time. Coordination normal.   Skin: Skin is warm and dry.   Psychiatric: His behavior is normal. Mood normal.   Nursing note and vitals reviewed.      Assessment:       1. Viral respiratory infection        Results for orders placed or performed in visit on 08/31/22   POCT COVID-19 Rapid Screening   Result Value Ref Range    POC Rapid " COVID Negative Negative     Acceptable Yes      EXAMINATION:  XR CHEST PA AND LATERAL     CLINICAL HISTORY:  Cough, unspecified     FINDINGS:  Chest two views: Heart size is normal.  There is aortic plaque.  The lungs are clear.  The bones showed DJD.     Impression:     No acute process seen.        Electronically signed by: Anton Davis MD  Date:                                            08/31/2022  Time:                                           10:58  Plan:         Viral respiratory infection  -     POCT COVID-19 Rapid Screening  -     XR CHEST PA AND LATERAL; Future; Expected date: 08/31/2022  -     benzonatate (TESSALON) 100 MG capsule; Take 2 capsules (200 mg total) by mouth 3 (three) times daily as needed for Cough.  Dispense: 40 capsule; Refill: 0       COVID negative, CXR reviewed with the patient. Diagnosis and plan discussed with the patient as well as the expected course and duration of his  symptoms.  Use prescription and/or OTC medications as directed. He was advised to follow up with his PCP. Ok to return to [facility] sooner if needed. ED precautions were given. All questions and concerns were addressed prior to discharge. Patient verbalized understanding and was agreeable with the plan of care.

## 2022-09-01 ENCOUNTER — TELEPHONE (OUTPATIENT)
Dept: INTERNAL MEDICINE | Facility: CLINIC | Age: 87
End: 2022-09-01
Payer: MEDICARE

## 2022-09-01 ENCOUNTER — HOSPITAL ENCOUNTER (EMERGENCY)
Facility: HOSPITAL | Age: 87
Discharge: LEFT BEFORE DISCHARGE | End: 2022-09-01
Attending: EMERGENCY MEDICINE
Payer: MEDICARE

## 2022-09-01 VITALS
WEIGHT: 250 LBS | OXYGEN SATURATION: 98 % | HEART RATE: 77 BPM | DIASTOLIC BLOOD PRESSURE: 59 MMHG | TEMPERATURE: 99 F | BODY MASS INDEX: 40.35 KG/M2 | SYSTOLIC BLOOD PRESSURE: 128 MMHG | RESPIRATION RATE: 16 BRPM

## 2022-09-01 DIAGNOSIS — R05.9 COUGH: ICD-10-CM

## 2022-09-01 DIAGNOSIS — R53.1 WEAKNESS: ICD-10-CM

## 2022-09-01 DIAGNOSIS — R79.89 TROPONIN I ABOVE REFERENCE RANGE: ICD-10-CM

## 2022-09-01 DIAGNOSIS — J10.1 INFLUENZA A: Primary | ICD-10-CM

## 2022-09-01 DIAGNOSIS — M62.82 NON-TRAUMATIC RHABDOMYOLYSIS: ICD-10-CM

## 2022-09-01 DIAGNOSIS — R50.9 FEVER, UNSPECIFIED FEVER CAUSE: ICD-10-CM

## 2022-09-01 DIAGNOSIS — E86.0 DEHYDRATION: ICD-10-CM

## 2022-09-01 LAB
ALBUMIN SERPL BCP-MCNC: 3.8 G/DL (ref 3.5–5.2)
ALP SERPL-CCNC: 65 U/L (ref 55–135)
ALT SERPL W/O P-5'-P-CCNC: 63 U/L (ref 10–44)
ANION GAP SERPL CALC-SCNC: 13 MMOL/L (ref 8–16)
AST SERPL-CCNC: 207 U/L (ref 10–40)
BACTERIA #/AREA URNS HPF: ABNORMAL /HPF
BASOPHILS # BLD AUTO: 0.04 K/UL (ref 0–0.2)
BASOPHILS NFR BLD: 0.4 % (ref 0–1.9)
BILIRUB SERPL-MCNC: 1.1 MG/DL (ref 0.1–1)
BILIRUB UR QL STRIP: NEGATIVE
BNP SERPL-MCNC: 112 PG/ML (ref 0–99)
BUN SERPL-MCNC: 25 MG/DL (ref 8–23)
CALCIUM SERPL-MCNC: 9.5 MG/DL (ref 8.7–10.5)
CHLORIDE SERPL-SCNC: 102 MMOL/L (ref 95–110)
CK SERPL-CCNC: ABNORMAL U/L (ref 20–200)
CLARITY UR: ABNORMAL
CO2 SERPL-SCNC: 20 MMOL/L (ref 23–29)
COLOR UR: YELLOW
CREAT SERPL-MCNC: 1.4 MG/DL (ref 0.5–1.4)
DIFFERENTIAL METHOD: ABNORMAL
EOSINOPHIL # BLD AUTO: 0 K/UL (ref 0–0.5)
EOSINOPHIL NFR BLD: 0 % (ref 0–8)
ERYTHROCYTE [DISTWIDTH] IN BLOOD BY AUTOMATED COUNT: 13.3 % (ref 11.5–14.5)
EST. GFR  (NO RACE VARIABLE): 49 ML/MIN/1.73 M^2
GLUCOSE SERPL-MCNC: 95 MG/DL (ref 70–110)
GLUCOSE UR QL STRIP: NEGATIVE
HCT VFR BLD AUTO: 43.2 % (ref 40–54)
HGB BLD-MCNC: 15.1 G/DL (ref 14–18)
HGB UR QL STRIP: ABNORMAL
HYALINE CASTS #/AREA URNS LPF: 0 /LPF
IMM GRANULOCYTES # BLD AUTO: 0.03 K/UL (ref 0–0.04)
IMM GRANULOCYTES NFR BLD AUTO: 0.3 % (ref 0–0.5)
INFLUENZA A, MOLECULAR: POSITIVE
INFLUENZA B, MOLECULAR: NEGATIVE
KETONES UR QL STRIP: ABNORMAL
LEUKOCYTE ESTERASE UR QL STRIP: NEGATIVE
LIPASE SERPL-CCNC: 18 U/L (ref 4–60)
LYMPHOCYTES # BLD AUTO: 1.5 K/UL (ref 1–4.8)
LYMPHOCYTES NFR BLD: 13.8 % (ref 18–48)
MAGNESIUM SERPL-MCNC: 2.3 MG/DL (ref 1.6–2.6)
MCH RBC QN AUTO: 31.2 PG (ref 27–31)
MCHC RBC AUTO-ENTMCNC: 35 G/DL (ref 32–36)
MCV RBC AUTO: 89 FL (ref 82–98)
MICROSCOPIC COMMENT: ABNORMAL
MONOCYTES # BLD AUTO: 0.9 K/UL (ref 0.3–1)
MONOCYTES NFR BLD: 8.1 % (ref 4–15)
NEUTROPHILS # BLD AUTO: 8.2 K/UL (ref 1.8–7.7)
NEUTROPHILS NFR BLD: 77.4 % (ref 38–73)
NITRITE UR QL STRIP: NEGATIVE
NRBC BLD-RTO: 0 /100 WBC
PH UR STRIP: 6 [PH] (ref 5–8)
PLATELET # BLD AUTO: 265 K/UL (ref 150–450)
PMV BLD AUTO: 9.5 FL (ref 9.2–12.9)
POTASSIUM SERPL-SCNC: 4.3 MMOL/L (ref 3.5–5.1)
PROT SERPL-MCNC: 7.5 G/DL (ref 6–8.4)
PROT UR QL STRIP: ABNORMAL
RBC # BLD AUTO: 4.84 M/UL (ref 4.6–6.2)
RBC #/AREA URNS HPF: 1 /HPF (ref 0–4)
SODIUM SERPL-SCNC: 135 MMOL/L (ref 136–145)
SP GR UR STRIP: >1.03 (ref 1–1.03)
SPECIMEN SOURCE: ABNORMAL
SQUAMOUS #/AREA URNS HPF: 0 /HPF
TROPONIN I SERPL DL<=0.01 NG/ML-MCNC: 0.97 NG/ML (ref 0–0.03)
URN SPEC COLLECT METH UR: ABNORMAL
UROBILINOGEN UR STRIP-ACNC: NEGATIVE EU/DL
WBC # BLD AUTO: 10.52 K/UL (ref 3.9–12.7)
WBC #/AREA URNS HPF: 9 /HPF (ref 0–5)
WBC CASTS #/AREA URNS LPF: 3 /LPF

## 2022-09-01 PROCEDURE — 83690 ASSAY OF LIPASE: CPT | Performed by: EMERGENCY MEDICINE

## 2022-09-01 PROCEDURE — 84484 ASSAY OF TROPONIN QUANT: CPT | Performed by: EMERGENCY MEDICINE

## 2022-09-01 PROCEDURE — 93005 ELECTROCARDIOGRAM TRACING: CPT

## 2022-09-01 PROCEDURE — 80053 COMPREHEN METABOLIC PANEL: CPT | Performed by: EMERGENCY MEDICINE

## 2022-09-01 PROCEDURE — 87502 INFLUENZA DNA AMP PROBE: CPT

## 2022-09-01 PROCEDURE — 87502 INFLUENZA DNA AMP PROBE: CPT | Performed by: EMERGENCY MEDICINE

## 2022-09-01 PROCEDURE — 96360 HYDRATION IV INFUSION INIT: CPT

## 2022-09-01 PROCEDURE — 83880 ASSAY OF NATRIURETIC PEPTIDE: CPT | Performed by: EMERGENCY MEDICINE

## 2022-09-01 PROCEDURE — 82550 ASSAY OF CK (CPK): CPT | Performed by: EMERGENCY MEDICINE

## 2022-09-01 PROCEDURE — 93010 EKG 12-LEAD: ICD-10-PCS | Mod: ,,, | Performed by: INTERNAL MEDICINE

## 2022-09-01 PROCEDURE — 25000003 PHARM REV CODE 250: Performed by: EMERGENCY MEDICINE

## 2022-09-01 PROCEDURE — 93010 ELECTROCARDIOGRAM REPORT: CPT | Mod: ,,, | Performed by: INTERNAL MEDICINE

## 2022-09-01 PROCEDURE — 83735 ASSAY OF MAGNESIUM: CPT | Performed by: EMERGENCY MEDICINE

## 2022-09-01 PROCEDURE — 81000 URINALYSIS NONAUTO W/SCOPE: CPT | Performed by: EMERGENCY MEDICINE

## 2022-09-01 PROCEDURE — 85025 COMPLETE CBC W/AUTO DIFF WBC: CPT | Performed by: EMERGENCY MEDICINE

## 2022-09-01 PROCEDURE — 99285 EMERGENCY DEPT VISIT HI MDM: CPT | Mod: 25

## 2022-09-01 RX ORDER — ACETAMINOPHEN 500 MG
1000 TABLET ORAL
Status: COMPLETED | OUTPATIENT
Start: 2022-09-01 | End: 2022-09-01

## 2022-09-01 RX ORDER — OSELTAMIVIR PHOSPHATE 75 MG/1
75 CAPSULE ORAL
Status: COMPLETED | OUTPATIENT
Start: 2022-09-01 | End: 2022-09-01

## 2022-09-01 RX ADMIN — SODIUM CHLORIDE 500 ML: 0.9 INJECTION, SOLUTION INTRAVENOUS at 05:09

## 2022-09-01 RX ADMIN — OSELTAMIVIR PHOSPHATE 75 MG: 75 CAPSULE ORAL at 06:09

## 2022-09-01 RX ADMIN — ACETAMINOPHEN 1000 MG: 500 TABLET ORAL at 05:09

## 2022-09-01 NOTE — TELEPHONE ENCOUNTER
----- Message from Margo Miller sent at 9/1/2022 12:33 PM CDT -----  Contact: Grey elizondo [phone]  [email or web address]     Patient is calling for Medical Advice regarding:    How long has patient had these symptoms:    Pharmacy name and phone#:    Would like response via [place]:call back    Comments: Pt is requesting a call back from the nurse regarding an appt

## 2022-09-01 NOTE — ED TRIAGE NOTES
Pt arrives with the diagnosis of influenza. Pt is reporting gen weakness and loss of appetite x3 days. Denies nvd, pain

## 2022-09-02 NOTE — ED NOTES
MD is at the bedside discussing results and admission. The patient and his son verbalized understanding but are refusing to be admitted

## 2022-09-02 NOTE — ED PROVIDER NOTES
"Encounter Date: 2022       History     Chief Complaint   Patient presents with    Influenza     Pt went to urgent care earlier after having cough, congestion, body aches and weakness x a few days. Pt was diagnosed with flu but also had an abnormal urinalysis and was instructed to come to ER for further eval and bloodwork.      88 yo man that presents with his son for evaluation from urgent care as he was diagnosed with the flu, has had a cough persistently over the last 2 days, and says that they wanted him to get checked out prompting him to come.  His son notes that he wanted to make sure that he was not going to "drop dead in the next day or so" he has taken some Tylenol to help with this without any significant improvement in his symptoms.  He has also use Tessalon Perles which is offered very little this cough.  He has never anything like this in the past that he can recall, says that he generally feels pretty well except for the cough.    Review of patient's allergies indicates:   Allergen Reactions    Bee pollens      Past Medical History:   Diagnosis Date    BPH with elevated PSA     Cataract     CKD (chronic kidney disease) stage 3, GFR 30-59 ml/min     Fever blister     H/O impaired glucose tolerance 2014    Hyperlipidemia     Pre-diabetes     Primary osteoarthritis of left hip 11/3/2015     Past Surgical History:   Procedure Laterality Date    THROAT SURGERY  20years    TONSILLECTOMY      VASECTOMY       Family History   Problem Relation Age of Onset    Melanoma Neg Hx      Social History     Tobacco Use    Smoking status: Former     Types: Cigarettes     Quit date: 3/8/1969     Years since quittin.5    Smokeless tobacco: Never   Substance Use Topics    Alcohol use: No     Alcohol/week: 0.0 standard drinks     Comment: none since age 52    Drug use: No     Review of Systems  Constitutional-+ fever  HEENT-+ congestion. + cough  Eyes-no redness  Respiratory-no shortness of breath  Cardio-no " chest pain  GI-no abdominal pain  Endocrine-no cold intolerance  -no difficulty urinating  MSK-no myalgias  Skin-no rashes  Allergy-no environmental allergy  Neurologic-, no headache  Hematology-no swollen nodes  Behavioral-no confusion   Physical Exam     Initial Vitals [09/01/22 1528]   BP Pulse Resp Temp SpO2   (!) 113/51 83 18 (!) 100.9 °F (38.3 °C) 96 %      MAP       --         Physical Exam  Constitutional : Well appearing, no distress.  Eyes: Conjunctivae normal.  ENT       Head: Normocephalic, atraumatic.       Nose: Normal external appearance        Mouth/Throat: no strigulous respirations   Hematological/Lymphatic/Immunilogical: no visible lymphadenopathy   Cardiovascular: Normal rate,   Respiratory: Normal respiratory effort.   Gastrointestinal: non distended   Musculoskeletal: Normal range of motion in all extremities.  Plus one edema in the bilateral lower extremity  Neurologic: Alert, oriented. Normal speech and language. No gross focal neurologic deficits are appreciated.  Skin: Skin is warm, dry. No rash noted.  Psychiatric: Mood and affect are normal.   ED Course   Procedures  Labs Reviewed   INFLUENZA A & B BY MOLECULAR - Abnormal; Notable for the following components:       Result Value    Influenza A, Molecular Positive (*)     All other components within normal limits   URINALYSIS, REFLEX TO URINE CULTURE - Abnormal; Notable for the following components:    Appearance, UA Hazy (*)     Specific Gravity, UA >1.030 (*)     Protein, UA 2+ (*)     Ketones, UA 1+ (*)     Occult Blood UA 3+ (*)     All other components within normal limits    Narrative:     Specimen Source->Urine   CBC W/ AUTO DIFFERENTIAL - Abnormal; Notable for the following components:    MCH 31.2 (*)     Gran # (ANC) 8.2 (*)     Gran % 77.4 (*)     Lymph % 13.8 (*)     All other components within normal limits   COMPREHENSIVE METABOLIC PANEL - Abnormal; Notable for the following components:    Sodium 135 (*)     CO2 20 (*)      BUN 25 (*)     Total Bilirubin 1.1 (*)      (*)     ALT 63 (*)     eGFR 49 (*)     All other components within normal limits   TROPONIN I - Abnormal; Notable for the following components:    Troponin I 0.970 (*)     All other components within normal limits   B-TYPE NATRIURETIC PEPTIDE - Abnormal; Notable for the following components:     (*)     All other components within normal limits   CK - Abnormal; Notable for the following components:    CPK 62495 (*)     All other components within normal limits   URINALYSIS MICROSCOPIC - Abnormal; Notable for the following components:    WBC, UA 9 (*)     WBC Casts, UA 3 (*)     All other components within normal limits    Narrative:     Specimen Source->Urine   LIPASE   MAGNESIUM          Imaging Results              X-Ray Chest AP Portable (Final result)  Result time 09/01/22 18:18:21      Final result by Steve Swartz MD (09/01/22 18:18:21)                   Impression:      1. Interstitial findings may reflect chronic change versus nonspecific edema or other pneumonitis.  No large focal consolidation.      Electronically signed by: Steve Swartz MD  Date:    09/01/2022  Time:    18:18               Narrative:    EXAMINATION:  XR CHEST AP PORTABLE    CLINICAL HISTORY:  Cough, unspecified    TECHNIQUE:  Single frontal view of the chest was performed.    COMPARISON:  08/31/2022    FINDINGS:  The cardiomediastinal silhouette is not enlarged noting calcification of the aorta..  There is no pleural effusion.  The trachea is midline.  The lungs are symmetrically expanded bilaterally with coarse interstitial attenuation bilaterally noting scattered regions of bandlike atelectasis or scarring, similar in appearance to the previous examination.  There is left basilar subsegmental atelectasis..  No large focal consolidation seen.  There is no pneumothorax.  The osseous structures are remarkable for degenerative changes..                                        Medications   acetaminophen tablet 1,000 mg (1,000 mg Oral Given 9/1/22 1723)   sodium chloride 0.9% bolus 500 mL (0 mLs Intravenous Stopped 9/1/22 1840)   oseltamivir capsule 75 mg (75 mg Oral Given 9/1/22 1841)     Medical Decision Making:   History:   Old Medical Records: I decided to obtain old medical records.  Old Records Summarized: records from clinic visits and records from previous admission(s).  Differential Diagnosis:   Influenza, myocarditis, heart failure, sepsis, dehydration, rhabdomyelisis  Clinical Tests:   Lab Tests: Ordered and Reviewed  Radiological Study: Ordered and Reviewed  ED Management:  This gentleman presented for evaluation of fever, and body aches in the setting of a positive flu test.    Stable on room air upon arrival, however get this initiated evaluation which demonstrated elevated troponin, some pulmonary congestion, upper CPK concerning for developing rhabdomyolysis and potential significant kidney injury.    Cardiac monitoring demonstrated a sinus rhythm with a normal blood pressure.  I discussed with this gentleman as well as his son for an extensive period of time they benefits of hospitalization and the need for hospitalization for closer monitoring, administration of IV fluids, reassessment.  Despite multiple attempts this gentleman in son say that they will go home and decided to come back in case of any worsening symptoms.  I discussed the potential serious adverse health out so if this patient were to go home including death, kidney failure, permanent disability, despite my concerns stated that they wanted to go to the House any way.  Have proceeded with an AMA significant concerns for this gentleman's well-being at this time and my disagreement with this decision making however he does have capacity and does have an adult son at the bedside who is in agreement with this plan.                    Clinical Impression:   Final diagnoses:  [R05.9] Cough  [R77.8] Troponin I  above reference range  [J10.1] Influenza A (Primary)  [R50.9] Fever, unspecified fever cause  [M62.82] Non-traumatic rhabdomyolysis  [R53.1] Weakness  [E86.0] Dehydration        ED Disposition Condition    PEARL Franco MD  09/02/22 0007

## 2022-09-02 NOTE — DISCHARGE INSTRUCTIONS
Please return immediately to the emergency department if you have any worsening symptoms, please drink plenty of fluids, pain in the chest, shortness of breath, you stop breathing, have cramps, notice your urine is darker or less than usual return to the hospital immediately.      Mr. Vance,    Thank you for letting me care for you today! It was nice meeting you, and I hope you feel better soon.   If you would like access to your chart and what was done today please utilize the [facility].   Please come back to [facility] for all of your future medical needs.    Our goal in the emergency department is to always give you outstanding care and exceptional service. You may receive a survey by mail or e-mail in the next week regarding your experience in our ED. We would greatly appreciate you completing and returning the survey. Your feedback provides us with a way to recognize our staff who give very good care and it helps us learn how to improve when your experience was below our aspiration of excellence.     Sincerely,    Itz Franco MD  Board Certified Emergency Physician

## 2022-09-05 NOTE — PROGRESS NOTES
"INTERNAL MEDICINE CLINIC - SAME DAY APPOINTMENT  Progress Note    PRESENTING HISTORY     PCP: Maryjane Adkins MD    Chief Complaint/Reason for Visit:   No chief complaint on file.     History of Present Illness & ROS : Mr. Grey Vance Jr. is a 87 y.o. male.    *Late for appt due to reported issues at check in desk; was able to still accommodate for appt that began at 0822 for 0800 appt.   Very pleasant gentleman.   ER follow up, proceeding AMA sign out on 9/1/2022.   States, "I feel better than I did when I went to the hospital'. No cough, SOB, chest discomforts, dizziness, headaches, or changes in vision. Does not endorse fever, chills or body aches.   He drove to appt today. Was not certain 'why had to be seen today'. Son is in '[place]".   Discussed, at length, the results from recent ER visit, which he reports, 'had no idea of all that; trying to drink more water; still taking cholesterol pill'. No recurrent falls, but did report a single fall shortly before the ER visit while he was out a friend.     Review of Systems:   Eyes: denies visual changes at this time denies floaters   ENT: no nasal congestion or sore throat  Respiratory: no cough or shorness of breath  Cardiovascular: no chest pain or palpitations  Gastrointestinal: no nausea or vomiting, no abdominal pain or change in bowel habits  Genitourinary: no hematuria or dysuria; denies frequency  Hematologic/Lymphatic: no easy bruising or lymphadenopathy  Musculoskeletal: no arthralgias or myalgias  Neurological: no seizures or tremors  Endocrine: no heat or cold intolerance      PAST HISTORY:     Past Medical History:   Diagnosis Date    BPH with elevated PSA     Cataract     CKD (chronic kidney disease) stage 3, GFR 30-59 ml/min     Fever blister     H/O impaired glucose tolerance 4/2/2014    Hyperlipidemia     Pre-diabetes     Primary osteoarthritis of left hip 11/3/2015       Past Surgical History:   Procedure Laterality Date    THROAT SURGERY  " 20years    TONSILLECTOMY      VASECTOMY         Family History   Problem Relation Age of Onset    Melanoma Neg Hx        Social History     Socioeconomic History    Marital status:    Occupational History    Occupation:      Employer: RETIRED   Tobacco Use    Smoking status: Former     Types: Cigarettes     Quit date: 3/8/1969     Years since quittin.5    Smokeless tobacco: Never   Substance and Sexual Activity    Alcohol use: No     Alcohol/week: 0.0 standard drinks     Comment: none since age 52    Drug use: No    Sexual activity: Yes     Partners: Female       MEDICATIONS & ALLERGIES:     Current Outpatient Medications on File Prior to Visit   Medication Sig Dispense Refill    aspirin (ECOTRIN) 81 MG EC tablet Take 81 mg by mouth once daily.      benzonatate (TESSALON) 100 MG capsule Take 2 capsules (200 mg total) by mouth 3 (three) times daily as needed for Cough. 40 capsule 0    brimonidine 0.15 % OPTH DROP (ALPHAGAN) 0.15 % ophthalmic solution PLACE 1 GTT AUGUSTO BID      CALCIUM CITRATE/VITAMIN D2 (CALCIUM CITRATE WITH D ORAL) Take by mouth once daily.      CHOLECALCIFEROL, VITAMIN D3, (VITAMIN D3 ORAL) Take 10,000 Units by mouth once a week. Take one weekly      folic acid (FOLVITE) 1 MG tablet Take 1 mg by mouth once daily.      GLUCOSAM SUL NA/CHONDR DWYER A NA (GLUCOSAMINE-CHONDROITIN 3X ORAL) Take 2 tablets by mouth once daily.      LUMIGAN 0.01 % Drop INT 1 GTT IN OS HS      multivitamin (THERAGRAN) per tablet Take 1 tablet by mouth once daily.      prednisoLONE acetate (PRED FORTE) 1 % DrpS SHAKE LIQUID AND INSTILL 1 DROP IN LEFT EYE FOUR TIMES DAILY      simvastatin (ZOCOR) 40 MG tablet TAKE 1/2 TABLET(20 MG) BY MOUTH EVERY EVENING 45 tablet 4    tamsulosin (FLOMAX) 0.4 mg Cap TAKE 1 CAPSULE(0.4 MG) BY MOUTH EVERY EVENING 90 capsule 3     No current facility-administered medications on file prior to visit.        Review of patient's allergies indicates:   Allergen Reactions    Bee  pollens        Medications Reconciliation:   I have reconciled the patient's home medications with the patient/family. I have updated all changes.  Refer to After-Visit Medication List.    OBJECTIVE:     Vital Signs:  There were no vitals filed for this visit.  Wt Readings from Last 3 Encounters:   09/01/22 1528 113.4 kg (250 lb)   08/31/22 1011 104.3 kg (230 lb)   07/20/22 1304 114.9 kg (253 lb 4.9 oz)     There is no height or weight on file to calculate BMI.   Wt Readings from Last 3 Encounters:   09/06/22 97.9 kg (215 lb 13.3 oz)   09/01/22 113.4 kg (250 lb)   08/31/22 104.3 kg (230 lb)     Temp Readings from Last 3 Encounters:   09/01/22 98.8 °F (37.1 °C) (Oral)   08/31/22 99.1 °F (37.3 °C)   03/28/22 98.1 °F (36.7 °C) (Oral)     BP Readings from Last 3 Encounters:   09/06/22 128/64   09/01/22 (!) 128/59   08/31/22 (!) 143/77     Pulse Readings from Last 3 Encounters:   09/06/22 72   09/01/22 77   08/31/22 83       Physical Exam:  General: Well developed, well nourished. No distress.  HEENT: Head is normocephalic, atraumatic; ears are normal.   Eyes: Clear conjunctiva.  Neck: Supple, symmetrical neck; trachea midline.  No JVD  Lungs:  diminished to RLL, otherwise clear to auscultation bilaterally and normal respiratory effort.  Cardiovascular: Heart with regular rate and rhythm. No murmurs, gallops or rubs  Extremities:+1 BLE edema. Pulses 2+ and symmetric.   Abdomen: Abdomen is soft, non-tender non-distended with normal bowel sounds.  Skin: Skin color, texture, turgor normal. No rashes.  Musculoskeletal: Normal gait with cane  Lymph Nodes: No cervical or supraclavicular adenopathy.  Neurologic: No focal numbness or weakness.   Psychiatric: Not depressed.      Laboratory  Lab Results   Component Value Date    WBC 10.52 09/01/2022    HGB 15.1 09/01/2022    HCT 43.2 09/01/2022     09/01/2022    CHOL 136 10/11/2021    TRIG 69 10/11/2021    HDL 42 10/11/2021    ALT 63 (H) 09/01/2022     (H) 09/01/2022  "    (L) 09/01/2022    K 4.3 09/01/2022     09/01/2022    CREATININE 1.4 09/01/2022    BUN 25 (H) 09/01/2022    CO2 20 (L) 09/01/2022    TSH 1.717 03/19/2020    PSA 1.1 06/06/2018    HGBA1C 6.0 (H) 04/05/2022      CXR  FINDINGS:  The cardiomediastinal silhouette is not enlarged noting calcification of the aorta..  There is no pleural effusion.  The trachea is midline.  The lungs are symmetrically expanded bilaterally with coarse interstitial attenuation bilaterally noting scattered regions of bandlike atelectasis or scarring, similar in appearance to the previous examination.  There is left basilar subsegmental atelectasis..  No large focal consolidation seen.  There is no pneumothorax.  The osseous structures are remarkable for degenerative changes..     Impression:     1. Interstitial findings may reflect chronic change versus nonspecific edema or other pneumonitis.  No large focal consolidation.     Electronically signed by: Steve Swartz MD  Date:                                            09/01/2022  Time:                                           18:18      ASSESSMENT & PLAN:     Persistent Cough, symptom of presentation to the ER on 9/1/2022 has resolved:   ER on 9/1 for Influenza A  He signed out against AMA and presents today with persistent 'cough". ER notations made to CPK of 13,466, Trop elevation of 0.9, elevated LFTs (207/63) and BNP of 112. Advisement on 9/1/2022 was 'admission', refused and signed AMA form    Follow up today:   Influenza A, Elevated CPK (Rhabdo / Cardiac?), BNP elevation, Transaminase, Elevated Troponin and Abnormal CXR:   Fall  preceding   *Suspect Cough was likely in the setting of several factors   Possible PNA, ? Cardiac / Pleural Effusion, and most concerning is the Trop and CPK that was grossly elevated on 9/1, causing an acute impact to 'heart' resulting in fluid vol overload and hepato-congestion given acute elevations noted on ER labs from 9/1..  Sating today: " 97% RA   BP: 128/64  HR: 72  Wt: 215   PLAN TODAY:   -     X-Ray Chest PA And Lateral; Future; Expected date: 09/06/2022  -     Comprehensive Metabolic Panel; Future; Expected date: 09/06/2022  -     CBC Auto Differential; Future; Expected date: 09/06/2022  -     CK; Future; Expected date: 09/06/2022  -     B-TYPE NATRIURETIC PEPTIDE; Future; Expected date: 09/06/2022  -     TROPONIN I; Future; Expected date: 09/06/2022    Traumatic rhabdomyolysis, initial encounter  -     X-Ray Chest PA And Lateral; Future; Expected date: 09/06/2022  -     Comprehensive Metabolic Panel; Future; Expected date: 09/06/2022  -     CBC Auto Differential; Future; Expected date: 09/06/2022  -     CK; Future; Expected date: 09/06/2022  -     B-TYPE NATRIURETIC PEPTIDE; Future; Expected date: 09/06/2022  -     TROPONIN I; Future; Expected date: 09/06/2022    Elevated brain natriuretic peptide (BNP) level  -     X-Ray Chest PA And Lateral; Future; Expected date: 09/06/2022  -     Comprehensive Metabolic Panel; Future; Expected date: 09/06/2022  -     CBC Auto Differential; Future; Expected date: 09/06/2022  -     CK; Future; Expected date: 09/06/2022  -     B-TYPE NATRIURETIC PEPTIDE; Future; Expected date: 09/06/2022  -     TROPONIN I; Future; Expected date: 09/06/2022    Abnormal transaminases  -     X-Ray Chest PA And Lateral; Future; Expected date: 09/06/2022  -     Comprehensive Metabolic Panel; Future; Expected date: 09/06/2022  -     CBC Auto Differential; Future; Expected date: 09/06/2022  -     CK; Future; Expected date: 09/06/2022  -     B-TYPE NATRIURETIC PEPTIDE; Future; Expected date: 09/06/2022  -     TROPONIN I; Future; Expected date: 09/06/2022    Abnormal CXR  -     X-Ray Chest PA And Lateral; Future; Expected date: 09/06/2022  -     Comprehensive Metabolic Panel; Future; Expected date: 09/06/2022  -     CBC Auto Differential; Future; Expected date: 09/06/2022  -     CK; Future; Expected date: 09/06/2022  -     B-TYPE  NATRIURETIC PEPTIDE; Future; Expected date: 09/06/2022  -     TROPONIN I; Future; Expected date: 09/06/2022    Elevated CPK  -     X-Ray Chest PA And Lateral; Future; Expected date: 09/06/2022  -     Comprehensive Metabolic Panel; Future; Expected date: 09/06/2022  -     CBC Auto Differential; Future; Expected date: 09/06/2022  -     CK; Future; Expected date: 09/06/2022  -     B-TYPE NATRIURETIC PEPTIDE; Future; Expected date: 09/06/2022  -     TROPONIN I; Future; Expected date: 09/06/2022    Elevated troponin I level  -     X-Ray Chest PA And Lateral; Future; Expected date: 09/06/2022  -     Comprehensive Metabolic Panel; Future; Expected date: 09/06/2022  -     CBC Auto Differential; Future; Expected date: 09/06/2022  -     CK; Future; Expected date: 09/06/2022  -     B-TYPE NATRIURETIC PEPTIDE; Future; Expected date: 09/06/2022  -     TROPONIN I; Future; Expected date: 09/06/2022    Viral respiratory infection  -     benzonatate (TESSALON) 100 MG capsule; Take 2 capsules (200 mg total) by mouth 3 (three) times daily as needed for Cough.  Dispense: 40 capsule; Refill: 0    ` continue Tessalon perles      *Repeat labs now, IF WITH NO significant changes), and after long conversation had with patient, he  understands that if labs remain concerning, advisement will be return to the ER for more emergent medical care that is unable to be safely managed on the outside of acute care setting; he is elderly and concern for possible Cardio pulmonary cause that warrants urgent, ongoing medical treatment and care at this time. He was in agreement with the plan and understands that with onset of SOB or chest pain, must go to the ER.   Future Appointments   Date Time Provider Department Center   9/6/2022  9:20 AM LAB, APPOINTMENT [facility]   10/5/2022  1:30 PM Maryjane Adkins MD [facility]        Medication List            Accurate as of September 6, 2022  9:00 AM. If you have any  questions, ask your nurse or doctor.                CONTINUE taking these medications      aspirin 81 MG EC tablet  Commonly known as: ECOTRIN     benzonatate 100 MG capsule  Commonly known as: TESSALON  Take 2 capsules (200 mg total) by mouth 3 (three) times daily as needed for Cough.     brimonidine 0.15 % OPTH DROP 0.15 % ophthalmic solution  Commonly known as: ALPHAGAN     CALCIUM CITRATE WITH D ORAL     folic acid 1 MG tablet  Commonly known as: FOLVITE     GLUCOSAMINE-CHONDROITIN 3X ORAL     LUMIGAN 0.01 % Drop  Generic drug: bimatoprost     multivitamin per tablet  Commonly known as: THERAGRAN     prednisoLONE acetate 1 % Drps  Commonly known as: PRED FORTE     simvastatin 40 MG tablet  Commonly known as: ZOCOR  TAKE 1/2 TABLET(20 MG) BY MOUTH EVERY EVENING     tamsulosin 0.4 mg Cap  Commonly known as: FLOMAX  TAKE 1 CAPSULE(0.4 MG) BY MOUTH EVERY EVENING     VITAMIN D3 ORAL               Where to Get Your Medications        These medications were sent to [id number] - [place] - [address]  AT [facility] [address]  [address] [place] [id number]      Hours: 24-hours Phone: [phone]   benzonatate 100 MG capsule         Signing Physician:  FE Faria

## 2022-09-06 ENCOUNTER — TELEPHONE (OUTPATIENT)
Dept: INTERNAL MEDICINE | Facility: CLINIC | Age: 87
End: 2022-09-06

## 2022-09-06 ENCOUNTER — OFFICE VISIT (OUTPATIENT)
Dept: INTERNAL MEDICINE | Facility: CLINIC | Age: 87
End: 2022-09-06
Payer: MEDICARE

## 2022-09-06 ENCOUNTER — HOSPITAL ENCOUNTER (OUTPATIENT)
Dept: RADIOLOGY | Facility: HOSPITAL | Age: 87
Discharge: HOME | End: 2022-09-06
Attending: NURSE PRACTITIONER
Payer: MEDICARE

## 2022-09-06 VITALS
HEIGHT: 66 IN | HEART RATE: 72 BPM | SYSTOLIC BLOOD PRESSURE: 128 MMHG | WEIGHT: 215.81 LBS | BODY MASS INDEX: 34.68 KG/M2 | OXYGEN SATURATION: 97 % | DIASTOLIC BLOOD PRESSURE: 64 MMHG

## 2022-09-06 DIAGNOSIS — R93.89 ABNORMAL CXR: ICD-10-CM

## 2022-09-06 DIAGNOSIS — B97.89 VIRAL RESPIRATORY INFECTION: ICD-10-CM

## 2022-09-06 DIAGNOSIS — R79.89 ELEVATED TROPONIN I LEVEL: ICD-10-CM

## 2022-09-06 DIAGNOSIS — Z09 FOLLOW UP: ICD-10-CM

## 2022-09-06 DIAGNOSIS — R79.89 ELEVATED BRAIN NATRIURETIC PEPTIDE (BNP) LEVEL: ICD-10-CM

## 2022-09-06 DIAGNOSIS — T79.6XXA TRAUMATIC RHABDOMYOLYSIS, INITIAL ENCOUNTER: ICD-10-CM

## 2022-09-06 DIAGNOSIS — Z09 FOLLOW UP: Primary | ICD-10-CM

## 2022-09-06 DIAGNOSIS — R74.8 ABNORMAL TRANSAMINASES: ICD-10-CM

## 2022-09-06 DIAGNOSIS — R74.8 ELEVATED CPK: ICD-10-CM

## 2022-09-06 DIAGNOSIS — J98.8 VIRAL RESPIRATORY INFECTION: ICD-10-CM

## 2022-09-06 DIAGNOSIS — R79.89 ELEVATED TROPONIN I MEASUREMENT: Primary | ICD-10-CM

## 2022-09-06 PROCEDURE — 99999 PR PBB SHADOW E&M-EST. PATIENT-LVL IV: ICD-10-PCS | Mod: PBBFAC,,, | Performed by: NURSE PRACTITIONER

## 2022-09-06 PROCEDURE — 71046 XR CHEST PA AND LATERAL: ICD-10-PCS | Mod: 26,,, | Performed by: RADIOLOGY

## 2022-09-06 PROCEDURE — 99214 OFFICE O/P EST MOD 30 MIN: CPT | Mod: PBBFAC,25 | Performed by: NURSE PRACTITIONER

## 2022-09-06 PROCEDURE — 71046 X-RAY EXAM CHEST 2 VIEWS: CPT | Mod: 26,,, | Performed by: RADIOLOGY

## 2022-09-06 PROCEDURE — 71046 X-RAY EXAM CHEST 2 VIEWS: CPT | Mod: TC

## 2022-09-06 PROCEDURE — 99214 PR OFFICE/OUTPT VISIT, EST, LEVL IV, 30-39 MIN: ICD-10-PCS | Mod: S$PBB,,, | Performed by: NURSE PRACTITIONER

## 2022-09-06 PROCEDURE — 99999 PR PBB SHADOW E&M-EST. PATIENT-LVL IV: CPT | Mod: PBBFAC,,, | Performed by: NURSE PRACTITIONER

## 2022-09-06 PROCEDURE — 99214 OFFICE O/P EST MOD 30 MIN: CPT | Mod: S$PBB,,, | Performed by: NURSE PRACTITIONER

## 2022-09-06 RX ORDER — ALBUTEROL SULFATE 90 UG/1
2 AEROSOL, METERED RESPIRATORY (INHALATION) EVERY 4 HOURS PRN
Qty: 18 G | Refills: 0 | Status: CANCELLED | OUTPATIENT
Start: 2022-09-06

## 2022-09-06 RX ORDER — BENZONATATE 100 MG/1
200 CAPSULE ORAL 3 TIMES DAILY PRN
Qty: 40 CAPSULE | Refills: 0 | Status: SHIPPED | OUTPATIENT
Start: 2022-09-06 | End: 2022-09-21

## 2022-09-06 NOTE — TELEPHONE ENCOUNTER
Called and spoke with patient:   Cxr with some improvement  BNP: normalized (44 from 112)  Trop: trending down; however, although no symptoms and ER reports no ST changes, concern remains for possible ischemic event; check TTE and refer to Cardiology.   Electrolyte, liver and renal values are stable / improving.     Message has been shared with his primary in regards.     JUAN

## 2022-09-14 ENCOUNTER — TELEPHONE (OUTPATIENT)
Dept: INTERNAL MEDICINE | Facility: CLINIC | Age: 87
End: 2022-09-14
Payer: MEDICARE

## 2022-09-15 ENCOUNTER — HOSPITAL ENCOUNTER (OUTPATIENT)
Dept: CARDIOLOGY | Facility: HOSPITAL | Age: 87
Discharge: HOME | End: 2022-09-15
Attending: NURSE PRACTITIONER
Payer: MEDICARE

## 2022-09-15 VITALS
SYSTOLIC BLOOD PRESSURE: 128 MMHG | HEIGHT: 66 IN | WEIGHT: 215 LBS | BODY MASS INDEX: 34.55 KG/M2 | HEART RATE: 70 BPM | DIASTOLIC BLOOD PRESSURE: 64 MMHG

## 2022-09-15 DIAGNOSIS — R79.89 ELEVATED BRAIN NATRIURETIC PEPTIDE (BNP) LEVEL: ICD-10-CM

## 2022-09-15 DIAGNOSIS — R79.89 ELEVATED TROPONIN I MEASUREMENT: ICD-10-CM

## 2022-09-15 LAB
ASCENDING AORTA: 3.54 CM
AV MEAN GRADIENT: 12 MMHG
AV PEAK GRADIENT: 25 MMHG
BSA FOR ECHO PROCEDURE: 2.13 M2
CV ECHO LV RWT: 0.5 CM
DOP CALC AO PEAK VEL: 2.5 M/S
DOP CALC AO VTI: 52.22 CM
DOP CALC LVOT AREA: 3.4 CM2
DOP CALC LVOT DIAMETER: 2.08 CM
E WAVE DECELERATION TIME: 233.01 MSEC
E/A RATIO: 1.11
E/E' RATIO: 14.88 M/S
ECHO LV POSTERIOR WALL: 1.01 CM (ref 0.6–1.1)
EJECTION FRACTION: 60 %
FRACTIONAL SHORTENING: 34 % (ref 28–44)
INTERVENTRICULAR SEPTUM: 0.94 CM (ref 0.6–1.1)
IVRT: 71.36 MSEC
LA MAJOR: 5.99 CM
LA MINOR: 5.87 CM
LA WIDTH: 3.89 CM
LEFT ATRIUM SIZE: 3.62 CM
LEFT ATRIUM VOLUME INDEX MOD: 41.5 ML/M2
LEFT ATRIUM VOLUME INDEX: 34.5 ML/M2
LEFT ATRIUM VOLUME MOD: 85.39 CM3
LEFT ATRIUM VOLUME: 70.97 CM3
LEFT INTERNAL DIMENSION IN SYSTOLE: 2.67 CM (ref 2.1–4)
LEFT VENTRICLE DIASTOLIC VOLUME INDEX: 34.5 ML/M2
LEFT VENTRICLE DIASTOLIC VOLUME: 71.08 ML
LEFT VENTRICLE MASS INDEX: 60 G/M2
LEFT VENTRICLE SYSTOLIC VOLUME INDEX: 12.7 ML/M2
LEFT VENTRICLE SYSTOLIC VOLUME: 26.23 ML
LEFT VENTRICULAR INTERNAL DIMENSION IN DIASTOLE: 4.03 CM (ref 3.5–6)
LEFT VENTRICULAR MASS: 124.07 G
LV LATERAL E/E' RATIO: 13.22 M/S
LV SEPTAL E/E' RATIO: 17 M/S
MV A" WAVE DURATION": 20.27 MSEC
MV PEAK A VEL: 1.07 M/S
MV PEAK E VEL: 1.19 M/S
MV STENOSIS PRESSURE HALF TIME: 67.57 MS
MV VALVE AREA P 1/2 METHOD: 3.26 CM2
PISA TR MAX VEL: 2.01 M/S
PULM VEIN S/D RATIO: 1.18
PV PEAK D VEL: 0.39 M/S
PV PEAK S VEL: 0.46 M/S
RA MAJOR: 4.09 CM
RA PRESSURE: 3 MMHG
RA WIDTH: 3.32 CM
RIGHT VENTRICULAR END-DIASTOLIC DIMENSION: 3.93 CM
RV TISSUE DOPPLER FREE WALL SYSTOLIC VELOCITY 1 (APICAL 4 CHAMBER VIEW): 13.73 CM/S
SINUS: 3.62 CM
STJ: 2.8 CM
TDI LATERAL: 0.09 M/S
TDI SEPTAL: 0.07 M/S
TDI: 0.08 M/S
TR MAX PG: 16 MMHG
TRICUSPID ANNULAR PLANE SYSTOLIC EXCURSION: 2.59 CM
TV REST PULMONARY ARTERY PRESSURE: 19 MMHG

## 2022-09-15 PROCEDURE — 93306 TTE W/DOPPLER COMPLETE: CPT

## 2022-09-15 PROCEDURE — 93306 TTE W/DOPPLER COMPLETE: CPT | Mod: 26,,, | Performed by: INTERNAL MEDICINE

## 2022-09-15 PROCEDURE — 93306 ECHO (CUPID ONLY): ICD-10-PCS | Mod: 26,,, | Performed by: INTERNAL MEDICINE

## 2022-09-21 ENCOUNTER — OFFICE VISIT (OUTPATIENT)
Dept: CARDIOLOGY | Facility: CLINIC | Age: 87
End: 2022-09-21
Payer: MEDICARE

## 2022-09-21 VITALS
HEART RATE: 82 BPM | WEIGHT: 249.56 LBS | SYSTOLIC BLOOD PRESSURE: 122 MMHG | HEIGHT: 66 IN | DIASTOLIC BLOOD PRESSURE: 58 MMHG | OXYGEN SATURATION: 96 % | BODY MASS INDEX: 40.11 KG/M2

## 2022-09-21 DIAGNOSIS — E78.2 MIXED HYPERLIPIDEMIA: ICD-10-CM

## 2022-09-21 DIAGNOSIS — E66.01 MORBID OBESITY DUE TO EXCESS CALORIES: ICD-10-CM

## 2022-09-21 DIAGNOSIS — N18.30 STAGE 3 CHRONIC KIDNEY DISEASE, UNSPECIFIED WHETHER STAGE 3A OR 3B CKD: ICD-10-CM

## 2022-09-21 DIAGNOSIS — R79.89 ELEVATED BRAIN NATRIURETIC PEPTIDE (BNP) LEVEL: ICD-10-CM

## 2022-09-21 DIAGNOSIS — E66.9 OBESITY (BMI 30.0-34.9): ICD-10-CM

## 2022-09-21 DIAGNOSIS — R79.89 ELEVATED TROPONIN I MEASUREMENT: Primary | ICD-10-CM

## 2022-09-21 PROCEDURE — 99214 OFFICE O/P EST MOD 30 MIN: CPT | Mod: S$PBB,,, | Performed by: INTERNAL MEDICINE

## 2022-09-21 PROCEDURE — 99214 PR OFFICE/OUTPT VISIT, EST, LEVL IV, 30-39 MIN: ICD-10-PCS | Mod: S$PBB,,, | Performed by: INTERNAL MEDICINE

## 2022-09-21 PROCEDURE — 99999 PR PBB SHADOW E&M-EST. PATIENT-LVL V: CPT | Mod: PBBFAC,,, | Performed by: INTERNAL MEDICINE

## 2022-09-21 PROCEDURE — 99999 PR PBB SHADOW E&M-EST. PATIENT-LVL V: ICD-10-PCS | Mod: PBBFAC,,, | Performed by: INTERNAL MEDICINE

## 2022-09-21 PROCEDURE — 99215 OFFICE O/P EST HI 40 MIN: CPT | Mod: PBBFAC | Performed by: INTERNAL MEDICINE

## 2022-09-21 NOTE — PROGRESS NOTES
"Subjective:   Patient ID:  Grey Vance Jr. is a 87 y.o. male is a new patient who presents for evaluation of Establish Care, Follow-up, and Referral Authorization (High reading after having the flu.Pt states he has no symptoms.)      HPI:   Recent ER visit for cough and flu and went to the ER and was found to hae  Ex smoker. Quit 50 years ago.  No chest pain, Orthopnea, PND of heart failure symptoms.   Denies palpitations or fluttering in the chest  Denies TIRSO.   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     EKG: NSR    Echo 9/6/2022  The left ventricle is normal in size with concentric remodeling and normal systolic function. The estimated ejection fraction is 60%.  Normal right ventricular size with normal right ventricular systolic function.  Grade II left ventricular diastolic dysfunction.  The estimated PA systolic pressure is 19 mmHg.  Normal central venous pressure (3 mmHg).       Patient Active Problem List   Diagnosis    BPH (benign prostatic hypertrophy) with urinary obstruction    ED (erectile dysfunction) of organic origin    Mild vitamin D deficiency    Hyperlipidemia    Pre-diabetes    CKD (chronic kidney disease) stage 3, GFR 30-59 ml/min    Impingement syndrome of right shoulder    Primary osteoarthritis of left hip    Obesity (BMI 30.0-34.9)    BMI 39.0-39.9,adult    Morbid (severe) obesity due to excess calories    Decreased strength     BP (!) 122/58 (BP Location: Left arm, Patient Position: Sitting, BP Method: Large (Automatic))   Pulse 82   Ht 5' 6" (1.676 m)   Wt 113.2 kg (249 lb 9 oz)   SpO2 96%   BMI 40.28 kg/m²   Body mass index is 40.28 kg/m².  CrCl cannot be calculated (Patient's most recent lab result is older than the maximum 7 days allowed.).    Lab Results   Component Value Date     09/06/2022    K 4.3 09/06/2022     09/06/2022    CO2 25 09/06/2022    BUN 17 09/06/2022    CREATININE 1.0 09/06/2022    GLU 96 09/06/2022    HGBA1C 6.0 (H) 04/05/2022    MG " 2.3 09/01/2022    AST 74 (H) 09/06/2022    ALT 82 (H) 09/06/2022    ALBUMIN 3.6 09/06/2022    PROT 7.0 09/06/2022    BILITOT 0.7 09/06/2022    WBC 6.34 09/06/2022    HGB 13.6 (L) 09/06/2022    HCT 39.2 (L) 09/06/2022    MCV 93 09/06/2022     09/06/2022    PSA 1.1 06/06/2018    TSH 1.717 03/19/2020    CHOL 136 10/11/2021    HDL 42 10/11/2021    LDLCALC 80.2 10/11/2021    TRIG 69 10/11/2021       Current Outpatient Medications   Medication Sig    aspirin (ECOTRIN) 81 MG EC tablet Take 81 mg by mouth once daily.    brimonidine 0.15 % OPTH DROP (ALPHAGAN) 0.15 % ophthalmic solution PLACE 1 GTT AUGUSTO BID    CALCIUM CITRATE/VITAMIN D2 (CALCIUM CITRATE WITH D ORAL) Take by mouth once daily.    CHOLECALCIFEROL, VITAMIN D3, (VITAMIN D3 ORAL) Take 10,000 Units by mouth once a week. Take one weekly    folic acid (FOLVITE) 1 MG tablet Take 1 mg by mouth once daily.    GLUCOSAM SUL NA/CHONDR DWYER A NA (GLUCOSAMINE-CHONDROITIN 3X ORAL) Take 2 tablets by mouth once daily.    LUMIGAN 0.01 % Drop INT 1 GTT IN OS HS    multivitamin (THERAGRAN) per tablet Take 1 tablet by mouth once daily.    prednisoLONE acetate (PRED FORTE) 1 % DrpS SHAKE LIQUID AND INSTILL 1 DROP IN LEFT EYE FOUR TIMES DAILY    simvastatin (ZOCOR) 40 MG tablet TAKE 1/2 TABLET(20 MG) BY MOUTH EVERY EVENING    tamsulosin (FLOMAX) 0.4 mg Cap TAKE 1 CAPSULE(0.4 MG) BY MOUTH EVERY EVENING    benzonatate (TESSALON) 100 MG capsule Take 2 capsules (200 mg total) by mouth 3 (three) times daily as needed for Cough.     No current facility-administered medications for this visit.       Review of Systems   Constitutional: Negative for chills, decreased appetite, malaise/fatigue, night sweats, weight gain and weight loss.   Eyes:  Negative for blurred vision, double vision, visual disturbance and visual halos.   Cardiovascular:  Negative for chest pain, claudication, cyanosis, dyspnea on exertion, irregular heartbeat, leg swelling, near-syncope, orthopnea, palpitations,  paroxysmal nocturnal dyspnea and syncope.   Respiratory:  Negative for cough, hemoptysis, snoring, sputum production and wheezing.    Endocrine: Negative for cold intolerance, heat intolerance, polydipsia and polyphagia.   Hematologic/Lymphatic: Negative for adenopathy and bleeding problem. Does not bruise/bleed easily.   Skin:  Negative for flushing, itching, poor wound healing and rash.   Musculoskeletal:  Negative for arthritis, back pain, falls, gout, joint pain, joint swelling, muscle cramps, muscle weakness, myalgias, neck pain and stiffness.   Gastrointestinal:  Negative for bloating, abdominal pain, anorexia, diarrhea, dysphagia, excessive appetite, flatus, hematemesis, jaundice, melena and nausea.   Genitourinary:  Negative for hesitancy and incomplete emptying.   Neurological:  Negative for aphonia, brief paralysis, difficulty with concentration, disturbances in coordination, excessive daytime sleepiness, dizziness, focal weakness, light-headedness, loss of balance and weakness.   Psychiatric/Behavioral:  Negative for altered mental status, depression, hallucinations, hypervigilance, memory loss, substance abuse and suicidal ideas. The patient does not have insomnia and is not nervous/anxious.      Objective:   Physical Exam  Constitutional:       General: He is not in acute distress.     Appearance: He is well-developed. He is not diaphoretic.   HENT:      Head: Normocephalic and atraumatic.      Nose: Nose normal.      Mouth/Throat:      Pharynx: No oropharyngeal exudate.   Eyes:      General: No scleral icterus.        Right eye: No discharge.         Left eye: No discharge.      Conjunctiva/sclera: Conjunctivae normal.      Pupils: Pupils are equal, round, and reactive to light.   Neck:      Thyroid: No thyromegaly.      Vascular: No JVD.      Trachea: No tracheal deviation.   Cardiovascular:      Rate and Rhythm: Normal rate and regular rhythm.      Pulses: Intact distal pulses.      Heart sounds:  Normal heart sounds. No murmur heard.    No friction rub. No gallop.   Pulmonary:      Effort: Pulmonary effort is normal. No respiratory distress.      Breath sounds: Normal breath sounds. No stridor. No wheezing or rales.   Chest:      Chest wall: No tenderness.   Abdominal:      General: Bowel sounds are normal. There is no distension.      Palpations: Abdomen is soft. There is no mass.      Tenderness: There is no abdominal tenderness.   Musculoskeletal:         General: No tenderness.      Cervical back: Normal range of motion and neck supple.   Lymphadenopathy:      Cervical: No cervical adenopathy.   Skin:     General: Skin is warm.      Coloration: Skin is not pale.      Findings: No erythema or rash.   Neurological:      Mental Status: He is alert and oriented to person, place, and time.      Cranial Nerves: No cranial nerve deficit.      Motor: No abnormal muscle tone.      Coordination: Coordination normal.      Deep Tendon Reflexes: Reflexes are normal and symmetric. Reflexes normal.   Psychiatric:         Behavior: Behavior normal.         Thought Content: Thought content normal.         Judgment: Judgment normal.       Assessment:     1. Elevated troponin I measurement    2. Elevated brain natriuretic peptide (BNP) level    3. Obesity (BMI 30.0-34.9)    4. Stage 3 chronic kidney disease, unspecified whether stage 3a or 3b CKD    5. Mixed hyperlipidemia    6. Morbid obesity due to excess calories        Plan:     Grey was seen today for establish care, follow-up and referral authorization.    Diagnoses and all orders for this visit:    Elevated troponin I measurement  -     Ambulatory referral/consult to Cardiology  -     Nuclear Stress - Cardiology Interpreted; Future    Elevated brain natriuretic peptide (BNP) level  -     Ambulatory referral/consult to Cardiology  -     Nuclear Stress - Cardiology Interpreted; Future    Obesity (BMI 30.0-34.9)    Stage 3 chronic kidney disease, unspecified whether  stage 3a or 3b CKD    Mixed hyperlipidemia  -     Cancel: Lipid Panel; Future    Morbid obesity due to excess calories    R/O obstructive CAD in otherwise sedentary asymptomatic gentleman. We discussed dietary modification.   Counseled on importance of heart healthy diet low in saturated and trans fat and salt as well gradually starting a regular aerobic exercise regimen with goal of 30min 5x/week. Recommend BP diary. Call if systolic BP > 130 mmHg on checking repeatedly    RTC 11 wks

## 2022-10-03 DIAGNOSIS — Z71.89 COMPLEX CARE COORDINATION: ICD-10-CM

## 2022-10-05 ENCOUNTER — OFFICE VISIT (OUTPATIENT)
Dept: INTERNAL MEDICINE | Facility: CLINIC | Age: 87
End: 2022-10-05
Payer: MEDICARE

## 2022-10-05 ENCOUNTER — IMMUNIZATION (OUTPATIENT)
Dept: PRIMARY CARE CLINIC | Facility: CLINIC | Age: 87
End: 2022-10-05
Payer: MEDICARE

## 2022-10-05 ENCOUNTER — LAB VISIT (OUTPATIENT)
Dept: LAB | Facility: HOSPITAL | Age: 87
End: 2022-10-05
Attending: INTERNAL MEDICINE
Payer: MEDICARE

## 2022-10-05 ENCOUNTER — IMMUNIZATION (OUTPATIENT)
Dept: INTERNAL MEDICINE | Facility: CLINIC | Age: 87
End: 2022-10-05
Payer: MEDICARE

## 2022-10-05 VITALS
SYSTOLIC BLOOD PRESSURE: 136 MMHG | OXYGEN SATURATION: 96 % | HEART RATE: 86 BPM | BODY MASS INDEX: 40.16 KG/M2 | DIASTOLIC BLOOD PRESSURE: 70 MMHG | HEIGHT: 66 IN | WEIGHT: 249.88 LBS

## 2022-10-05 DIAGNOSIS — R74.8 ELEVATED LIVER ENZYMES: Primary | ICD-10-CM

## 2022-10-05 DIAGNOSIS — N18.30 STAGE 3 CHRONIC KIDNEY DISEASE, UNSPECIFIED WHETHER STAGE 3A OR 3B CKD: ICD-10-CM

## 2022-10-05 DIAGNOSIS — Z87.39 HISTORY OF RHABDOMYOLYSIS: ICD-10-CM

## 2022-10-05 DIAGNOSIS — Z23 NEED FOR VACCINATION: Primary | ICD-10-CM

## 2022-10-05 DIAGNOSIS — R74.8 ELEVATED LIVER ENZYMES: ICD-10-CM

## 2022-10-05 DIAGNOSIS — E78.2 MIXED HYPERLIPIDEMIA: ICD-10-CM

## 2022-10-05 DIAGNOSIS — R73.03 PRE-DIABETES: ICD-10-CM

## 2022-10-05 LAB
ALBUMIN SERPL BCP-MCNC: 3.9 G/DL (ref 3.5–5.2)
ALP SERPL-CCNC: 73 U/L (ref 55–135)
ALT SERPL W/O P-5'-P-CCNC: 26 U/L (ref 10–44)
ANION GAP SERPL CALC-SCNC: 9 MMOL/L (ref 8–16)
AST SERPL-CCNC: 20 U/L (ref 10–40)
BILIRUB SERPL-MCNC: 0.4 MG/DL (ref 0.1–1)
BUN SERPL-MCNC: 16 MG/DL (ref 8–23)
CALCIUM SERPL-MCNC: 10.1 MG/DL (ref 8.7–10.5)
CHLORIDE SERPL-SCNC: 104 MMOL/L (ref 95–110)
CK SERPL-CCNC: 69 U/L (ref 20–200)
CO2 SERPL-SCNC: 25 MMOL/L (ref 23–29)
CREAT SERPL-MCNC: 1.2 MG/DL (ref 0.5–1.4)
EST. GFR  (NO RACE VARIABLE): 59.3 ML/MIN/1.73 M^2
ESTIMATED AVG GLUCOSE: 120 MG/DL (ref 68–131)
GLUCOSE SERPL-MCNC: 93 MG/DL (ref 70–110)
HBA1C MFR BLD: 5.8 % (ref 4–5.6)
POTASSIUM SERPL-SCNC: 4.3 MMOL/L (ref 3.5–5.1)
PROT SERPL-MCNC: 7.2 G/DL (ref 6–8.4)
SODIUM SERPL-SCNC: 138 MMOL/L (ref 136–145)

## 2022-10-05 PROCEDURE — 82550 ASSAY OF CK (CPK): CPT | Performed by: INTERNAL MEDICINE

## 2022-10-05 PROCEDURE — 83036 HEMOGLOBIN GLYCOSYLATED A1C: CPT | Performed by: INTERNAL MEDICINE

## 2022-10-05 PROCEDURE — 36415 COLL VENOUS BLD VENIPUNCTURE: CPT | Performed by: INTERNAL MEDICINE

## 2022-10-05 PROCEDURE — G0008 ADMIN INFLUENZA VIRUS VAC: HCPCS | Mod: PBBFAC

## 2022-10-05 PROCEDURE — 91313 COVID-19, MRNA, LNP-S, BIVALENT BOOSTER, PF, 50 MCG/0.5 ML: CPT | Mod: PBBFAC | Performed by: INTERNAL MEDICINE

## 2022-10-05 PROCEDURE — 99999 PR PBB SHADOW E&M-EST. PATIENT-LVL III: ICD-10-PCS | Mod: PBBFAC,,, | Performed by: INTERNAL MEDICINE

## 2022-10-05 PROCEDURE — 99214 PR OFFICE/OUTPT VISIT, EST, LEVL IV, 30-39 MIN: ICD-10-PCS | Mod: S$PBB,,, | Performed by: INTERNAL MEDICINE

## 2022-10-05 PROCEDURE — 99214 OFFICE O/P EST MOD 30 MIN: CPT | Mod: S$PBB,,, | Performed by: INTERNAL MEDICINE

## 2022-10-05 PROCEDURE — 80053 COMPREHEN METABOLIC PANEL: CPT | Performed by: INTERNAL MEDICINE

## 2022-10-05 PROCEDURE — 99999 PR PBB SHADOW E&M-EST. PATIENT-LVL III: CPT | Mod: PBBFAC,,, | Performed by: INTERNAL MEDICINE

## 2022-10-05 PROCEDURE — 0134A COVID-19, MRNA, LNP-S, BIVALENT BOOSTER, PF, 50 MCG/0.5 ML: CPT | Mod: CV19,PBBFAC | Performed by: INTERNAL MEDICINE

## 2022-10-05 PROCEDURE — 99213 OFFICE O/P EST LOW 20 MIN: CPT | Mod: PBBFAC,25 | Performed by: INTERNAL MEDICINE

## 2022-10-05 NOTE — PROGRESS NOTES
"Subjective:       Patient ID: Grey Vance Jr. is a 87 y.o. male.    Chief Complaint: Follow-up (6 mth )    HPI  87 y.o. male here for follow up of    Prediabetes - 6.0% in April 2022.    Liver enzymes trending down on 9/6/22.  Ast 207 -> 74  ALT 63 -> 82    CKD3  Recent labs with gfr >60.     Seen in ED for influenza A with rhabdomyolysis on 9/1. He had elevated CK, troponin during that admission.   Followed up with cardiology on 9/21 and she ordered nuclear stress. Scheduled next week.     He has had a mild cough even before having the flu but is resolved now. No chest pain nor shortness of breath.     Review of Systems   Constitutional:  Negative for fever.   Respiratory:  Negative for shortness of breath.    Cardiovascular:  Negative for chest pain.   Musculoskeletal: Negative.    Skin: Negative.      Objective:   /70 (BP Location: Left arm, Patient Position: Sitting, BP Method: Medium (Manual))   Pulse 86   Ht 5' 6.25" (1.683 m)   Wt 113.3 kg (249 lb 14.3 oz)   SpO2 96%   BMI 40.03 kg/m²      Physical Exam  Constitutional:       General: He is not in acute distress.     Appearance: He is well-developed. He is not diaphoretic.   HENT:      Head: Normocephalic and atraumatic.   Cardiovascular:      Rate and Rhythm: Normal rate and regular rhythm.      Heart sounds: No murmur heard.  Pulmonary:      Effort: Pulmonary effort is normal. No respiratory distress.      Breath sounds: No wheezing or rales.   Skin:     General: Skin is warm and dry.   Neurological:      Mental Status: He is alert and oriented to person, place, and time.   Psychiatric:         Behavior: Behavior normal.       Assessment:       1. Elevated liver enzymes    2. Mixed hyperlipidemia    3. Stage 3 chronic kidney disease, unspecified whether stage 3a or 3b CKD    4. Pre-diabetes    5. History of rhabdomyolysis        Plan:       Grey was seen today for follow-up.    Diagnoses and all orders for this visit:    Hx of Elevated " liver enzymes, rhabdomyolysis seemingly all secondary to influenza. Will trend labs to ensure have returned to normal. He is asymptomatic at this time. Keep scheduled stress test next week due to also having elevated troponin at that time.   -     Comprehensive Metabolic Panel; Future  -     CK; Future      Stage 3 chronic kidney disease, unspecified whether stage 3a or 3b CKD  Cmp    Pre-diabetes  -     Hemoglobin A1C; Future    HCM  Flu HD  Covid booster - moderna

## 2022-10-10 ENCOUNTER — TELEPHONE (OUTPATIENT)
Dept: CARDIOLOGY | Facility: HOSPITAL | Age: 87
End: 2022-10-10
Payer: MEDICARE

## 2022-10-11 ENCOUNTER — HOSPITAL ENCOUNTER (OUTPATIENT)
Dept: CARDIOLOGY | Facility: HOSPITAL | Age: 87
Discharge: HOME | End: 2022-10-11
Attending: INTERNAL MEDICINE
Payer: MEDICARE

## 2022-10-11 VITALS
DIASTOLIC BLOOD PRESSURE: 85 MMHG | HEIGHT: 66 IN | SYSTOLIC BLOOD PRESSURE: 156 MMHG | BODY MASS INDEX: 40.33 KG/M2 | HEART RATE: 70 BPM

## 2022-10-11 DIAGNOSIS — R79.89 ELEVATED TROPONIN I MEASUREMENT: ICD-10-CM

## 2022-10-11 DIAGNOSIS — R79.89 ELEVATED BRAIN NATRIURETIC PEPTIDE (BNP) LEVEL: ICD-10-CM

## 2022-10-11 LAB
CV PHARM DOSE: 0.4 MG
CV STRESS BASE HR: 70 BPM
DIASTOLIC BLOOD PRESSURE: 83 MMHG
EJECTION FRACTION- HIGH: 65 %
END DIASTOLIC INDEX-HIGH: 153 ML/M2
END DIASTOLIC INDEX-LOW: 93 ML/M2
END SYSTOLIC INDEX-HIGH: 71 ML/M2
END SYSTOLIC INDEX-LOW: 31 ML/M2
NUC REST DIASTOLIC VOLUME INDEX: 51
NUC REST EJECTION FRACTION: 61
NUC REST SYSTOLIC VOLUME INDEX: 20
NUC STRESS DIASTOLIC VOLUME INDEX: 62
NUC STRESS EJECTION FRACTION: 65 %
NUC STRESS SYSTOLIC VOLUME INDEX: 22
OHS CV CPX 85 PERCENT MAX PREDICTED HEART RATE MALE: 115
OHS CV CPX MAX PREDICTED HEART RATE: 135
OHS CV CPX PATIENT IS FEMALE: 0
OHS CV CPX PATIENT IS MALE: 1
OHS CV CPX PEAK DIASTOLIC BLOOD PRESSURE: 78 MMHG
OHS CV CPX PEAK HEAR RATE: 74 BPM
OHS CV CPX PEAK RATE PRESSURE PRODUCT: NORMAL
OHS CV CPX PEAK SYSTOLIC BLOOD PRESSURE: 161 MMHG
OHS CV CPX PERCENT MAX PREDICTED HEART RATE ACHIEVED: 55
OHS CV CPX RATE PRESSURE PRODUCT PRESENTING: NORMAL
RETIRED EF AND QEF - SEE NOTES: 53 %
SUMMED DIFFERENCE: 0
SUMMED REST SCORE: 0
SUMMED STRESS SCORE: 0
SYSTOLIC BLOOD PRESSURE: 156 MMHG

## 2022-10-11 PROCEDURE — 93018 CV STRESS TEST I&R ONLY: CPT | Mod: ,,, | Performed by: INTERNAL MEDICINE

## 2022-10-11 PROCEDURE — 93016 CV STRESS TEST SUPVJ ONLY: CPT | Mod: ,,, | Performed by: INTERNAL MEDICINE

## 2022-10-11 PROCEDURE — A9502 TC99M TETROFOSMIN: HCPCS

## 2022-10-11 PROCEDURE — 78452 HT MUSCLE IMAGE SPECT MULT: CPT | Mod: 26,,, | Performed by: INTERNAL MEDICINE

## 2022-10-11 PROCEDURE — 78452 STRESS TEST WITH MYOCARDIAL PERFUSION (CUPID ONLY): ICD-10-PCS | Mod: 26,,, | Performed by: INTERNAL MEDICINE

## 2022-10-11 PROCEDURE — 93016 STRESS TEST WITH MYOCARDIAL PERFUSION (CUPID ONLY): ICD-10-PCS | Mod: ,,, | Performed by: INTERNAL MEDICINE

## 2022-10-11 PROCEDURE — 93018 STRESS TEST WITH MYOCARDIAL PERFUSION (CUPID ONLY): ICD-10-PCS | Mod: ,,, | Performed by: INTERNAL MEDICINE

## 2022-10-11 PROCEDURE — 63600175 PHARM REV CODE 636 W HCPCS: Performed by: INTERNAL MEDICINE

## 2022-10-11 RX ORDER — REGADENOSON 0.08 MG/ML
0.4 INJECTION, SOLUTION INTRAVENOUS ONCE
Status: COMPLETED | OUTPATIENT
Start: 2022-10-11 | End: 2022-10-11

## 2022-10-11 RX ADMIN — REGADENOSON 0.4 MG: 0.08 INJECTION, SOLUTION INTRAVENOUS at 08:10

## 2022-10-17 NOTE — PROGRESS NOTES
As Dr. Jones requested me to do is to inform Mr. Vance about his test results. I informed Mr. Vancethat his stress test was normal. He verbalized and understood

## 2022-10-19 ENCOUNTER — HOSPITAL ENCOUNTER (EMERGENCY)
Facility: HOSPITAL | Age: 87
Discharge: LEFT BEFORE DISCHARGE | End: 2022-10-19
Attending: EMERGENCY MEDICINE
Payer: MEDICARE

## 2022-10-19 VITALS
HEIGHT: 66 IN | BODY MASS INDEX: 38.57 KG/M2 | SYSTOLIC BLOOD PRESSURE: 139 MMHG | DIASTOLIC BLOOD PRESSURE: 65 MMHG | HEART RATE: 74 BPM | RESPIRATION RATE: 18 BRPM | WEIGHT: 240 LBS | OXYGEN SATURATION: 97 % | TEMPERATURE: 98 F

## 2022-10-19 DIAGNOSIS — M25.562 LEFT KNEE PAIN: ICD-10-CM

## 2022-10-19 DIAGNOSIS — Z53.29 LEFT AGAINST MEDICAL ADVICE: Primary | ICD-10-CM

## 2022-10-19 PROCEDURE — 99284 EMERGENCY DEPT VISIT MOD MDM: CPT | Mod: 25

## 2022-10-19 PROCEDURE — 99282 EMERGENCY DEPT VISIT SF MDM: CPT | Mod: ,,, | Performed by: EMERGENCY MEDICINE

## 2022-10-19 PROCEDURE — 99282 PR EMERGENCY DEPT VISIT,LEVEL II: ICD-10-PCS | Mod: ,,, | Performed by: EMERGENCY MEDICINE

## 2022-10-19 RX ORDER — BALOXAVIR MARBOXIL 80 MG/1
TABLET, FILM COATED ORAL
COMMUNITY
Start: 2022-09-19 | End: 2023-01-31

## 2022-10-19 RX ORDER — COVID-19 MOLECULAR TEST ASSAY
KIT MISCELLANEOUS
COMMUNITY
Start: 2022-09-19

## 2022-10-19 RX ORDER — LATANOPROST 50 UG/ML
1 SOLUTION/ DROPS OPHTHALMIC NIGHTLY
COMMUNITY
Start: 2022-09-19

## 2022-10-19 NOTE — ED TRIAGE NOTES
PT states that he has had knee pain since Sunday night. He can walk on it but it's painful. He states he took some ibuprofen with some relief.

## 2022-10-19 NOTE — ED NOTES
SAVANNA Ponce discussed with pt AMA and consequences of leaving. Pt verbalized understanding and signed necessary paperwork.

## 2022-10-19 NOTE — ED PROVIDER NOTES
Encounter Date: 10/19/2022       History     Chief Complaint   Patient presents with    Knee Pain     Started Monday, denies injury     The history is provided by the patient and medical records. No  was used.     Grey Vance Jr. is a 87 y.o. male with medical history of Pre-DM, HLD, CKD, Osteoarthritis, Hx of rhabdo presenting to the ED with the chief complaint of knee pain.     Reports waking up 3 days ago with medial L knee pain. Denies falls or trauma. Reports pain worsens whenever he bears weight. Denies issues ambulating and does not use assistive devices. Denies numbness, paresthesias, extremity weakness, lower back pain. Reports having chronic swelling to his lower extremities. No chest pain, SOB, cough, abdominal pain. Reports taking Tylenol for pain earlier today.     Of note, on arrival patient reports having to leave in 45 minutes to provide his  a ride.       Review of patient's allergies indicates:   Allergen Reactions    Bee pollens      Past Medical History:   Diagnosis Date    BPH with elevated PSA     Cataract     CKD (chronic kidney disease) stage 3, GFR 30-59 ml/min     Fever blister     H/O impaired glucose tolerance 2014    Hyperlipidemia     Pre-diabetes     Primary osteoarthritis of left hip 11/3/2015     Past Surgical History:   Procedure Laterality Date    THROAT SURGERY  20years    TONSILLECTOMY      VASECTOMY       Family History   Problem Relation Age of Onset    Melanoma Neg Hx      Social History     Tobacco Use    Smoking status: Former     Types: Cigarettes     Quit date: 3/8/1969     Years since quittin.7    Smokeless tobacco: Never   Substance Use Topics    Alcohol use: No     Alcohol/week: 0.0 standard drinks     Comment: none since age 52    Drug use: No     Review of Systems   Constitutional:  Negative for fever.   HENT:  Negative for sore throat.    Respiratory:  Negative for shortness of breath.    Cardiovascular:  Positive for leg  swelling (chronic BL). Negative for chest pain.   Gastrointestinal:  Negative for nausea.   Genitourinary:  Negative for dysuria.   Musculoskeletal:  Positive for arthralgias. Negative for back pain.   Skin:  Negative for rash.   Neurological:  Negative for weakness.   Hematological:  Does not bruise/bleed easily.     Physical Exam     Initial Vitals [10/19/22 1458]   BP Pulse Resp Temp SpO2   135/64 74 16 97.4 °F (36.3 °C) 97 %      MAP       --         Physical Exam    Constitutional: He appears well-developed and well-nourished. He is not diaphoretic. He is easily aroused.   HENT:   Head: Normocephalic and atraumatic.   Mouth/Throat: Oropharynx is clear and moist. No oropharyngeal exudate.   Eyes: EOM and lids are normal. Pupils are equal, round, and reactive to light. No scleral icterus.   Neck: Phonation normal. Neck supple. No stridor present.   Normal range of motion.  Cardiovascular:  Normal rate and regular rhythm.           +2 DP pulses BLE   Pulmonary/Chest: Breath sounds normal. No stridor. No respiratory distress. He has no wheezes. He has no rales.   Abdominal: Abdomen is soft. He exhibits no distension. There is no abdominal tenderness. There is no rebound.   Musculoskeletal:         General: No tenderness (TTP inferior-medial aspect of L knee) or edema. Normal range of motion.      Cervical back: Normal range of motion and neck supple.      Comments: Mild suprapatellar edema to L knee. Full A/P ROM of BLE     Neurological: He is alert, oriented to person, place, and time and easily aroused. He has normal strength. No sensory deficit.   Skin: Skin is warm and dry. No rash noted. No erythema.   Psychiatric: He has a normal mood and affect. His speech is normal.       ED Course   Procedures  Labs Reviewed - No data to display         Imaging Results              US Lower Extremity Veins Left (Final result)  Result time 10/19/22 18:22:00      Final result by Harjinder Becerra MD (10/19/22 18:22:00)                    Impression:      No evidence of deep venous thrombosis in the left lower extremity.    Electronically signed by resident: Govind Loera  Date:    10/19/2022  Time:    18:15    Electronically signed by: Harjinder Becerra MD  Date:    10/19/2022  Time:    18:22               Narrative:    EXAMINATION:  US LOWER EXTREMITY VEINS LEFT    CLINICAL HISTORY:  Pain in left knee    TECHNIQUE:  Duplex and color flow Doppler evaluation and graded compression of the left lower extremity veins was performed.    COMPARISON:  None    FINDINGS:  Left thigh veins: The common femoral, femoral, popliteal, upper greater saphenous, and deep femoral veins are patent and free of thrombus. The veins are normally compressible and have normal phasic flow and augmentation response.    Left calf veins: The visualized calf veins are patent.    Contralateral CFV: The contralateral (right) common femoral vein is patent and free of thrombus.    Miscellaneous: None                                       Medications - No data to display  Medical Decision Making:   History:   Old Medical Records: I decided to obtain old medical records.  Old Records Summarized: records from clinic visits.  Clinical Tests:   Radiological Study: Ordered and Reviewed     APC / Resident Notes:   87 y.o. male with medical history of Pre-DM, HLD, CKD, Osteoarthritis, Hx of rhabdo presenting to the ED c/o atraumatic L knee pain for 3 days. DDx includes but not limited to osteoarthritis, ligament injury, meniscus injury, gouty arthritis, baker's cyst, DVT. Full A/P ROM of extremity. No warmth. Do not suspect septic joint.             Notified by nurse that patient did not want to stay in the ED any longer. Reports he has to leave to go provide his  a ride. His venous LLE U/S was completed, but I explained that I did not have the radiology results back. X-ray of L knee has not been completed yet. Discussed risks associated with a DVT and having it not  appropriately treated. Patient acknowledged the risks and maintained he did not want to stay. Patient signed out AMA paperwork. Advised patient to return to the ED when he is ready to pursue treatment and to follow-up with his PCP. Patient expresses understanding and agreeable to the plan.       Clinical Impression:   Final diagnoses:  [M25.562] Left knee pain  [Z53.29] Left against medical advice (Primary)      ED Disposition Condition    AMA Stable                Harjinder Ponce PA-C  10/19/22 2005       Harjinder Ponce PA-C  11/08/22 1717

## 2022-10-20 ENCOUNTER — OFFICE VISIT (OUTPATIENT)
Dept: URGENT CARE | Facility: CLINIC | Age: 87
End: 2022-10-20
Payer: MEDICARE

## 2022-10-20 VITALS
HEART RATE: 82 BPM | BODY MASS INDEX: 38.57 KG/M2 | DIASTOLIC BLOOD PRESSURE: 87 MMHG | HEIGHT: 66 IN | RESPIRATION RATE: 20 BRPM | SYSTOLIC BLOOD PRESSURE: 159 MMHG | WEIGHT: 240 LBS | OXYGEN SATURATION: 98 % | TEMPERATURE: 98 F

## 2022-10-20 DIAGNOSIS — M25.562 ACUTE PAIN OF LEFT KNEE: Primary | ICD-10-CM

## 2022-10-20 PROCEDURE — 99214 PR OFFICE/OUTPT VISIT, EST, LEVL IV, 30-39 MIN: ICD-10-PCS | Mod: S$GLB,,, | Performed by: STUDENT IN AN ORGANIZED HEALTH CARE EDUCATION/TRAINING PROGRAM

## 2022-10-20 PROCEDURE — 73562 XR KNEE 3 VIEW LEFT: ICD-10-PCS | Mod: FY,LT,S$GLB, | Performed by: RADIOLOGY

## 2022-10-20 PROCEDURE — 99214 OFFICE O/P EST MOD 30 MIN: CPT | Mod: S$GLB,,, | Performed by: STUDENT IN AN ORGANIZED HEALTH CARE EDUCATION/TRAINING PROGRAM

## 2022-10-20 PROCEDURE — 73562 X-RAY EXAM OF KNEE 3: CPT | Mod: FY,LT,S$GLB, | Performed by: RADIOLOGY

## 2022-10-20 RX ORDER — DICLOFENAC SODIUM 10 MG/G
2 GEL TOPICAL DAILY
Qty: 100 G | Refills: 0 | Status: SHIPPED | OUTPATIENT
Start: 2022-10-20 | End: 2023-11-06

## 2022-10-20 NOTE — PROGRESS NOTES
"Subjective:       Patient ID: Grey Vance Jr. is a 87 y.o. male.    Vitals:  height is 5' 6" (1.676 m) and weight is 108.9 kg (240 lb). His temperature is 97.6 °F (36.4 °C). His blood pressure is 159/87 (abnormal) and his pulse is 82. His respiration is 20 and oxygen saturation is 98%.     Chief Complaint: Knee Pain    Pt reports left knee pain that started Sunday. He reports waking up Monday morning with knee pain. He denies doing anything to cause this. He has took Alieve yesterday but nothing today. He went to [facility] yesterday got an ultrasound to check for blood clots and it came back negative. He was waiting for an xray but it got so late and he had to leave. He was told to come here to get the xray.    Knee Pain   The incident occurred 3 to 5 days ago. The incident occurred at home. The pain is present in the left knee. The pain is at a severity of 5/10. Associated symptoms include an inability to bear weight. The symptoms are aggravated by weight bearing. He has tried nothing for the symptoms.   ROS    Objective:      Physical Exam   Constitutional: He is oriented to person, place, and time. He does not appear ill. No distress.   HENT:   Head: Normocephalic.   Eyes: Conjunctivae are normal.   Pulmonary/Chest: No respiratory distress.   Musculoskeletal:      Left knee: He exhibits normal range of motion, no swelling, no ecchymosis, no erythema, normal alignment, normal patellar mobility, no bony tenderness, normal meniscus and no MCL laxity. Tenderness found. MCL tenderness noted. No lateral joint line, no LCL and no patellar tendon tenderness noted.   Neurological: He is alert and oriented to person, place, and time. Gait (ambulates with cane, no limping) abnormal. Coordination normal.   Skin: Skin is warm and dry.   Nursing note and vitals reviewed.      Assessment:       1. Acute pain of left knee        EXAMINATION:  XR KNEE 3 VIEW LEFT     CLINICAL HISTORY:  Pain, unspecified     TECHNIQUE:  AP, " lateral, and Merchant views of the left knee were performed.     COMPARISON:  None.     FINDINGS:  The skeletal structures are intact.  The joint spaces are fairly well preserved for age without significant cartilage loss, erosion, or joint effusion.  Patella may be a little subluxed superiorly.  No focal soft tissue swelling is seen.     Impression:     No acute abnormality or significant arthritis        Electronically signed by: Delgado Mack MD  Date:                                            10/20/2022  Time:                                           14:58  Plan:         Acute pain of left knee  -     XR KNEE 3 VIEW LEFT; Future; Expected date: 10/20/2022  -     diclofenac sodium (VOLTAREN) 1 % Gel; Apply 2 g topically once daily.  Dispense: 100 g; Refill: 0  -     Ambulatory referral/consult to Sports Medicine       Reviewed x-ray findings with the patientDiscussed concern for any sprain/strain discussed with the patient.  Recommended use of supportive knee brace, topical anti-inflammatory, and attention to not aggravate the affected area.  A referral was placed the patient will have follow-up if needed if symptoms persist.  All patient questions and concerns were addressed prior to discharge.

## 2023-01-18 ENCOUNTER — HOSPITAL ENCOUNTER (EMERGENCY)
Facility: HOSPITAL | Age: 88
Discharge: HOME | End: 2023-01-18
Attending: EMERGENCY MEDICINE
Payer: MEDICARE

## 2023-01-18 VITALS
TEMPERATURE: 98 F | SYSTOLIC BLOOD PRESSURE: 158 MMHG | RESPIRATION RATE: 18 BRPM | WEIGHT: 250 LBS | OXYGEN SATURATION: 98 % | DIASTOLIC BLOOD PRESSURE: 77 MMHG | BODY MASS INDEX: 40.35 KG/M2 | HEART RATE: 74 BPM

## 2023-01-18 DIAGNOSIS — R05.9 COUGH: ICD-10-CM

## 2023-01-18 DIAGNOSIS — J40 BRONCHITIS: Primary | ICD-10-CM

## 2023-01-18 LAB
CTP QC/QA: YES
CTP QC/QA: YES
POC MOLECULAR INFLUENZA A AGN: NEGATIVE
POC MOLECULAR INFLUENZA B AGN: NEGATIVE
SARS-COV-2 RDRP RESP QL NAA+PROBE: NEGATIVE

## 2023-01-18 PROCEDURE — 87635 SARS-COV-2 COVID-19 AMP PRB: CPT | Performed by: EMERGENCY MEDICINE

## 2023-01-18 PROCEDURE — 87502 INFLUENZA DNA AMP PROBE: CPT

## 2023-01-18 PROCEDURE — 99283 EMERGENCY DEPT VISIT LOW MDM: CPT | Mod: 25

## 2023-01-18 PROCEDURE — 36000 PLACE NEEDLE IN VEIN: CPT

## 2023-01-18 RX ORDER — METHYLPREDNISOLONE 4 MG/1
TABLET ORAL
Qty: 1 EACH | Refills: 0 | Status: SHIPPED | OUTPATIENT
Start: 2023-01-18 | End: 2023-01-31

## 2023-01-18 NOTE — ED TRIAGE NOTES
Review of patient's allergies indicates:   Allergen Reactions    Bee pollens      Chief Complaint   Patient presents with    Cough     Pt has had a cough for a few weeks. Pt went to urgent care this morning and had a chest xray. Pt told to report to the ER for eval and treatment of possible pneumonia. Pt denies fever and the cough is non productive. Pt denies shortness of breath. Pt appears in no distress.      Past Medical History:   Diagnosis Date    BPH with elevated PSA     Cataract     CKD (chronic kidney disease) stage 3, GFR 30-59 ml/min     Fever blister     H/O impaired glucose tolerance 4/2/2014    Hyperlipidemia     Pre-diabetes     Primary osteoarthritis of left hip 11/3/2015       Patient identifiers verified and correct.     LOC: The patient is awake, alert and aware of environment with an appropriate affect, the patient is oriented x 3 and speaking appropriately.     APPEARANCE: Patient appears comfortable and in no acute distress, patient is clean and well groomed.    HEENT: Head symmetrical. Eyes bilateral.  Bilateral ears without drainage. Bilateral nares patent, throat clear.    SKIN: The skin is warm and dry, color consistent with ethnicity, patient has normal skin turgor and moist mucus membranes, skin intact, no breakdown or bruising noted.     MUSCULOSKELETAL: Patient moving all extremities spontaneously, no swelling noted.    RESPIRATORY: Airway is open and patent, respirations are spontaneous, patient has a normal effort and rate, no accessory muscle use noted.     CARDIAC: Patient has a normal rate and regular rhythm, no edema noted, capillary refill < 3 seconds.     GASTRO: Abdomen soft and non-distended.    NEURO: Pt opens eyes spontaneously pupils equal, round, and reactive. behavior appropriate to situation, follows commands, facial expression symmetrical,      NEUROVASCULAR: All extremities are warm and pink.     Will continue to monitor.

## 2023-01-18 NOTE — FIRST PROVIDER EVALUATION
Emergency Department TeleTriage Encounter Note      CHIEF COMPLAINT    Chief Complaint   Patient presents with    Cough     Pt has had a cough for a few weeks. Pt went to urgent care this morning and had a chest xray. Pt told to report to the ER for eval and treatment of possible pneumonia. Pt denies fever and the cough is non productive. Pt denies shortness of breath. Pt appears in no distress.        VITAL SIGNS   Initial Vitals [01/18/23 1326]   BP Pulse Resp Temp SpO2   (!) 158/77 74 18 98.1 °F (36.7 °C) 98 %      MAP       --            ALLERGIES    Review of patient's allergies indicates:   Allergen Reactions    Bee pollens        PROVIDER TRIAGE NOTE  Patient reports a cough for a few weeks. He was treated at an urgent care and felt better but then cough returned and became worse. He returned to urgent care today and they diagnosed his with pneumonia and advised him to go to the ED.       ORDERS  Labs Reviewed - No data to display    ED Orders (720h ago, onward)      Start Ordered     Status Ordering Provider    01/18/23 1352 01/18/23 1352  X-Ray Chest PA And Lateral  1 time imaging         Ordered BARON WILKS              Virtual Visit Note: The provider triage portion of this emergency department evaluation and documentation was performed via [employer], a HIPAA-compliant telemedicine application, in concert with a tele-presenter in the room. A face to face patient evaluation with one of my colleagues will occur once the patient is placed in an emergency department room.      DISCLAIMER: This note was prepared with [employer] voice recognition transcription software. Garbled syntax, mangled pronouns, and other bizarre constructions may be attributed to that software system.

## 2023-01-18 NOTE — DISCHARGE INSTRUCTIONS

## 2023-01-18 NOTE — ED PROVIDER NOTES
"Encounter Date: 2023       History     Chief Complaint   Patient presents with    Cough     Pt has had a cough for a few weeks. Pt went to urgent care this morning and had a chest xray. Pt told to report to the ER for eval and treatment of possible pneumonia. Pt denies fever and the cough is non productive. Pt denies shortness of breath. Pt appears in no distress.      87-year-old male presents to ED by request of urgent care for further evaluation of dry cough.  Cough onset 2 weeks ago.  Patient reports he was initially treated at urgent care when symptoms 1st began with prescription for Tessalon Perles which was initially helping to improve his cough.  He states his cough gradually returned so he reported back to urgent care this morning who performed x-rays but was concern for an "atypical pneumonia" and was deferred to ED for further evaluation.  Since arrival to ED, patient does admit he is cough symptoms seem now be improving.  He denies any associated fevers, chills, nasal congestion, sore throat, chest pain, shortness of breath, acute leg swelling, abdominal pain, nausea, vomiting, urinary or bowel complaints.  No known sick contacts.  No other acute complaints at this time.    The history is provided by the patient.   Review of patient's allergies indicates:   Allergen Reactions    Bee pollens      Past Medical History:   Diagnosis Date    BPH with elevated PSA     Cataract     CKD (chronic kidney disease) stage 3, GFR 30-59 ml/min     Fever blister     H/O impaired glucose tolerance 2014    Hyperlipidemia     Pre-diabetes     Primary osteoarthritis of left hip 11/3/2015     Past Surgical History:   Procedure Laterality Date    THROAT SURGERY  20years    TONSILLECTOMY      VASECTOMY       Family History   Problem Relation Age of Onset    Melanoma Neg Hx      Social History     Tobacco Use    Smoking status: Former     Types: Cigarettes     Quit date: 3/8/1969     Years since quittin.9    " Smokeless tobacco: Never   Substance Use Topics    Alcohol use: No     Alcohol/week: 0.0 standard drinks     Comment: none since age 52    Drug use: No     Review of Systems   Constitutional:  Negative for appetite change, chills, fatigue and fever.   HENT:  Negative for congestion, ear pain and sore throat.    Respiratory:  Positive for cough. Negative for shortness of breath.    Cardiovascular:  Negative for chest pain.   Gastrointestinal:  Negative for abdominal pain, diarrhea, nausea and vomiting.   Musculoskeletal:  Negative for myalgias, neck pain and neck stiffness.   Neurological:  Negative for headaches.     Physical Exam     Initial Vitals [01/18/23 1326]   BP Pulse Resp Temp SpO2   (!) 158/77 74 18 98.1 °F (36.7 °C) 98 %      MAP       --         Physical Exam    Nursing note and vitals reviewed.  Constitutional: He appears well-developed and well-nourished. He is cooperative. He does not have a sickly appearance. He does not appear ill. No distress.   HENT:   Head: Normocephalic and atraumatic.   Mouth/Throat: Uvula is midline and oropharynx is clear and moist.   Eyes: EOM are normal.   Neck: Neck supple.   Normal range of motion.  Cardiovascular:  Normal rate and regular rhythm.           Pulmonary/Chest: Effort normal and breath sounds normal.   Speaking in full sentences with no labored breathing and no signs of respiratory distress.  No cough noted while in ED.  Lungs are clear bilaterally with no rales or crackles.   Abdominal: Abdomen is soft.   Musculoskeletal:         General: No tenderness.      Cervical back: Normal range of motion and neck supple.     Neurological: He is alert and oriented to person, place, and time. GCS score is 15. GCS eye subscore is 4. GCS verbal subscore is 5. GCS motor subscore is 6.   Skin: Skin is warm and dry.   Psychiatric: He has a normal mood and affect. His speech is normal and behavior is normal. Thought content normal.       ED Course   Procedures  Labs Reviewed  "  POCT INFLUENZA A/B MOLECULAR   SARS-COV-2 RDRP GENE          Imaging Results              X-Ray Chest PA And Lateral (Final result)  Result time 01/18/23 14:42:05      Final result by Dick Ramsey MD (01/18/23 14:42:05)                   Impression:      No detrimental change when compared with 09/06/2022.      Electronically signed by: Dick Ramsey MD  Date:    01/18/2023  Time:    14:42               Narrative:    EXAMINATION:  XR CHEST PA AND LATERAL    CLINICAL HISTORY:  Provided history is "  Cough, unspecified".    TECHNIQUE:  Frontal and lateral views of the chest were performed.    COMPARISON:  09/06/2022 and 08/31/2022.    FINDINGS:  Cardiomediastinal silhouette is stable and not significantly enlarged.  Atherosclerotic calcifications overlie the aortic arch.  Suspect subsegmental atelectasis or scarring in the left lung base.  No confluent area of consolidation.  No sizable pleural effusion.  No pneumothorax.  No detrimental change when compared with the prior study.                                       X-Ray Chest PA And Lateral (No Result on File)                      Medications - No data to display  Medical Decision Making:   Initial Assessment:   Patient presents by requested urgent care for evaluation of cough that began 2 weeks ago.  Cough was initially improved with Tessalon Perles but has since returned.  Denying any other URI like symptoms, fevers, chills, chest pain or shortness of breath.  Afebrile arrival with vitals grossly WNL.  O2 sat 98% on room air.  Patient otherwise very well-appearing and in no apparent distress.  Lungs are clear on exam.  Patient admitting cough symptoms have significantly improved since this morning.  No cough while in ED.  Differential Diagnosis:   COVID, influenza, viral, URI, allergy/irritant, pneumonia, bronchitis  ED Management:  COVID and flu test are negative.  CXR showing no acute cardiopulmonary findings or focal consolidations.  Patient " otherwise remaining very well-appearing with stable vitals.  Lungs are clear.  No cough during ED visit.  He will be treated for bronchitis with prescription for Medrol Dosepak.  Instructed to begin Medrol Dosepak tomorrow morning if cough continues.  He also continue his home Tessalon Perles as instructed.  Encouraged to continue monitor symptoms closely with close PCP follow-up and high ED return precautions.  Patient states his understanding and agrees with plan.    Patient was discussed with attending, Dr. Snyder, who agrees with ED course and dispo.                        Clinical Impression:   Final diagnoses:  [R05.9] Cough  [J40] Bronchitis (Primary)        ED Disposition Condition    Discharge Stable          ED Prescriptions       Medication Sig Dispense Start Date End Date Auth. Provider    methylPREDNISolone (MEDROL DOSEPACK) 4 mg tablet Please take medication as recommended 1 each 1/18/2023 2/8/2023 Darian Michael PA-C          Follow-up Information       Follow up With Specialties Details Why Contact Info    Maryjane Adkins MD Internal Medicine   [address]  [place] [address]  [phone]               Darian Michael PA-C  01/18/23 1135

## 2023-01-21 DIAGNOSIS — E78.5 HYPERLIPIDEMIA: ICD-10-CM

## 2023-01-21 NOTE — TELEPHONE ENCOUNTER
Care Due:                  Date            Visit Type   Department     Provider  --------------------------------------------------------------------------------                                EP -                              PRIMARY      [facility]  Last Visit: 10-      CARE ([facility])   MEDICINE       SAUNDRA LOZADA  Next Visit: None Scheduled  None         None Found                                                            Last  Test          Frequency    Reason                     Performed    Due Date  --------------------------------------------------------------------------------    Lipid Panel.  12 months..  simvastatin..............  10-   10-    [facility] Embedded Care Gaps. Reference number: [id number]. 1/21/2023   3:04:59 PM CST

## 2023-01-23 RX ORDER — SIMVASTATIN 40 MG/1
TABLET, FILM COATED ORAL
Qty: 45 TABLET | Refills: 4 | Status: SHIPPED | OUTPATIENT
Start: 2023-01-23 | End: 2023-11-06 | Stop reason: SDUPTHER

## 2023-01-31 ENCOUNTER — OFFICE VISIT (OUTPATIENT)
Dept: INTERNAL MEDICINE | Facility: CLINIC | Age: 88
End: 2023-01-31
Payer: MEDICARE

## 2023-01-31 VITALS
SYSTOLIC BLOOD PRESSURE: 132 MMHG | OXYGEN SATURATION: 98 % | HEIGHT: 66 IN | HEART RATE: 78 BPM | WEIGHT: 249.81 LBS | BODY MASS INDEX: 40.15 KG/M2 | DIASTOLIC BLOOD PRESSURE: 70 MMHG

## 2023-01-31 DIAGNOSIS — R05.8 ALLERGIC COUGH: Primary | ICD-10-CM

## 2023-01-31 PROCEDURE — 99214 OFFICE O/P EST MOD 30 MIN: CPT | Mod: S$PBB,,, | Performed by: INTERNAL MEDICINE

## 2023-01-31 PROCEDURE — 99999 PR PBB SHADOW E&M-EST. PATIENT-LVL III: CPT | Mod: PBBFAC,,, | Performed by: INTERNAL MEDICINE

## 2023-01-31 PROCEDURE — 99214 PR OFFICE/OUTPT VISIT, EST, LEVL IV, 30-39 MIN: ICD-10-PCS | Mod: S$PBB,,, | Performed by: INTERNAL MEDICINE

## 2023-01-31 PROCEDURE — 99999 PR PBB SHADOW E&M-EST. PATIENT-LVL III: ICD-10-PCS | Mod: PBBFAC,,, | Performed by: INTERNAL MEDICINE

## 2023-01-31 PROCEDURE — 99213 OFFICE O/P EST LOW 20 MIN: CPT | Mod: PBBFAC | Performed by: INTERNAL MEDICINE

## 2023-01-31 RX ORDER — PREDNISONE 20 MG/1
TABLET ORAL
Qty: 12 TABLET | Refills: 0 | Status: SHIPPED | OUTPATIENT
Start: 2023-01-31 | End: 2023-04-05

## 2023-01-31 RX ORDER — PROMETHAZINE HYDROCHLORIDE AND DEXTROMETHORPHAN HYDROBROMIDE 6.25; 15 MG/5ML; MG/5ML
5 SYRUP ORAL EVERY 6 HOURS PRN
Qty: 120 ML | Refills: 0 | Status: SHIPPED | OUTPATIENT
Start: 2023-01-31 | End: 2023-04-05

## 2023-01-31 NOTE — PROGRESS NOTES
87-year-old     Been having a persistent cough about 1 month.    He initially went urgent care January 6 for cough.  He reports the call to be a dry cough, was not congested in chest, no associated shortness of breath fever or any other symptoms.  He was prescribed doxycycline and Astelin nasal spray and afterwards she felt the situation resolved.      However the same type of cough and situation redeveloped , went urgent care January 18th was to be prescribed albuterol and Tessalon Perles.  However chest x-ray was performed he was told of having pneumonia per was told to go to emergency room.  A chest x-ray at that time did not confirm pneumonia.  Report did show interstitial findings at the left lung base consistent with atelectasis.  He was prescribed a Medrol Dosepak but he is not certain if this is been helpful.    He presents with ongoing cough.  Does not cough up phlegm.  Does not feel congested no short of breath or tightening chest.  Is no fever chills no rhinorrhea postnasal drip.  Reports no heartburn indigestion    Medical history  BPH  Hyperlipidemia  Glaucoma   Pre diabetes    Medications simvastatin, tamsulosin    Examination   Weight 249  Pulse 76   Blood pressure 132/70   Nasal mucosa is clear   Tympanic membranes normal   Oropharynx no abnormal findings   Neck no adenopathy   Chest clear breath sounds with inspiration scant wheeze with forced expiration   Heart regular rate rhythm    Impression   Chronic allergic cough    Plan   Prednisone taper over the next 8 days  Promethazine DM prescribed  Reflux precautions recommended just in case this could be LPR  If no improvement may need to consider CT scan

## 2023-03-24 ENCOUNTER — OFFICE VISIT (OUTPATIENT)
Dept: URGENT CARE | Facility: CLINIC | Age: 88
End: 2023-03-24
Payer: MEDICARE

## 2023-03-24 VITALS
OXYGEN SATURATION: 98 % | TEMPERATURE: 98 F | SYSTOLIC BLOOD PRESSURE: 144 MMHG | HEIGHT: 66 IN | HEART RATE: 74 BPM | DIASTOLIC BLOOD PRESSURE: 74 MMHG | WEIGHT: 249 LBS | BODY MASS INDEX: 40.02 KG/M2 | RESPIRATION RATE: 18 BRPM

## 2023-03-24 DIAGNOSIS — S93.491A SPRAIN OF ANTERIOR TALOFIBULAR LIGAMENT OF RIGHT ANKLE, INITIAL ENCOUNTER: ICD-10-CM

## 2023-03-24 DIAGNOSIS — Z87.448 HISTORY OF CHRONIC KIDNEY DISEASE: ICD-10-CM

## 2023-03-24 DIAGNOSIS — M25.571 ACUTE RIGHT ANKLE PAIN: Primary | ICD-10-CM

## 2023-03-24 PROCEDURE — 99214 OFFICE O/P EST MOD 30 MIN: CPT | Mod: S$GLB,,, | Performed by: PHYSICIAN ASSISTANT

## 2023-03-24 PROCEDURE — 73610 XR ANKLE COMPLETE 3 VIEW RIGHT: ICD-10-PCS | Mod: FY,RT,S$GLB, | Performed by: RADIOLOGY

## 2023-03-24 PROCEDURE — 99214 PR OFFICE/OUTPT VISIT, EST, LEVL IV, 30-39 MIN: ICD-10-PCS | Mod: S$GLB,,, | Performed by: PHYSICIAN ASSISTANT

## 2023-03-24 PROCEDURE — 73610 X-RAY EXAM OF ANKLE: CPT | Mod: FY,RT,S$GLB, | Performed by: RADIOLOGY

## 2023-03-24 RX ORDER — HYDROCODONE BITARTRATE AND ACETAMINOPHEN 5; 325 MG/1; MG/1
0.5 TABLET ORAL EVERY 12 HOURS PRN
Qty: 7 TABLET | Refills: 0 | Status: SHIPPED | OUTPATIENT
Start: 2023-03-24 | End: 2023-04-05

## 2023-03-24 RX ORDER — DICLOFENAC SODIUM 10 MG/G
2 GEL TOPICAL 3 TIMES DAILY PRN
Qty: 100 G | Refills: 0 | Status: SHIPPED | OUTPATIENT
Start: 2023-03-24 | End: 2023-04-03

## 2023-03-24 NOTE — PATIENT INSTRUCTIONS
PLEASE READ YOUR DISCHARGE INSTRUCTIONS ENTIRELY AS IT CONTAINS IMPORTANT INFORMATION.  - OTC Tylenol/anti-inflammatory as needed for pain (see below). These medications can be obtained over the counter at any local pharmacy without requiring a prescription.   OTC ORAL medications for pain reliever or fever reducing:  - Acetaminophen (tylenol 650mg every 8 hour as needed with food). Avoid tylenol if you have a history of liver disease or allergic reactions. Do not take ibuprofen if you have a history of allergic reactions, stomach bleeding ulcers, kidney disease, or if you take blood thinners.  -The patient was advised that NSAID-type medications have two very important potential side effects: gastrointestinal irritation including hemorrhage and renal injuries. patient was asked to take the medication with food and to stop if patient experiences any GI upset. I asked patient to call for vomiting, abdominal pain or black/bloody stools. The patient expresses understanding of these issues and all questions were answered.    OTC TOPICAL meds for pain reliever :  -You can apply OTC diclofenac or Volatren Gel 2-3 times daily to muscle or joints.  -You can also apply OTC lidocaine 4-5% with OR without menthol ointment 2-3 daily to muscle or joints.  -Please let one absorb before using the other. Do not use both at the same time as it may decrease efficacy. Please stop using if you develop any skin rash or irritation.  -Please wash your hands after application of these topical products.     - no operating machinery with hydrocodone as it may cause drowsiness.  Start with half of a tablet every 12 hours as needed for severe pain.  - can continue foot/ankle brace, splint or wrap as needed to help with your symptoms.  - continue heat (muscle) /ice (bone/joint) compression, rice therapy, and muscle stretches.   - Radiographs and all diagnostic testing reviewed with patient  - if no improvement or worsening symptoms, recommend  follow-up with *Podiatry or PCP for further evaluation.  Please call the number below to set up appointment; if a referral has been placed.  - Follow up with your PCP or specialty clinic as directed.  You can call [phone] or [phone] to schedule an appointment with the appropriate provider.      -You must understand that you've received an Urgent Care treatment only and that you may be released before all your medical problems are known or treated. You, the patient, will arrange for follow up care as instructed. Please arrange follow up with your primary medical clinic within 2-5 days if your signs and symptoms have not resolved or worsen.     - If your condition worsens or fails to improve we recommend that you receive another evaluation at the emergency room immediately or contact your primary medical clinic to discuss your concerns in next 2-5 days.  Strict ER versus clinic precautions given.      RED FLAGS/WARNING SYMPTOMS DISCUSSED WITH PATIENT THAT WOULD WARRANT EMERGENT MEDICAL ATTENTION. Patient aware and verbalized understanding.      RICE     Rest an injury, elevate it, and use ice and compression as directed.   RICE stands for rest, ice, compression, and elevation. These can limit pain and swelling after an injury. RICE may be recommended to help treat fractures, sprains, strains, and bruises or bumps.   Home care  The following explain the details of RICE:  Rest. Limit the use of the injured body part. This helps prevent further damage to the body part and gives it time to heal. In some cases, you may need a sling, brace, splint, or cast to help keep the body part still until it has healed.  Ice. Applying ice right after an injury helps relieve pain and swelling. Wrap a bag of ice in a thin towel. Then, place it over the injured area. Do this for 10 to 15 minutes every 3 to 4 hours. Continue for the next 1 to 3 days or until your symptoms improve. Never put ice directly on your skin or ice an  area longer than 15 minutes at a time.  Compression. Putting pressure on an injury helps reduce swelling and provides support. Wrap the injured area firmly with an elastic bandage/wrap. Make sure not to wrap the bandage too tightly or you will cut off blood flow to the injured area. If your bandage loosens, rewrap it.  Elevation. Keeping an injury raised above the level of your heart reduces swelling, pain, and throbbing. For instance, if you have a broken leg, it may help to rest your leg on several pillows when sitting or lying down. Try to keep the injured area elevated for at least 2 to 3 hours per day.  Follow-up care  Follow up with your healthcare provider, or as advised.  When to seek medical advice  Call your healthcare provider right away if any of these occur:  Fever of 100.4°F (38°C) or higher, or as directed by your healthcare provider  Increased pain or swelling in the injured body part  Injured body part becomes cold, blue, numb, or tingly  Signs of infection. These include warmth in the skin, redness, drainage, or bad smell coming from the injured body part.  Date Last Reviewed: 1/18/2016  © 8405-5566 [employer]. [address] [place] [address], [place] [address]. All rights reserved. This information is not intended as a substitute for professional medical care. Always follow your healthcare professional's instructions.

## 2023-03-24 NOTE — PROGRESS NOTES
"Subjective:       Patient ID: Grey Vance Jr. is a 87 y.o. male.    Vitals:  height is 5' 6" (1.676 m) and weight is 112.9 kg (249 lb). His oral temperature is 98.3 °F (36.8 °C). His blood pressure is 144/74 (abnormal) and his pulse is 74. His respiration is 18 and oxygen saturation is 98%.     Chief Complaint: Ankle Injury    87-year-old male with history of left hip osteoarthritis, CKD stage 3, hyperlipidemia, and BPH who presents to urgent care clinic with his son Govind for evaluation.  Reports he twisted his right ankle in the backyard earlier today and has significant pain with weight-bearing/walking.  Pain is located on right lateral ankle.  Uses support cane for ambulation.  Has taken Tylenol for his symptoms with minimal pain relief.  No other associated symptoms.\\      Medical assistant note:  This is a 87 y.o. male who presents today with a chief complaint of  right ankle pain today. Pt state he was walking in his back yard and twisted his ankle.    Ankle Injury   The incident occurred 3 to 6 hours ago. The incident occurred at home. The injury mechanism was a twisting injury. The pain is present in the right ankle. The quality of the pain is described as aching. The pain is at a severity of 6/10. The patient is experiencing no pain. The pain has been Constant since onset. Pertinent negatives include no inability to bear weight, loss of motion, loss of sensation, muscle weakness, numbness or tingling. He reports no foreign bodies present. Nothing aggravates the symptoms. He has tried nothing for the symptoms. The treatment provided no relief.     Constitution: Negative for activity change, chills, sweating, fatigue, fever and generalized weakness.   HENT:  Negative for ear pain, hearing loss, facial swelling, congestion, postnasal drip, sinus pain, sinus pressure, sore throat, trouble swallowing and voice change.    Neck: Negative for neck pain, neck stiffness and painful lymph nodes. "   Cardiovascular:  Negative for chest pain, leg swelling, palpitations, sob on exertion and passing out.   Eyes:  Negative for eye discharge, eye pain, eye redness, photophobia, vision loss, double vision, blurred vision and eyelid swelling.   Respiratory:  Negative for chest tightness, cough, sputum production, COPD, shortness of breath, wheezing and asthma.    Gastrointestinal:  Negative for abdominal pain, nausea, vomiting, diarrhea, bright red blood in stool, dark colored stools, rectal bleeding, heartburn and bowel incontinence.   Genitourinary:  Negative for dysuria, frequency, urgency, urine decreased, flank pain, bladder incontinence, hematuria and history of kidney stones.   Musculoskeletal:  Positive for trauma, joint pain, joint swelling and pain with walking. Negative for abnormal ROM of joint, muscle cramps and muscle ache.   Skin:  Negative for color change, pale, rash and wound.   Allergic/Immunologic: Negative for seasonal allergies, asthma and immunocompromised state.   Neurological:  Negative for dizziness, history of vertigo, light-headedness, passing out, facial drooping, speech difficulty, coordination disturbances, loss of balance, headaches, disorientation, altered mental status, loss of consciousness, numbness, tingling and seizures.   Hematologic/Lymphatic: Negative for swollen lymph nodes, easy bruising/bleeding and trouble clotting. Does not bruise/bleed easily.   Psychiatric/Behavioral:  Negative for altered mental status and disorientation.        Past Medical History:   Diagnosis Date    BPH with elevated PSA     Cataract     CKD (chronic kidney disease) stage 3, GFR 30-59 ml/min     Fever blister     H/O impaired glucose tolerance 4/2/2014    Hyperlipidemia     Pre-diabetes     Primary osteoarthritis of left hip 11/3/2015       Objective:      Physical Exam   Constitutional: He appears well-developed. He is cooperative.  Non-toxic appearance. He does not appear ill. No distress.    HENT:   Head: Normocephalic and atraumatic.   Ears:   Right Ear: Hearing, external ear and ear canal normal.   Left Ear: Hearing, external ear and ear canal normal.   Nose: Nose normal.   Eyes: Conjunctivae and EOM are normal. Pupils are equal, round, and reactive to light. Right eye exhibits no discharge. Left eye exhibits no discharge. Right eye exhibits normal extraocular motion. Left eye exhibits normal extraocular motion. Extraocular movement intact vision grossly intact gaze aligned appropriately   Neck: Phonation normal. Neck supple.   Cardiovascular: Normal rate and regular rhythm.   Pulmonary/Chest: Effort normal. No accessory muscle usage. No respiratory distress. He has no wheezes.   Abdominal: Normal appearance. He exhibits no distension.   Musculoskeletal:         General: Swelling, tenderness and signs of injury present. No deformity.      Right lower leg: No edema.      Left lower leg: No edema.      Comments: Moves all extremities with normal tone, strength, and ROM.    Antalgic on right foot.    Right lateral malleolus tenderness to palpation.  No ankle laxity.  No erythema, open wound, or warmth.  There is swelling present.   Neurological: no focal deficit. He is alert and at baseline. He has normal motor skills. He displays no weakness, facial symmetry, normal reflexes and no dysarthria. No cranial nerve deficit or sensory deficit. He exhibits normal muscle tone. Gait and coordination normal. Coordination normal.   Skin: Skin is warm, dry, intact, not diaphoretic and no rash. Capillary refill takes less than 2 seconds.   Psychiatric: His speech is normal and behavior is normal. Mood, affect, judgment and thought content normal.   Nursing note and vitals reviewed.        XR ANKLE COMPLETE 3 VIEW RIGHT    Result Date: 3/24/2023  EXAMINATION: XR ANKLE COMPLETE 3 VIEW RIGHT CLINICAL HISTORY: Pain in right ankle and joints of right foot TECHNIQUE: AP, lateral, and oblique images of the right ankle  were performed. COMPARISON: 03/28/2022. FINDINGS: No acute fracture or dislocation. Alignment is normal. The ankle mortise is congruent. The talar dome is intact. Joint spaces are preserved. No effusion.  There is diffuse soft tissue edema.     No acute osseous abnormality. Soft tissue edema. Electronically signed by: Ector Tobar Date:    03/24/2023 Time:    18:05     Assessment:       1. Acute right ankle pain    2. Sprain of anterior talofibular ligament of right ankle, initial encounter    3. History of chronic kidney disease          Note dictated with voice recognition software, please excuse any grammatical errors.    On exam, patient is nontoxic appearing and vitals are stable.  Patient is essentially neurovascularly intact on exam.  Symptoms consistent with above.  Clinic testing ordered:  Xrays showed no acute fracture or dislocation.    Diagnostic testing results were independently reviewed and interpreted, which were discussed in depth with patient.    Patient has history of CKD stage 3 with baseline creatinine 1.2 and EGFR 59 and this was reviewed on previous CMP 10/05/2022, 09/06/2022, and 09/01/2022.  Not a good candidate for excessive anti-inflammatory.  Has tolerated hydrocodone 5 mg in the past per [place]  from dental procedure May 2020.  Defer medical management to PCP, Dr. Maryjane Aguilera.    Right ankle sprain, attempted to be placed in walking boot but worsened his pain.  We treated his right ankle with Ace wrap instead.  Patient was prescribed short course of hydrocodone 5 mg #7 Tablets (take half tablet q.12 hours as needed for severe pain) and recommended OTC treatments for their symptoms. Patient was treated conservatively with activity modification, OTC pain reliever, muscle stretches, and Warm vs. RICE therapy.   Patient was referred to podiatry for evaluation or if they fail conservative treatment.   If symptoms do not improve/worsens, patient was referred back to PCP for continued  outpatient workup and management.     Exacerbation of acute illness/injury with risk for morbidity without treatment is significant.    Patient was instructed to return for re-evaluation for any worsening or change in current symptoms. Strict ED versus clinic precautions given in depth. Discharge and follow-up instructions given verbally/printed with the patient who expressed understanding and willingness to comply with my recommendations.  Patient verbalized understanding and agreed with the entirety of plan of care.      Plan:         Acute right ankle pain  -     XR ANKLE COMPLETE 3 VIEW RIGHT; Future; Expected date: 03/24/2023  -     Cancel: NON-PNEUMATIC WALKING BOOT FOR HOME USE  -     HYDROcodone-acetaminophen (NORCO) 5-325 mg per tablet; Take 0.5 tablets by mouth every 12 (twelve) hours as needed for Pain.  Dispense: 7 tablet; Refill: 0  -     Ambulatory referral/consult to Podiatry  -     diclofenac sodium (VOLTAREN) 1 % Gel; Apply 2 g topically 3 (three) times daily as needed (Muscle and joint pain).  Dispense: 100 g; Refill: 0    Sprain of anterior talofibular ligament of right ankle, initial encounter  -     Cancel: NON-PNEUMATIC WALKING BOOT FOR HOME USE  -     HYDROcodone-acetaminophen (NORCO) 5-325 mg per tablet; Take 0.5 tablets by mouth every 12 (twelve) hours as needed for Pain.  Dispense: 7 tablet; Refill: 0  -     Ambulatory referral/consult to Podiatry  -     diclofenac sodium (VOLTAREN) 1 % Gel; Apply 2 g topically 3 (three) times daily as needed (Muscle and joint pain).  Dispense: 100 g; Refill: 0    History of chronic kidney disease              Additional MDM:     Heart Failure Score:   COPD = No    Patient Instructions   PLEASE READ YOUR DISCHARGE INSTRUCTIONS ENTIRELY AS IT CONTAINS IMPORTANT INFORMATION.  - OTC Tylenol/anti-inflammatory as needed for pain (see below). These medications can be obtained over the counter at any local pharmacy without requiring a prescription.   OTC ORAL  medications for pain reliever or fever reducing:  - Acetaminophen (tylenol 650mg every 8 hour as needed with food). Avoid tylenol if you have a history of liver disease or allergic reactions. Do not take ibuprofen if you have a history of allergic reactions, stomach bleeding ulcers, kidney disease, or if you take blood thinners.  -The patient was advised that NSAID-type medications have two very important potential side effects: gastrointestinal irritation including hemorrhage and renal injuries. patient was asked to take the medication with food and to stop if patient experiences any GI upset. I asked patient to call for vomiting, abdominal pain or black/bloody stools. The patient expresses understanding of these issues and all questions were answered.    OTC TOPICAL meds for pain reliever :  -You can apply OTC diclofenac or Volatren Gel 2-3 times daily to muscle or joints.  -You can also apply OTC lidocaine 4-5% with OR without menthol ointment 2-3 daily to muscle or joints.  -Please let one absorb before using the other. Do not use both at the same time as it may decrease efficacy. Please stop using if you develop any skin rash or irritation.  -Please wash your hands after application of these topical products.     - no operating machinery with hydrocodone as it may cause drowsiness.  Start with half of a tablet every 12 hours as needed for severe pain.  - can continue foot/ankle brace, splint or wrap as needed to help with your symptoms.  - continue heat (muscle) /ice (bone/joint) compression, rice therapy, and muscle stretches.   - Radiographs and all diagnostic testing reviewed with patient  - if no improvement or worsening symptoms, recommend follow-up with *Podiatry or PCP for further evaluation.  Please call the number below to set up appointment; if a referral has been placed.  - Follow up with your PCP or specialty clinic as directed.  You can call [phone] or [phone] to schedule an appointment  with the appropriate provider.      -You must understand that you've received an Urgent Care treatment only and that you may be released before all your medical problems are known or treated. You, the patient, will arrange for follow up care as instructed. Please arrange follow up with your primary medical clinic within 2-5 days if your signs and symptoms have not resolved or worsen.     - If your condition worsens or fails to improve we recommend that you receive another evaluation at the emergency room immediately or contact your primary medical clinic to discuss your concerns in next 2-5 days.  Strict ER versus clinic precautions given.      RED FLAGS/WARNING SYMPTOMS DISCUSSED WITH PATIENT THAT WOULD WARRANT EMERGENT MEDICAL ATTENTION. Patient aware and verbalized understanding.      RICE     Rest an injury, elevate it, and use ice and compression as directed.   RICE stands for rest, ice, compression, and elevation. These can limit pain and swelling after an injury. RICE may be recommended to help treat fractures, sprains, strains, and bruises or bumps.   Home care  The following explain the details of RICE:  Rest. Limit the use of the injured body part. This helps prevent further damage to the body part and gives it time to heal. In some cases, you may need a sling, brace, splint, or cast to help keep the body part still until it has healed.  Ice. Applying ice right after an injury helps relieve pain and swelling. Wrap a bag of ice in a thin towel. Then, place it over the injured area. Do this for 10 to 15 minutes every 3 to 4 hours. Continue for the next 1 to 3 days or until your symptoms improve. Never put ice directly on your skin or ice an area longer than 15 minutes at a time.  Compression. Putting pressure on an injury helps reduce swelling and provides support. Wrap the injured area firmly with an elastic bandage/wrap. Make sure not to wrap the bandage too tightly or you will cut off blood flow to the  injured area. If your bandage loosens, rewrap it.  Elevation. Keeping an injury raised above the level of your heart reduces swelling, pain, and throbbing. For instance, if you have a broken leg, it may help to rest your leg on several pillows when sitting or lying down. Try to keep the injured area elevated for at least 2 to 3 hours per day.  Follow-up care  Follow up with your healthcare provider, or as advised.  When to seek medical advice  Call your healthcare provider right away if any of these occur:  Fever of 100.4°F (38°C) or higher, or as directed by your healthcare provider  Increased pain or swelling in the injured body part  Injured body part becomes cold, blue, numb, or tingly  Signs of infection. These include warmth in the skin, redness, drainage, or bad smell coming from the injured body part.  Date Last Reviewed: 1/18/2016  © 8915-3201 [employer]. [address] [place] [address], [place] [address]. All rights reserved. This information is not intended as a substitute for professional medical care. Always follow your healthcare professional's instructions.

## 2023-03-27 ENCOUNTER — TELEPHONE (OUTPATIENT)
Dept: ADMINISTRATIVE | Facility: HOSPITAL | Age: 88
End: 2023-03-27
Payer: MEDICARE

## 2023-04-05 ENCOUNTER — OFFICE VISIT (OUTPATIENT)
Dept: INTERNAL MEDICINE | Facility: CLINIC | Age: 88
End: 2023-04-05
Payer: MEDICARE

## 2023-04-05 VITALS
OXYGEN SATURATION: 97 % | BODY MASS INDEX: 40.69 KG/M2 | HEART RATE: 73 BPM | SYSTOLIC BLOOD PRESSURE: 120 MMHG | WEIGHT: 253.19 LBS | DIASTOLIC BLOOD PRESSURE: 62 MMHG | HEIGHT: 66 IN

## 2023-04-05 DIAGNOSIS — N13.8 BENIGN PROSTATIC HYPERPLASIA WITH URINARY OBSTRUCTION: ICD-10-CM

## 2023-04-05 DIAGNOSIS — N18.30 STAGE 3 CHRONIC KIDNEY DISEASE, UNSPECIFIED WHETHER STAGE 3A OR 3B CKD: ICD-10-CM

## 2023-04-05 DIAGNOSIS — N40.1 BENIGN PROSTATIC HYPERPLASIA WITH URINARY OBSTRUCTION: ICD-10-CM

## 2023-04-05 DIAGNOSIS — R74.8 ELEVATED LIVER ENZYMES: ICD-10-CM

## 2023-04-05 DIAGNOSIS — E78.2 MIXED HYPERLIPIDEMIA: Primary | ICD-10-CM

## 2023-04-05 DIAGNOSIS — R73.03 PRE-DIABETES: ICD-10-CM

## 2023-04-05 PROCEDURE — 99999 PR PBB SHADOW E&M-EST. PATIENT-LVL IV: CPT | Mod: PBBFAC,,, | Performed by: PHYSICIAN ASSISTANT

## 2023-04-05 PROCEDURE — 99999 PR PBB SHADOW E&M-EST. PATIENT-LVL IV: ICD-10-PCS | Mod: PBBFAC,,, | Performed by: PHYSICIAN ASSISTANT

## 2023-04-05 PROCEDURE — 99214 OFFICE O/P EST MOD 30 MIN: CPT | Mod: S$PBB,,, | Performed by: PHYSICIAN ASSISTANT

## 2023-04-05 PROCEDURE — 99214 PR OFFICE/OUTPT VISIT, EST, LEVL IV, 30-39 MIN: ICD-10-PCS | Mod: S$PBB,,, | Performed by: PHYSICIAN ASSISTANT

## 2023-04-05 PROCEDURE — 99214 OFFICE O/P EST MOD 30 MIN: CPT | Mod: PBBFAC | Performed by: PHYSICIAN ASSISTANT

## 2023-04-05 NOTE — PROGRESS NOTES
Subjective:       Patient ID: Grey Vance Jr. is a 87 y.o. male.        Chief Complaint: Follow-up    Grey Vance Jr. is an established patient of Maryjane Adkins MD here today for 6 month f/u visit.    Doing well, no complaints.    Prediabetes -   Lab Results       Component                Value               Date                       HGBA1C                   5.8 (H)             10/05/2022                 HGBA1C                   6.0 (H)             04/05/2022                 HGBA1C                   5.6                 10/11/2021              H/o elevated liver enzymes and CK thought to be due to influenza A, ~ 9/1/22, all repeat labs normalized    CKD 3    Lipids -   Zocor 40 mg daily    Follows with his ophthalmologist regularly            Review of Systems   Constitutional:  Negative for appetite change, chills, fatigue and fever.   HENT:  Negative for congestion and sore throat.    Eyes:  Negative for visual disturbance.   Respiratory:  Negative for cough, chest tightness and shortness of breath.    Cardiovascular:  Negative for chest pain, palpitations and leg swelling.   Gastrointestinal:  Negative for abdominal pain, blood in stool, constipation, diarrhea, nausea and vomiting.   Genitourinary:  Negative for dysuria, frequency, hematuria and urgency.   Musculoskeletal:  Negative for arthralgias and back pain.   Skin:  Negative for rash.   Neurological:  Negative for dizziness, syncope, weakness and headaches.   Psychiatric/Behavioral:  Negative for dysphoric mood and sleep disturbance. The patient is not nervous/anxious.      Objective:      Physical Exam  Vitals and nursing note reviewed.   Constitutional:       Appearance: He is well-developed.   HENT:      Head: Normocephalic.      Right Ear: External ear normal.      Left Ear: External ear normal.   Eyes:      Pupils: Pupils are equal, round, and reactive to light.   Cardiovascular:      Rate and Rhythm: Normal rate and regular rhythm.      " Heart sounds: Normal heart sounds. No murmur heard.    No friction rub. No gallop.   Pulmonary:      Effort: Pulmonary effort is normal. No respiratory distress.      Breath sounds: Normal breath sounds.   Abdominal:      Palpations: Abdomen is soft.      Tenderness: There is no abdominal tenderness.   Musculoskeletal:         General: No swelling.   Skin:     General: Skin is warm and dry.   Neurological:      General: No focal deficit present.      Mental Status: He is alert.   Psychiatric:         Mood and Affect: Mood normal.       Assessment:       1. Mixed hyperlipidemia    2. Stage 3 chronic kidney disease, unspecified whether stage 3a or 3b CKD    3. Pre-diabetes    4. Elevated liver enzymes    5. BPH (benign prostatic hypertrophy) with urinary obstruction    6. BMI 39.0-39.9,adult        Plan:       Grey was seen today for follow-up.    Diagnoses and all orders for this visit:    Mixed hyperlipidemia - stable and controlled, continue current regimen  -     Lipid Panel; Future    Stage 3 chronic kidney disease, unspecified whether stage 3a or 3b CKD  -     CBC Auto Differential; Future  -     Comprehensive Metabolic Panel; Future    Pre-diabetes - work on weight loss, exercise, healthy diet  -     Hemoglobin A1C; Future    Elevated liver enzymes  -     Comprehensive Metabolic Panel; Future    BPH (benign prostatic hypertrophy) with urinary obstruction - stable and controlled, continue current regimen    BMI 39.0-39.9,adult - work on weight loss, exercise, healthy diet    F/u with Dr. Adkins in 6 months    Pt has been given instructions populated from patient instructions database and has verbalized understanding of the after visit summary and information contained wherein.    Follow up with a primary care provider. May go to ER for acute shortness of breath, lightheadedness, fever, or any other emergent complaints or changes in condition.    "This note will be shared with the patient"    Future " Appointments   Date Time Provider Department Center   4/6/2023  8:30 AM LAB, APPOINTMENT [facility]

## 2023-04-06 ENCOUNTER — LAB VISIT (OUTPATIENT)
Dept: LAB | Facility: HOSPITAL | Age: 88
End: 2023-04-06
Payer: MEDICARE

## 2023-04-06 ENCOUNTER — TELEPHONE (OUTPATIENT)
Dept: INTERNAL MEDICINE | Facility: CLINIC | Age: 88
End: 2023-04-06
Payer: MEDICARE

## 2023-04-06 DIAGNOSIS — E78.2 MIXED HYPERLIPIDEMIA: ICD-10-CM

## 2023-04-06 DIAGNOSIS — R74.8 ELEVATED LIVER ENZYMES: ICD-10-CM

## 2023-04-06 DIAGNOSIS — N18.30 STAGE 3 CHRONIC KIDNEY DISEASE, UNSPECIFIED WHETHER STAGE 3A OR 3B CKD: ICD-10-CM

## 2023-04-06 DIAGNOSIS — R73.03 PRE-DIABETES: ICD-10-CM

## 2023-04-06 LAB
ALBUMIN SERPL BCP-MCNC: 3.8 G/DL (ref 3.5–5.2)
ALP SERPL-CCNC: 65 U/L (ref 55–135)
ALT SERPL W/O P-5'-P-CCNC: 28 U/L (ref 10–44)
ANION GAP SERPL CALC-SCNC: 8 MMOL/L (ref 8–16)
AST SERPL-CCNC: 22 U/L (ref 10–40)
BASOPHILS # BLD AUTO: 0.05 K/UL (ref 0–0.2)
BASOPHILS NFR BLD: 0.7 % (ref 0–1.9)
BILIRUB SERPL-MCNC: 0.6 MG/DL (ref 0.1–1)
BUN SERPL-MCNC: 17 MG/DL (ref 8–23)
CALCIUM SERPL-MCNC: 9.6 MG/DL (ref 8.7–10.5)
CHLORIDE SERPL-SCNC: 105 MMOL/L (ref 95–110)
CHOLEST SERPL-MCNC: 147 MG/DL (ref 120–199)
CHOLEST/HDLC SERPL: 3.6 {RATIO} (ref 2–5)
CO2 SERPL-SCNC: 24 MMOL/L (ref 23–29)
CREAT SERPL-MCNC: 1.4 MG/DL (ref 0.5–1.4)
DIFFERENTIAL METHOD: ABNORMAL
EOSINOPHIL # BLD AUTO: 0.4 K/UL (ref 0–0.5)
EOSINOPHIL NFR BLD: 5.7 % (ref 0–8)
ERYTHROCYTE [DISTWIDTH] IN BLOOD BY AUTOMATED COUNT: 13.5 % (ref 11.5–14.5)
EST. GFR  (NO RACE VARIABLE): 49.3 ML/MIN/1.73 M^2
ESTIMATED AVG GLUCOSE: 120 MG/DL (ref 68–131)
GLUCOSE SERPL-MCNC: 112 MG/DL (ref 70–110)
HBA1C MFR BLD: 5.8 % (ref 4–5.6)
HCT VFR BLD AUTO: 41.2 % (ref 40–54)
HDLC SERPL-MCNC: 41 MG/DL (ref 40–75)
HDLC SERPL: 27.9 % (ref 20–50)
HGB BLD-MCNC: 13.6 G/DL (ref 14–18)
IMM GRANULOCYTES # BLD AUTO: 0.02 K/UL (ref 0–0.04)
IMM GRANULOCYTES NFR BLD AUTO: 0.3 % (ref 0–0.5)
LDLC SERPL CALC-MCNC: 90.4 MG/DL (ref 63–159)
LYMPHOCYTES # BLD AUTO: 1.8 K/UL (ref 1–4.8)
LYMPHOCYTES NFR BLD: 24.8 % (ref 18–48)
MCH RBC QN AUTO: 30.8 PG (ref 27–31)
MCHC RBC AUTO-ENTMCNC: 33 G/DL (ref 32–36)
MCV RBC AUTO: 93 FL (ref 82–98)
MONOCYTES # BLD AUTO: 0.5 K/UL (ref 0.3–1)
MONOCYTES NFR BLD: 6.6 % (ref 4–15)
NEUTROPHILS # BLD AUTO: 4.4 K/UL (ref 1.8–7.7)
NEUTROPHILS NFR BLD: 61.9 % (ref 38–73)
NONHDLC SERPL-MCNC: 106 MG/DL
NRBC BLD-RTO: 0 /100 WBC
PLATELET # BLD AUTO: 301 K/UL (ref 150–450)
PMV BLD AUTO: 9.8 FL (ref 9.2–12.9)
POTASSIUM SERPL-SCNC: 4.7 MMOL/L (ref 3.5–5.1)
PROT SERPL-MCNC: 7.1 G/DL (ref 6–8.4)
RBC # BLD AUTO: 4.42 M/UL (ref 4.6–6.2)
SODIUM SERPL-SCNC: 137 MMOL/L (ref 136–145)
TRIGL SERPL-MCNC: 78 MG/DL (ref 30–150)
WBC # BLD AUTO: 7.17 K/UL (ref 3.9–12.7)

## 2023-04-06 PROCEDURE — 83036 HEMOGLOBIN GLYCOSYLATED A1C: CPT | Performed by: PHYSICIAN ASSISTANT

## 2023-04-06 PROCEDURE — 36415 COLL VENOUS BLD VENIPUNCTURE: CPT | Performed by: PHYSICIAN ASSISTANT

## 2023-04-06 PROCEDURE — 80053 COMPREHEN METABOLIC PANEL: CPT | Performed by: PHYSICIAN ASSISTANT

## 2023-04-06 PROCEDURE — 80061 LIPID PANEL: CPT | Performed by: PHYSICIAN ASSISTANT

## 2023-04-06 PROCEDURE — 85025 COMPLETE CBC W/AUTO DIFF WBC: CPT | Performed by: PHYSICIAN ASSISTANT

## 2023-04-06 NOTE — TELEPHONE ENCOUNTER
----- Message from Mariana Bhatti PA-C sent at 4/5/2023 12:05 PM CDT -----  Please schedule patient 6 month f/u - not completed at time of visit

## 2023-05-03 DIAGNOSIS — Z71.89 COMPLEX CARE COORDINATION: ICD-10-CM

## 2023-07-10 ENCOUNTER — PATIENT OUTREACH (OUTPATIENT)
Dept: ADMINISTRATIVE | Facility: HOSPITAL | Age: 88
End: 2023-07-10
Payer: MEDICARE

## 2023-07-10 ENCOUNTER — PATIENT MESSAGE (OUTPATIENT)
Dept: ADMINISTRATIVE | Facility: HOSPITAL | Age: 88
End: 2023-07-10
Payer: MEDICARE

## 2023-07-10 NOTE — PROGRESS NOTES
Health Maintenance Due   Topic Date Due    Shingles Vaccine (3 of 3) 05/31/2022    COVID-19 Vaccine (6 - Moderna series) 02/05/2023     HM updated. Triggered LINKS and Care everywhere. Immunizations reconciled. Outreached regarding OPCM.    Eduarda Abreu LPN   Clinical Care Coordinator  Primary Care and Wellness

## 2023-07-13 ENCOUNTER — PATIENT OUTREACH (OUTPATIENT)
Dept: ADMINISTRATIVE | Facility: HOSPITAL | Age: 88
End: 2023-07-13
Payer: MEDICARE

## 2023-07-14 ENCOUNTER — TELEPHONE (OUTPATIENT)
Dept: ADMINISTRATIVE | Facility: HOSPITAL | Age: 88
End: 2023-07-14
Payer: MEDICARE

## 2023-07-14 NOTE — TELEPHONE ENCOUNTER
Follow up call regarding OPCM program. No answer.    Eduarda Abreu LPN   Clinical Care Coordinator  Primary Care and Wellness

## 2023-07-21 ENCOUNTER — PES CALL (OUTPATIENT)
Dept: ADMINISTRATIVE | Facility: CLINIC | Age: 88
End: 2023-07-21
Payer: MEDICARE

## 2023-08-24 ENCOUNTER — OFFICE VISIT (OUTPATIENT)
Dept: INTERNAL MEDICINE | Facility: CLINIC | Age: 88
End: 2023-08-24
Payer: MEDICARE

## 2023-08-24 ENCOUNTER — OFFICE VISIT (OUTPATIENT)
Dept: OTOLARYNGOLOGY | Facility: CLINIC | Age: 88
End: 2023-08-24
Payer: MEDICARE

## 2023-08-24 VITALS
WEIGHT: 253 LBS | SYSTOLIC BLOOD PRESSURE: 126 MMHG | HEIGHT: 66 IN | BODY MASS INDEX: 40.66 KG/M2 | DIASTOLIC BLOOD PRESSURE: 78 MMHG

## 2023-08-24 VITALS
HEIGHT: 66 IN | SYSTOLIC BLOOD PRESSURE: 132 MMHG | WEIGHT: 253.88 LBS | RESPIRATION RATE: 14 BRPM | BODY MASS INDEX: 40.8 KG/M2 | DIASTOLIC BLOOD PRESSURE: 78 MMHG | HEART RATE: 65 BPM

## 2023-08-24 DIAGNOSIS — E55.9 MILD VITAMIN D DEFICIENCY: ICD-10-CM

## 2023-08-24 DIAGNOSIS — E66.01 MORBID OBESITY WITH BODY MASS INDEX (BMI) OF 40.0 OR HIGHER: ICD-10-CM

## 2023-08-24 DIAGNOSIS — Z12.83 SCREENING EXAM FOR SKIN CANCER: ICD-10-CM

## 2023-08-24 DIAGNOSIS — H61.23 BILATERAL IMPACTED CERUMEN: ICD-10-CM

## 2023-08-24 DIAGNOSIS — N13.8 BENIGN PROSTATIC HYPERPLASIA WITH URINARY OBSTRUCTION: ICD-10-CM

## 2023-08-24 DIAGNOSIS — R73.03 PRE-DIABETES: ICD-10-CM

## 2023-08-24 DIAGNOSIS — E78.5 HYPERLIPIDEMIA, UNSPECIFIED HYPERLIPIDEMIA TYPE: ICD-10-CM

## 2023-08-24 DIAGNOSIS — N40.1 BENIGN PROSTATIC HYPERPLASIA WITH URINARY OBSTRUCTION: ICD-10-CM

## 2023-08-24 DIAGNOSIS — N18.30 STAGE 3 CHRONIC KIDNEY DISEASE, UNSPECIFIED WHETHER STAGE 3A OR 3B CKD: ICD-10-CM

## 2023-08-24 DIAGNOSIS — I70.0 AORTIC ARCH ATHEROSCLEROSIS: ICD-10-CM

## 2023-08-24 DIAGNOSIS — Z00.00 ENCOUNTER FOR PREVENTIVE HEALTH EXAMINATION: Primary | ICD-10-CM

## 2023-08-24 PROBLEM — R53.1 DECREASED STRENGTH: Status: RESOLVED | Noted: 2021-05-11 | Resolved: 2023-08-24

## 2023-08-24 PROCEDURE — 99999 PR PBB SHADOW E&M-EST. PATIENT-LVL V: CPT | Mod: PBBFAC,,, | Performed by: NURSE PRACTITIONER

## 2023-08-24 PROCEDURE — G0439 PPPS, SUBSEQ VISIT: HCPCS | Mod: ,,, | Performed by: NURSE PRACTITIONER

## 2023-08-24 PROCEDURE — 99499 UNLISTED E&M SERVICE: CPT | Mod: S$GLB,,, | Performed by: NURSE PRACTITIONER

## 2023-08-24 PROCEDURE — 99999 PR PBB SHADOW E&M-EST. PATIENT-LVL V: ICD-10-PCS | Mod: PBBFAC,,, | Performed by: NURSE PRACTITIONER

## 2023-08-24 PROCEDURE — 99499 NO LOS: ICD-10-PCS | Mod: S$GLB,,, | Performed by: NURSE PRACTITIONER

## 2023-08-24 PROCEDURE — G0439 PR MEDICARE ANNUAL WELLNESS SUBSEQUENT VISIT: ICD-10-PCS | Mod: ,,, | Performed by: NURSE PRACTITIONER

## 2023-08-24 PROCEDURE — 69210 PR REMOVAL IMPACTED CERUMEN REQUIRING INSTRUMENTATION, UNILATERAL: ICD-10-PCS | Mod: S$GLB,,, | Performed by: NURSE PRACTITIONER

## 2023-08-24 PROCEDURE — 99215 OFFICE O/P EST HI 40 MIN: CPT | Mod: PBBFAC,PO | Performed by: NURSE PRACTITIONER

## 2023-08-24 PROCEDURE — 69210 REMOVE IMPACTED EAR WAX UNI: CPT | Mod: S$GLB,,, | Performed by: NURSE PRACTITIONER

## 2023-08-24 NOTE — PROGRESS NOTES
"Grey Vance presented for a  Medicare AWV and comprehensive Health Risk Assessment today. The following components were reviewed and updated:    Medical history  Family History  Social history  Allergies and Current Medications  Health Risk Assessment  Health Maintenance  Care Team     ** See Completed Assessments for Annual Wellness Visit within the encounter summary.**       The following assessments were completed:  Living Situation  CAGE  Depression Screening  Timed Get Up and Go  Whisper Test-aides  Cognitive Function Screening  Nutrition Screening  ADL Screening  PAQ Screening      Vitals:    08/24/23 0941   BP: 132/78   BP Location: Right arm   Patient Position: Sitting   Pulse: 65   Resp: 14   Weight: 115.2 kg (253 lb 13.8 oz)   Height: 5' 6" (1.676 m)     Body mass index is 40.97 kg/m².  Physical Exam  Constitutional:       Comments: Younger in appearance than age   HENT:      Right Ear: There is impacted cerumen.      Left Ear: There is impacted cerumen.      Ears:      Comments: aides  Eyes:      General: No scleral icterus.  Cardiovascular:      Rate and Rhythm: Normal rate and regular rhythm.   Pulmonary:      Effort: Pulmonary effort is normal.      Breath sounds: Normal breath sounds.   Abdominal:      Palpations: Abdomen is soft.      Comments: protrudent   Musculoskeletal:         General: No swelling. Normal range of motion.   Skin:     General: Skin is warm and dry.   Neurological:      Mental Status: He is alert and oriented to person, place, and time.   Psychiatric:         Mood and Affect: Mood normal.         Behavior: Behavior normal.         Thought Content: Thought content normal.         Judgment: Judgment normal.            Medication review & Opioid screening performed during rooming section. No prescribed opioid medications noted.  Review for substance use disorder screening performed during rooming section. No substance abuse noted on screening.      Diagnoses and health risks " identified today and associated recommendations/orders:    1. Encounter for preventive health examination  Here for Health Risk Assessment/Annual Wellness Visit.  Health maintenance reviewed and updated. Follow up in one year.        2. Aortic arch atherosclerosis  Stable on regimen & followed by PCP    3. Pre-diabetes  Stable on regimen & followed by PCP    4. Mild vitamin D deficiency  Stable on regimen & followed by PCP    5. Stage 3 chronic kidney disease, unspecified whether stage 3a or 3b CKD  Stable on regimen & followed by PCP    6. Hyperlipidemia, unspecified hyperlipidemia type  Stable on regimen & followed by PCP    7. BPH (benign prostatic hypertrophy) with urinary obstruction  Stable on regimen & followed by PCP    8. Morbid obesity with body mass index (BMI) of 40.0 or higher  Chronic . Followed by PCP.   Centers for Disease Control and Prevention (CDC)  weight recommendations for current BMI & ideal BMI range discussed with patient.  Recommended  gradual weight loss,  mediterranean diet , structured regular exercise every day.       9. Screening exam for skin cancer  - Ambulatory referral/consult to Dermatology; Future    10. Bilateral impacted cerumen  - Ambulatory referral/consult to ENT; Future      Provided Grey with a 5-10 year written screening schedule and personal prevention plan. Recommendations were developed using the USPSTF age appropriate recommendations. Education, counseling, and referrals were provided as needed. After Visit Summary printed and given to patient which includes a list of additional screenings\tests needed. ENT-ear wax removal.Gradual weight loss, mediterranean diet, exercise.Fall prevention handouts  Follow up in about 1 year (around 8/24/2024) for HRA.    AZUL Contreras offered to discuss advanced care planning, including how to pick a person who would make decisions for you if you were unable to make them for yourself, called a health care power of ,  and what kind of decisions you might make such as use of life sustaining treatments such as ventilators and tube feeding when faced with a life limiting illness recorded on a living will that they will need to know. (How you want to be cared for as you near the end of your natural life)     X  Patient has advanced directives on file, which we reviewed, and they do not wish to make changes.

## 2023-08-24 NOTE — PATIENT INSTRUCTIONS
Counseling and Referral of Other Preventative  (Italic type indicates deductible and co-insurance are waived)    Patient Name: Grey Vance  Today's Date: 8/24/2023    Health Maintenance         Date Due Completion Date    Shingles Vaccine (3 of 3) Check w PCP if you received 2nd shot     4/5/2022    COVID-19 Vaccine (6 - Moderna series) 10/05/2023   10/5/2022    Influenza Vaccine (1) 09/01/2023 10/5/2022    Hemoglobin A1c (Prediabetes) 04/06/2024 4/6/2023    TETANUS VACCINE 12/01/2027 12/1/2017    Lipid Panel    ENT-ear wax removal    Gradual weight loss, mediterranean diet, exercise    Fall prevention handouts     04/06/2024 4/6/2023          No orders of the defined types were placed in this encounter.      The following information is provided to all patients.  This information is to help you find resources for any of the problems found today that may be affecting your health:                Living healthy guide: www.[email or web address].[email or web address]      Understanding Diabetes: www.diabetes.org      Eating healthy: www.cdc.gov/healthyweight      [facility] home safety checklist: www.cdc.gov/steadi/patient.html      Agency on Aging: www.goea.[place].[facility]      Alcoholics anonymous (AA): www.aa.org      Physical Activity: www.ajy jay.nih.gov/ny8ndii      Tobacco use: www.quitwithusla.org

## 2023-08-25 NOTE — PROGRESS NOTES
Procedure Note   Procedure performed:microscopic examination of ears with cerumen disimpaction    Indication for procedure: bilateral cerumen impaction     Description of procedure:  After verbal consent was obtained, the patient was positioned in semi recumbent position and speculum was placed in the right ear and microscope brought into the field.  The microscope was positioned and magnification adjusted for appropriate visualization. Otologic instruments including various size otologic suctions and curette were used to remove the cerumen from the right external auditory canals under visualization with the operating microscope. After cleaning, visualization was again performed and at various levels of higher magnification to optimize views of the ear canal, tympanic membrane, ossicles and middle ear space. The same procedure was then repeated for the left ear. Findings as indicated below. All portions of the procedure and examination by otomicroscopy were tolerated well without complication.     Findings:  Right ear: Complete cerumen impaction removed entirely revealing normal external auditory canal; tympanic membrane without bulging, retraction, or perforation; no evidence of middle ear fluid or effusion.   Left ear: Complete cerumen impaction removed entirely revealing normal external auditory canal; tympanic membrane without bulging, retraction, or perforation; no evidence of middle ear fluid or effusion.        F/u in 1 year.

## 2023-11-05 DIAGNOSIS — N13.8 BENIGN PROSTATIC HYPERPLASIA WITH URINARY OBSTRUCTION: ICD-10-CM

## 2023-11-05 DIAGNOSIS — N40.1 BENIGN PROSTATIC HYPERPLASIA WITH URINARY OBSTRUCTION: ICD-10-CM

## 2023-11-05 NOTE — TELEPHONE ENCOUNTER
No care due was identified.  [facility] Embedded Care Due Messages. Reference number: [id number].   11/05/2023 5:45:48 PM CST

## 2023-11-06 ENCOUNTER — LAB VISIT (OUTPATIENT)
Dept: LAB | Facility: HOSPITAL | Age: 88
End: 2023-11-06
Attending: INTERNAL MEDICINE
Payer: MEDICARE

## 2023-11-06 ENCOUNTER — IMMUNIZATION (OUTPATIENT)
Dept: INTERNAL MEDICINE | Facility: CLINIC | Age: 88
End: 2023-11-06
Payer: MEDICARE

## 2023-11-06 ENCOUNTER — OFFICE VISIT (OUTPATIENT)
Dept: INTERNAL MEDICINE | Facility: CLINIC | Age: 88
End: 2023-11-06
Payer: MEDICARE

## 2023-11-06 VITALS
OXYGEN SATURATION: 95 % | HEIGHT: 66 IN | WEIGHT: 255.75 LBS | DIASTOLIC BLOOD PRESSURE: 65 MMHG | HEART RATE: 72 BPM | BODY MASS INDEX: 41.1 KG/M2 | SYSTOLIC BLOOD PRESSURE: 116 MMHG

## 2023-11-06 DIAGNOSIS — R73.03 PRE-DIABETES: ICD-10-CM

## 2023-11-06 DIAGNOSIS — E78.5 HYPERLIPIDEMIA, UNSPECIFIED HYPERLIPIDEMIA TYPE: ICD-10-CM

## 2023-11-06 DIAGNOSIS — N18.30 STAGE 3 CHRONIC KIDNEY DISEASE, UNSPECIFIED WHETHER STAGE 3A OR 3B CKD: ICD-10-CM

## 2023-11-06 DIAGNOSIS — E66.01 MORBID OBESITY WITH BODY MASS INDEX (BMI) OF 40.0 OR HIGHER: ICD-10-CM

## 2023-11-06 DIAGNOSIS — Z71.89 ADVANCED CARE PLANNING/COUNSELING DISCUSSION: ICD-10-CM

## 2023-11-06 DIAGNOSIS — I70.0 AORTIC ARCH ATHEROSCLEROSIS: ICD-10-CM

## 2023-11-06 DIAGNOSIS — N18.30 STAGE 3 CHRONIC KIDNEY DISEASE, UNSPECIFIED WHETHER STAGE 3A OR 3B CKD: Primary | ICD-10-CM

## 2023-11-06 LAB
ALBUMIN SERPL BCP-MCNC: 3.9 G/DL (ref 3.5–5.2)
ALP SERPL-CCNC: 67 U/L (ref 55–135)
ALT SERPL W/O P-5'-P-CCNC: 26 U/L (ref 10–44)
ANION GAP SERPL CALC-SCNC: 8 MMOL/L (ref 8–16)
AST SERPL-CCNC: 32 U/L (ref 10–40)
BILIRUB SERPL-MCNC: 0.5 MG/DL (ref 0.1–1)
BUN SERPL-MCNC: 17 MG/DL (ref 8–23)
CALCIUM SERPL-MCNC: 9.9 MG/DL (ref 8.7–10.5)
CHLORIDE SERPL-SCNC: 107 MMOL/L (ref 95–110)
CO2 SERPL-SCNC: 24 MMOL/L (ref 23–29)
CREAT SERPL-MCNC: 1.2 MG/DL (ref 0.5–1.4)
EST. GFR  (NO RACE VARIABLE): 58.9 ML/MIN/1.73 M^2
ESTIMATED AVG GLUCOSE: 120 MG/DL (ref 68–131)
GLUCOSE SERPL-MCNC: 79 MG/DL (ref 70–110)
HBA1C MFR BLD: 5.8 % (ref 4–5.6)
POTASSIUM SERPL-SCNC: 5 MMOL/L (ref 3.5–5.1)
PROT SERPL-MCNC: 7.8 G/DL (ref 6–8.4)
SODIUM SERPL-SCNC: 139 MMOL/L (ref 136–145)

## 2023-11-06 PROCEDURE — 83036 HEMOGLOBIN GLYCOSYLATED A1C: CPT | Performed by: INTERNAL MEDICINE

## 2023-11-06 PROCEDURE — 99999PBSHW FLU VACCINE - QUADRIVALENT - ADJUVANTED: ICD-10-PCS | Mod: PBBFAC,,,

## 2023-11-06 PROCEDURE — 99214 PR OFFICE/OUTPT VISIT, EST, LEVL IV, 30-39 MIN: ICD-10-PCS | Mod: S$PBB,,, | Performed by: INTERNAL MEDICINE

## 2023-11-06 PROCEDURE — 99999PBSHW FLU VACCINE - QUADRIVALENT - ADJUVANTED: Mod: PBBFAC,,,

## 2023-11-06 PROCEDURE — 99214 OFFICE O/P EST MOD 30 MIN: CPT | Mod: PBBFAC,25 | Performed by: INTERNAL MEDICINE

## 2023-11-06 PROCEDURE — 99999 PR PBB SHADOW E&M-EST. PATIENT-LVL IV: ICD-10-PCS | Mod: PBBFAC,,, | Performed by: INTERNAL MEDICINE

## 2023-11-06 PROCEDURE — G0008 ADMIN INFLUENZA VIRUS VAC: HCPCS | Mod: PBBFAC

## 2023-11-06 PROCEDURE — 36415 COLL VENOUS BLD VENIPUNCTURE: CPT | Performed by: INTERNAL MEDICINE

## 2023-11-06 PROCEDURE — 99999 PR PBB SHADOW E&M-EST. PATIENT-LVL IV: CPT | Mod: PBBFAC,,, | Performed by: INTERNAL MEDICINE

## 2023-11-06 PROCEDURE — 80053 COMPREHEN METABOLIC PANEL: CPT | Performed by: INTERNAL MEDICINE

## 2023-11-06 PROCEDURE — 99214 OFFICE O/P EST MOD 30 MIN: CPT | Mod: S$PBB,,, | Performed by: INTERNAL MEDICINE

## 2023-11-06 RX ORDER — TAMSULOSIN HYDROCHLORIDE 0.4 MG/1
CAPSULE ORAL
Qty: 90 CAPSULE | Refills: 3 | Status: SHIPPED | OUTPATIENT
Start: 2023-11-06

## 2023-11-06 RX ORDER — SIMVASTATIN 40 MG/1
TABLET, FILM COATED ORAL
Qty: 45 TABLET | Refills: 4 | Status: SHIPPED | OUTPATIENT
Start: 2023-11-06

## 2023-11-06 NOTE — PROGRESS NOTES
"Subjective:       Patient ID: Grey Vance Jr. is a 88 y.o. male.    Chief Complaint: Follow-up    HPI  88 y.o. male here for follow up of    Prediabetes  Lab Results   Component Value Date    HGBA1C 5.8 (H) 11/06/2023    HGBA1C 5.8 (H) 04/06/2023    HGBA1C 5.8 (H) 10/05/2022     Liver enzymes previously elevated but have normalized.     CKD3  April '23 gfr 49    HLD  Simvastatin 20mg    Lives alone since wife passed.   Has a  who comes every other week. His neighbors do a lot of cooking for him and his son brings him food. He can cook when needed. No falls. Some dizziness if turns too quickly.   Stand up shower - no issues.  He drives without issue.   Review of Systems   Constitutional:  Negative for fever.   Respiratory:  Negative for shortness of breath.    Cardiovascular:  Negative for chest pain.   Musculoskeletal: Negative.    Skin: Negative.        Objective:   /65 (BP Location: Right arm, Patient Position: Sitting, BP Method: Medium (Manual))   Pulse 72   Ht 5' 6" (1.676 m)   Wt 116 kg (255 lb 11.7 oz)   SpO2 95%   BMI 41.28 kg/m²      Physical Exam  Constitutional:       General: He is not in acute distress.     Appearance: He is well-developed. He is not diaphoretic.   HENT:      Head: Normocephalic and atraumatic.   Cardiovascular:      Rate and Rhythm: Normal rate and regular rhythm.      Heart sounds: No murmur heard.  Pulmonary:      Effort: Pulmonary effort is normal. No respiratory distress.      Breath sounds: No wheezing or rales.   Skin:     General: Skin is warm and dry.   Neurological:      Mental Status: He is alert and oriented to person, place, and time.   Psychiatric:         Behavior: Behavior normal.         Assessment:       1. Stage 3 chronic kidney disease, unspecified whether stage 3a or 3b CKD    2. Hyperlipidemia    3. Morbid obesity with body mass index (BMI) of 40.0 or higher    4. Aortic arch atherosclerosis    5. Pre-diabetes    6. Advanced care " planning/counseling discussion        Plan:       1. Stage 3 chronic kidney disease, unspecified whether stage 3a or 3b CKD  -     COMPREHENSIVE METABOLIC PANEL; Future; Expected date: 11/06/2023    2. Hyperlipidemia  -     simvastatin (ZOCOR) 40 MG tablet; TAKE 1/2 TABLET(20 MG) BY MOUTH EVERY EVENING  Dispense: 45 tablet; Refill: 4    3. Morbid obesity with body mass index (BMI) of 40.0 or higher  Stable, monitor    4. Aortic arch atherosclerosis  - stable and controlled  - continue current regimen    5. Pre-diabetes  -     Hemoglobin A1C; Future; Expected date: 11/06/2023    6. Advanced care planning/counseling discussion  His sons have healthcare POA, documents on file (HERMINIA Faust, and Jitendra)  He discussed that he would not want his life artificially prolonged if little hope of recovery and has shared these wishes with his sons.          You are up to date for your primary preventive health care, and there are no reminders at this time.     Flu, rsv, covid vaccines recommended

## 2023-12-03 DIAGNOSIS — Z71.89 COMPLEX CARE COORDINATION: ICD-10-CM

## 2023-12-22 ENCOUNTER — OFFICE VISIT (OUTPATIENT)
Dept: DERMATOLOGY | Facility: CLINIC | Age: 88
End: 2023-12-22
Payer: MEDICARE

## 2023-12-22 DIAGNOSIS — L40.9 PSORIASIS: Primary | ICD-10-CM

## 2023-12-22 DIAGNOSIS — Z12.83 SCREENING EXAM FOR SKIN CANCER: ICD-10-CM

## 2023-12-22 DIAGNOSIS — L72.0 EPIDERMAL INCLUSION CYST: ICD-10-CM

## 2023-12-22 DIAGNOSIS — L82.1 SEBORRHEIC KERATOSES: ICD-10-CM

## 2023-12-22 PROCEDURE — 99213 OFFICE O/P EST LOW 20 MIN: CPT | Mod: PBBFAC | Performed by: STUDENT IN AN ORGANIZED HEALTH CARE EDUCATION/TRAINING PROGRAM

## 2023-12-22 PROCEDURE — 99204 PR OFFICE/OUTPT VISIT, NEW, LEVL IV, 45-59 MIN: ICD-10-PCS | Mod: S$PBB,,, | Performed by: STUDENT IN AN ORGANIZED HEALTH CARE EDUCATION/TRAINING PROGRAM

## 2023-12-22 PROCEDURE — 99999 PR PBB SHADOW E&M-EST. PATIENT-LVL III: ICD-10-PCS | Mod: PBBFAC,,, | Performed by: STUDENT IN AN ORGANIZED HEALTH CARE EDUCATION/TRAINING PROGRAM

## 2023-12-22 PROCEDURE — 99999 PR PBB SHADOW E&M-EST. PATIENT-LVL III: CPT | Mod: PBBFAC,,, | Performed by: STUDENT IN AN ORGANIZED HEALTH CARE EDUCATION/TRAINING PROGRAM

## 2023-12-22 PROCEDURE — 99204 OFFICE O/P NEW MOD 45 MIN: CPT | Mod: S$PBB,,, | Performed by: STUDENT IN AN ORGANIZED HEALTH CARE EDUCATION/TRAINING PROGRAM

## 2023-12-22 RX ORDER — CLOBETASOL PROPIONATE 0.5 MG/G
OINTMENT TOPICAL
Qty: 60 G | Refills: 1 | Status: SHIPPED | OUTPATIENT
Start: 2023-12-22

## 2023-12-22 RX ORDER — DORZOLAMIDE HYDROCHLORIDE AND TIMOLOL MALEATE 20; 5 MG/ML; MG/ML
1 SOLUTION/ DROPS OPHTHALMIC 2 TIMES DAILY
COMMUNITY
Start: 2023-09-27

## 2024-04-16 ENCOUNTER — OFFICE VISIT (OUTPATIENT)
Dept: INTERNAL MEDICINE | Facility: CLINIC | Age: 89
End: 2024-04-16
Payer: MEDICARE

## 2024-04-16 VITALS
WEIGHT: 259.25 LBS | BODY MASS INDEX: 41.66 KG/M2 | TEMPERATURE: 98 F | DIASTOLIC BLOOD PRESSURE: 70 MMHG | HEIGHT: 66 IN | OXYGEN SATURATION: 96 % | HEART RATE: 60 BPM | SYSTOLIC BLOOD PRESSURE: 132 MMHG

## 2024-04-16 DIAGNOSIS — E78.5 HYPERLIPIDEMIA, UNSPECIFIED HYPERLIPIDEMIA TYPE: ICD-10-CM

## 2024-04-16 DIAGNOSIS — R21 RASH AND NONSPECIFIC SKIN ERUPTION: Primary | ICD-10-CM

## 2024-04-16 DIAGNOSIS — R73.03 PRE-DIABETES: ICD-10-CM

## 2024-04-16 DIAGNOSIS — N18.30 STAGE 3 CHRONIC KIDNEY DISEASE, UNSPECIFIED WHETHER STAGE 3A OR 3B CKD: ICD-10-CM

## 2024-04-16 DIAGNOSIS — E55.9 MILD VITAMIN D DEFICIENCY: ICD-10-CM

## 2024-04-16 PROCEDURE — 99215 OFFICE O/P EST HI 40 MIN: CPT | Mod: PBBFAC | Performed by: PHYSICIAN ASSISTANT

## 2024-04-16 PROCEDURE — 99214 OFFICE O/P EST MOD 30 MIN: CPT | Mod: S$PBB,,, | Performed by: PHYSICIAN ASSISTANT

## 2024-04-16 PROCEDURE — 99999 PR PBB SHADOW E&M-EST. PATIENT-LVL V: CPT | Mod: PBBFAC,,, | Performed by: PHYSICIAN ASSISTANT

## 2024-04-16 RX ORDER — MUPIROCIN 20 MG/G
OINTMENT TOPICAL 2 TIMES DAILY
Qty: 30 G | Refills: 0 | Status: SHIPPED | OUTPATIENT
Start: 2024-04-16

## 2024-04-16 RX ORDER — DOXYCYCLINE 100 MG/1
100 CAPSULE ORAL EVERY 12 HOURS
Qty: 20 CAPSULE | Refills: 0 | Status: SHIPPED | OUTPATIENT
Start: 2024-04-16

## 2024-04-17 ENCOUNTER — OFFICE VISIT (OUTPATIENT)
Dept: DERMATOLOGY | Facility: CLINIC | Age: 89
End: 2024-04-17
Payer: MEDICARE

## 2024-04-17 VITALS — BODY MASS INDEX: 41.8 KG/M2 | WEIGHT: 259 LBS

## 2024-04-17 DIAGNOSIS — B35.3 TINEA PEDIS OF RIGHT FOOT: Primary | ICD-10-CM

## 2024-04-17 DIAGNOSIS — R21 RASH AND NONSPECIFIC SKIN ERUPTION: ICD-10-CM

## 2024-04-17 DIAGNOSIS — I87.2 STASIS DERMATITIS OF BOTH LEGS: ICD-10-CM

## 2024-04-17 PROCEDURE — 99214 OFFICE O/P EST MOD 30 MIN: CPT | Mod: PBBFAC,PO | Performed by: DERMATOLOGY

## 2024-04-17 PROCEDURE — 99214 OFFICE O/P EST MOD 30 MIN: CPT | Mod: S$PBB,,, | Performed by: DERMATOLOGY

## 2024-04-17 PROCEDURE — 87070 CULTURE OTHR SPECIMN AEROBIC: CPT | Performed by: DERMATOLOGY

## 2024-04-17 PROCEDURE — 99999 PR PBB SHADOW E&M-EST. PATIENT-LVL IV: CPT | Mod: PBBFAC,,, | Performed by: DERMATOLOGY

## 2024-04-17 RX ORDER — ECONAZOLE NITRATE 10 MG/G
CREAM TOPICAL
Qty: 30 G | Refills: 2 | Status: SHIPPED | OUTPATIENT
Start: 2024-04-17

## 2024-04-17 NOTE — PROGRESS NOTES
Subjective:      Patient ID:  Grey Vance Jr. is a 88 y.o. male who presents for   Chief Complaint   Patient presents with    Rash     Right lower leg,     Rash and pruritus of right lower leg and sole of right foot for over a year no tx.     Rash - Initial  Affected locations: right lower leg  Signs / symptoms: asymptomatic      Review of Systems   Constitutional:  Negative for fever, chills, weight loss, weight gain, fatigue, night sweats and malaise.   Skin:  Positive for rash and activity-related sunscreen use. Negative for daily sunscreen use and wears hat.   Hematologic/Lymphatic: Bruises/bleeds easily.       Objective:   Physical Exam   Constitutional: He appears well-developed and well-nourished.   Neurological: He is alert and oriented to person, place, and time.   Psychiatric: He has a normal mood and affect.   Skin:   Areas Examined (abnormalities noted in diagram):   RLE Inspected  LLE Inspection Performed  Nails and Digits Inspection Performed                Diagram Legend     Erythematous scaling macule/papule c/w actinic keratosis       Vascular papule c/w angioma      Pigmented verrucoid papule/plaque c/w seborrheic keratosis      Yellow umbilicated papule c/w sebaceous hyperplasia      Irregularly shaped tan macule c/w lentigo     1-2 mm smooth white papules consistent with Milia      Movable subcutaneous cyst with punctum c/w epidermal inclusion cyst      Subcutaneous movable cyst c/w pilar cyst      Firm pink to brown papule c/w dermatofibroma      Pedunculated fleshy papule(s) c/w skin tag(s)      Evenly pigmented macule c/w junctional nevus     Mildly variegated pigmented, slightly irregular-bordered macule c/w mildly atypical nevus      Flesh colored to evenly pigmented papule c/w intradermal nevus       Pink pearly papule/plaque c/w basal cell carcinoma      Erythematous hyperkeratotic cursted plaque c/w SCC      Surgical scar with no sign of skin cancer recurrence      Open and closed  comedones      Inflammatory papules and pustules      Verrucoid papule consistent consistent with wart     Erythematous eczematous patches and plaques     Dystrophic onycholytic nail with subungual debris c/w onychomycosis     Umbilicated papule    Erythematous-base heme-crusted tan verrucoid plaque consistent with inflamed seborrheic keratosis     Erythematous Silvery Scaling Plaque c/w Psoriasis     See annotation      Assessment / Plan:        Tinea pedis of right foot  -     econazole nitrate 1 % cream; Use bid    Rash and nonspecific skin eruption  -     Ambulatory referral/consult to Dermatology    Stasis dermatitis of both legs  Leg elevation  No hot water  -     Aerobic culture right leg  Dermeleve lotion for pruritus             Follow up if symptoms worsen or fail to improve.

## 2024-04-20 LAB — BACTERIA SPEC AEROBE CULT: NORMAL

## 2024-04-22 ENCOUNTER — PATIENT MESSAGE (OUTPATIENT)
Dept: DERMATOLOGY | Facility: CLINIC | Age: 89
End: 2024-04-22
Payer: MEDICARE

## 2024-06-10 ENCOUNTER — PATIENT MESSAGE (OUTPATIENT)
Dept: INTERNAL MEDICINE | Facility: CLINIC | Age: 89
End: 2024-06-10
Payer: MEDICARE

## 2024-07-03 DIAGNOSIS — Z71.89 COMPLEX CARE COORDINATION: ICD-10-CM

## 2024-07-12 ENCOUNTER — HOSPITAL ENCOUNTER (EMERGENCY)
Facility: HOSPITAL | Age: 89
Discharge: HOME | End: 2024-07-12
Attending: EMERGENCY MEDICINE
Payer: MEDICARE

## 2024-07-12 VITALS
BODY MASS INDEX: 41.8 KG/M2 | DIASTOLIC BLOOD PRESSURE: 78 MMHG | RESPIRATION RATE: 20 BRPM | SYSTOLIC BLOOD PRESSURE: 158 MMHG | TEMPERATURE: 99 F | HEART RATE: 90 BPM | OXYGEN SATURATION: 100 % | WEIGHT: 259 LBS

## 2024-07-12 DIAGNOSIS — Z74.1: Primary | ICD-10-CM
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
## 2024-07-12 LAB
BACTERIA #/AREA URNS AUTO: ABNORMAL /HPF
BASOPHILS # BLD AUTO: 0.03 K/UL (ref 0–0.2)
BASOPHILS NFR BLD: 0.4 % (ref 0–1.9)
BILIRUB UR QL STRIP: NEGATIVE
CLARITY UR REFRACT.AUTO: CLEAR
COLOR UR AUTO: YELLOW
DIFFERENTIAL METHOD BLD: ABNORMAL
EOSINOPHIL # BLD AUTO: 0 K/UL (ref 0–0.5)
EOSINOPHIL NFR BLD: 0 % (ref 0–8)
ERYTHROCYTE [DISTWIDTH] IN BLOOD BY AUTOMATED COUNT: 13.5 % (ref 11.5–14.5)
GLUCOSE UR QL STRIP: NEGATIVE
HCT VFR BLD AUTO: 41.1 % (ref 40–54)
HGB BLD-MCNC: 13.8 G/DL (ref 14–18)
HGB UR QL STRIP: ABNORMAL
IMM GRANULOCYTES # BLD AUTO: 0.03 K/UL (ref 0–0.04)
IMM GRANULOCYTES NFR BLD AUTO: 0.4 % (ref 0–0.5)
KETONES UR QL STRIP: NEGATIVE
LEUKOCYTE ESTERASE UR QL STRIP: NEGATIVE
LYMPHOCYTES # BLD AUTO: 0.8 K/UL (ref 1–4.8)
LYMPHOCYTES NFR BLD: 8.8 % (ref 18–48)
MCH RBC QN AUTO: 31.3 PG (ref 27–31)
MCHC RBC AUTO-ENTMCNC: 33.6 G/DL (ref 32–36)
MCV RBC AUTO: 93 FL (ref 82–98)
MICROSCOPIC COMMENT: ABNORMAL
MONOCYTES # BLD AUTO: 0.8 K/UL (ref 0.3–1)
MONOCYTES NFR BLD: 9.1 % (ref 4–15)
NEUTROPHILS # BLD AUTO: 7 K/UL (ref 1.8–7.7)
NEUTROPHILS NFR BLD: 81.3 % (ref 38–73)
NITRITE UR QL STRIP: NEGATIVE
NRBC BLD-RTO: 0 /100 WBC
PH UR STRIP: 7 [PH] (ref 5–8)
PLATELET # BLD AUTO: 241 K/UL (ref 150–450)
PMV BLD AUTO: 10.1 FL (ref 9.2–12.9)
PROT UR QL STRIP: ABNORMAL
RBC # BLD AUTO: 4.41 M/UL (ref 4.6–6.2)
RBC #/AREA URNS AUTO: 10 /HPF (ref 0–4)
SP GR UR STRIP: 1.01 (ref 1–1.03)
URN SPEC COLLECT METH UR: ABNORMAL
WBC # BLD AUTO: 8.54 K/UL (ref 3.9–12.7)

## 2024-07-12 PROCEDURE — 80053 COMPREHEN METABOLIC PANEL: CPT | Performed by: EMERGENCY MEDICINE

## 2024-07-12 PROCEDURE — 93010 ELECTROCARDIOGRAM REPORT: CPT | Mod: ,,, | Performed by: INTERNAL MEDICINE

## 2024-07-12 PROCEDURE — 87389 HIV-1 AG W/HIV-1&-2 AB AG IA: CPT | Performed by: PHYSICIAN ASSISTANT

## 2024-07-12 PROCEDURE — 81001 URINALYSIS AUTO W/SCOPE: CPT | Performed by: EMERGENCY MEDICINE

## 2024-07-12 PROCEDURE — 93005 ELECTROCARDIOGRAM TRACING: CPT

## 2024-07-12 PROCEDURE — 99285 EMERGENCY DEPT VISIT HI MDM: CPT | Mod: 25

## 2024-07-12 PROCEDURE — 86803 HEPATITIS C AB TEST: CPT | Performed by: PHYSICIAN ASSISTANT

## 2024-07-12 PROCEDURE — 85025 COMPLETE CBC W/AUTO DIFF WBC: CPT | Performed by: EMERGENCY MEDICINE

## 2024-07-12 NOTE — ED PROVIDER NOTES
"Encounter Date: 2024       History     Chief Complaint   Patient presents with    Nausea     Arrives via EMS from home, went on the floor to pick something up and could not get back up. Originally called EMS just for help to lift pt. EMS got called back because pt endorsed nausea. In triage pt stating he is no longer nauseous, reports feeling "fine"      89-year-old male with significant past medical history as noted below and reviewed.    Patient presents today after requiring a lift assist from EMS.  He dropped something that rolled under his couch.  He got out on 0 4 is reached under the couch when treat the abdomen but was subsequently unable to get up from the ground.  He reports this is not happened before.  He denies feeling generally weak or having any focal weakness in his extremities.  He had a brief, about 10 seconds, episode of nausea at home but it resolved spontaneously.  He denies any falls.  He denies any pain to his head, neck, or any other areas of his body.        Review of patient's allergies indicates:   Allergen Reactions    Bee pollens      Past Medical History:   Diagnosis Date    BPH with elevated PSA     Cataract     CKD (chronic kidney disease) stage 3, GFR 30-59 ml/min     Fever blister     H/O impaired glucose tolerance 2014    Hyperlipidemia     Pre-diabetes     Primary osteoarthritis of left hip 11/3/2015     Past Surgical History:   Procedure Laterality Date    THROAT SURGERY  20years    TONSILLECTOMY      VASECTOMY       Family History   Problem Relation Name Age of Onset    Melanoma Neg Hx       Social History     Tobacco Use    Smoking status: Former     Current packs/day: 0.00     Types: Cigarettes     Quit date: 3/8/1969     Years since quittin.3    Smokeless tobacco: Never   Substance Use Topics    Alcohol use: No     Alcohol/week: 0.0 standard drinks of alcohol     Comment: none since age 52    Drug use: No     Review of Systems  All other systems reviewed and " negative except as noted in HPI    Physical Exam     Initial Vitals [07/12/24 1559]   BP Pulse Resp Temp SpO2   128/64 87 18 98.7 °F (37.1 °C) 96 %      MAP       --         Physical Exam  General: AO x 3, NAD. Well nourished. Well Developed  Head: Normocephalic and Atraumatic  HEENT: PERRLA. EOMI. OP Clear  Neck: Supple, Nontender in midline.  Cardiovascular: RRR. No m/r/g. 2+ Distal Pulses  Pulm/Chest: Chest nontender. Lungs clear to auscultation. No respiratory distress.  Abdomen: Nontender. Nondistended. No rigidity, rebound, or guarding  MSK: extremities atraumatic x 4. Gait normal  Ext: no clubbing, cyanosis, or edema  Neuro: GCS 15. CN II-XII intact. Intact symmetric sensation, strength, DTR x 4 extremities  Skin: no bullae, petechiae, or purpura. Warm, dry, and intact.  Psych: normal mood and affect.    ED Course   Procedures  Labs Reviewed   CBC W/ AUTO DIFFERENTIAL - Abnormal; Notable for the following components:       Result Value    RBC 4.41 (*)     Hemoglobin 13.8 (*)     MCH 31.3 (*)     Lymph # 0.8 (*)     Gran % 81.3 (*)     Lymph % 8.8 (*)     All other components within normal limits   COMPREHENSIVE METABOLIC PANEL - Abnormal; Notable for the following components:    Sodium 135 (*)     CO2 17 (*)     All other components within normal limits   URINALYSIS, REFLEX TO URINE CULTURE - Abnormal; Notable for the following components:    Protein, UA Trace (*)     Occult Blood UA 2+ (*)     All other components within normal limits    Narrative:     Specimen Source->Urine   URINALYSIS MICROSCOPIC - Abnormal; Notable for the following components:    RBC, UA 10 (*)     All other components within normal limits    Narrative:     Specimen Source->Urine   HIV 1 / 2 ANTIBODY    Narrative:     Release to patient->Immediate   HEPATITIS C ANTIBODY    Narrative:     Release to patient->Immediate        ECG Results              EKG 12-lead (Final result)        Collection Time Result Time QRS Duration [facility] QTC  Calculation    07/12/24 18:25:26 07/13/24 10:48:16 90 445                     Final result by Interface, Lab In [facility] (07/13/24 10:48:24)                   Narrative:    Test Reason : W19.XXXA,    Vent. Rate : 092 BPM     Atrial Rate : 092 BPM     P-R Int : 190 ms          QRS Dur : 090 ms      QT Int : 360 ms       P-R-T Axes : 060 083 029 degrees     QTc Int : 445 ms    Sinus rhythm with marked sinus arrythmia with occasional Premature  ventricular complexes  Nonspecific ST and T wave abnormality  Abnormal ECG  When compared with ECG of 11-OCT-2022 08:37,  ST now depressed in Anterior leads  Nonspecific T wave abnormality, worse in Inferior leads  Confirmed by Deniz Varela MD ([id number]) on 7/13/2024 10:48:13 AM    Referred By: [email or web address]   SELF           Confirmed By:Deniz Varela MD                                  Imaging Results              CT Head Without Contrast (Final result)  Result time 07/12/24 18:25:29      Final result by Harjinder Becerra MD (07/12/24 18:25:29)                   Impression:      No acute intracranial abnormality.    Suspected sequela of mild chronic microvascular ischemic disease with cerebral volume loss.    Trace left mastoid effusion.      Electronically signed by: Harjinder Becerra MD  Date:    07/12/2024  Time:    18:25               Narrative:    EXAMINATION:  CT HEAD WITHOUT CONTRAST    CLINICAL HISTORY:  Head trauma, minor (Age >= 65y);    TECHNIQUE:  Low dose axial CT images obtained throughout the head without intravenous contrast. Sagittal and coronal reconstructions were performed.    COMPARISON:  None.    FINDINGS:  Intracranial compartment: Brain appears normally formed.    Age-related generalized cerebral volume loss.  The ventricles are midline without distortion by mass effect or acute hydrocephalus.  No extra-axial blood or fluid collections.  Suspected subcentimeter extra-axial lipoma along the posterior falx.    Mild patchy hypoattenuation of the supratentorial white  matter.  No parenchymal mass, hemorrhage, edema or major vascular distribution infarct.    Skull/extracranial contents (limited evaluation): No fracture.  Mild hyperostosis frontalis interna.  Minimal fluid in the left mastoid air cells.  Right mastoid air cells and paranasal sinuses are essentially clear.  Suspected remote operative change at the bilateral ocular lenses.  Degenerative change at the bilateral TMJs/mandibular condyles.                                       Medications - No data to display  Medical Decision Making  Patient was asymptomatic at this time. We engaged in shared decision-making and he does not believe he requires any further testing in the emergency department.  Given the patient required a lift assist from EMS, I will refer him to physical therapy/occupational therapy as an outpatient.  After further discussion with his daughter, I will obtain a CT of his head and some basic labs as they are less convinced that he did not suffer a fall.    Amount and/or Complexity of Data Reviewed  External Data Reviewed: labs, radiology, ECG and notes.     Details: Series of medical visits a little less than 1 year ago for dysfunction of pelvis and lower back  Labs: ordered. Decision-making details documented in ED Course.  Radiology: ordered.    Risk  Decision regarding hospitalization.  Diagnosis or treatment significantly limited by social determinants of health.               ED Course as of 07/13/24 2211 Fri Jul 12, 2024   1829 CT head is without acute skull fracture or intracranial hemorrhage [DS]   1916 CBC auto differential(!) [DS]      ED Course User Index  [DS] Sessions, Jabari COOMBS MD                           Clinical Impression:  Final diagnoses:  [Z74.1] Stand by assistance needed for activity (Primary)  [W19.XXXA] Fall          ED Disposition Condition    Discharge           ED Prescriptions    None       Follow-up Information       Follow up With Specialties Details Why Contact Info     Maryjane Adkins MD Internal Medicine Call on 7/15/2024  [address] [facility] [address]  [phone]               Sessions, Jabari COOMBS MD  07/12/24 1701       Sessions, Jabari COOMBS MD  07/12/24 1731       Sessions, Jabari COOMBS MD  07/13/24 [id number]

## 2024-07-12 NOTE — ED TRIAGE NOTES
"Grey Vance Jr., a 89 y.o. male presents to the ED w/ complaint of nausea. Pt states earlier today he went to pick something up from the floor and fell. He states he was not able to get up. Pt denies hitting his head and loc. Pt reports feeling nauseous at the time but states he does not feel nauseous at the moment.     Triage note:  Chief Complaint   Patient presents with    Nausea     Arrives via EMS from home, went on the floor to pick something up and could not get back up. Originally called EMS just for help to lift pt. EMS got called back because pt endorsed nausea. In triage pt stating he is no longer nauseous, reports feeling "fine"      Review of patient's allergies indicates:   Allergen Reactions    Bee pollens      Past Medical History:   Diagnosis Date    BPH with elevated PSA     Cataract     CKD (chronic kidney disease) stage 3, GFR 30-59 ml/min     Fever blister     H/O impaired glucose tolerance 4/2/2014    Hyperlipidemia     Pre-diabetes     Primary osteoarthritis of left hip 11/3/2015       "

## 2024-07-12 NOTE — DISCHARGE INSTRUCTIONS
If you experience anymore nausea, any weakness, or any new or concerning symptoms, return to the emergency department

## 2024-07-13 LAB
ALBUMIN SERPL BCP-MCNC: 3.6 G/DL (ref 3.5–5.2)
ALP SERPL-CCNC: 63 U/L (ref 55–135)
ALT SERPL W/O P-5'-P-CCNC: 24 U/L (ref 10–44)
ANION GAP SERPL CALC-SCNC: 11 MMOL/L (ref 8–16)
AST SERPL-CCNC: 40 U/L (ref 10–40)
BILIRUB SERPL-MCNC: 0.6 MG/DL (ref 0.1–1)
BUN SERPL-MCNC: 13 MG/DL (ref 8–23)
CALCIUM SERPL-MCNC: 9 MG/DL (ref 8.7–10.5)
CHLORIDE SERPL-SCNC: 107 MMOL/L (ref 95–110)
CO2 SERPL-SCNC: 17 MMOL/L (ref 23–29)
CREAT SERPL-MCNC: 1.1 MG/DL (ref 0.5–1.4)
EST. GFR  (NO RACE VARIABLE): >60 ML/MIN/1.73 M^2
GLUCOSE SERPL-MCNC: 102 MG/DL (ref 70–110)
HCV AB SERPL QL IA: NORMAL
HIV 1+2 AB+HIV1 P24 AG SERPL QL IA: NORMAL
OHS QRS DURATION: 90 MS
OHS QTC CALCULATION: 445 MS
POTASSIUM SERPL-SCNC: 4 MMOL/L (ref 3.5–5.1)
PROT SERPL-MCNC: 7.2 G/DL (ref 6–8.4)
SODIUM SERPL-SCNC: 135 MMOL/L (ref 136–145)

## 2024-07-14 ENCOUNTER — OFFICE VISIT (OUTPATIENT)
Dept: URGENT CARE | Facility: CLINIC | Age: 89
End: 2024-07-14
Payer: MEDICARE

## 2024-07-14 VITALS
RESPIRATION RATE: 16 BRPM | BODY MASS INDEX: 41.62 KG/M2 | TEMPERATURE: 99 F | HEART RATE: 68 BPM | DIASTOLIC BLOOD PRESSURE: 82 MMHG | WEIGHT: 259 LBS | HEIGHT: 66 IN | OXYGEN SATURATION: 94 % | SYSTOLIC BLOOD PRESSURE: 121 MMHG

## 2024-07-14 DIAGNOSIS — R05.9 COUGH, UNSPECIFIED TYPE: ICD-10-CM

## 2024-07-14 DIAGNOSIS — U07.1 COVID-19: ICD-10-CM

## 2024-07-14 DIAGNOSIS — R41.82 ACUTE ALTERATION IN MENTAL STATUS: ICD-10-CM

## 2024-07-14 DIAGNOSIS — R30.0 DYSURIA: Primary | ICD-10-CM

## 2024-07-14 DIAGNOSIS — U07.1 COVID-19 VIRUS DETECTED: ICD-10-CM

## 2024-07-14 LAB
BILIRUBIN, UA POC OHS: ABNORMAL
BLOOD, UA POC OHS: NEGATIVE
CLARITY, UA POC OHS: CLEAR
COLOR, UA POC OHS: YELLOW
CTP QC/QA: YES
GLUCOSE, UA POC OHS: NEGATIVE
KETONES, UA POC OHS: 15
LEUKOCYTES, UA POC OHS: NEGATIVE
MOLECULAR STREP A: NEGATIVE
NITRITE, UA POC OHS: NEGATIVE
PH, UA POC OHS: 5.5
POC MOLECULAR INFLUENZA A AGN: NEGATIVE
POC MOLECULAR INFLUENZA B AGN: NEGATIVE
PROTEIN, UA POC OHS: 100
RSV RAPID ANTIGEN: NEGATIVE
SARS-COV-2 AG RESP QL IA.RAPID: POSITIVE
SPECIFIC GRAVITY, UA POC OHS: >=1.03
UROBILINOGEN, UA POC OHS: 1

## 2024-07-14 PROCEDURE — 99214 OFFICE O/P EST MOD 30 MIN: CPT | Mod: S$GLB,,, | Performed by: FAMILY MEDICINE

## 2024-07-14 PROCEDURE — 81003 URINALYSIS AUTO W/O SCOPE: CPT | Mod: QW,S$GLB,, | Performed by: FAMILY MEDICINE

## 2024-07-14 PROCEDURE — 87811 SARS-COV-2 COVID19 W/OPTIC: CPT | Mod: QW,S$GLB,, | Performed by: FAMILY MEDICINE

## 2024-07-14 PROCEDURE — 87651 STREP A DNA AMP PROBE: CPT | Mod: QW,S$GLB,, | Performed by: FAMILY MEDICINE

## 2024-07-14 PROCEDURE — 87807 RSV ASSAY W/OPTIC: CPT | Mod: QW,S$GLB,, | Performed by: FAMILY MEDICINE

## 2024-07-14 PROCEDURE — 87502 INFLUENZA DNA AMP PROBE: CPT | Mod: QW,S$GLB,, | Performed by: FAMILY MEDICINE

## 2024-07-14 RX ORDER — BENZONATATE 200 MG/1
200 CAPSULE ORAL 3 TIMES DAILY PRN
Qty: 30 CAPSULE | Refills: 0 | Status: SHIPPED | OUTPATIENT
Start: 2024-07-14 | End: 2024-07-24

## 2024-07-14 RX ORDER — NIRMATRELVIR AND RITONAVIR 300-100 MG
KIT ORAL
Qty: 30 TABLET | Refills: 0 | Status: SHIPPED | OUTPATIENT
Start: 2024-07-14

## 2024-07-14 NOTE — PROGRESS NOTES
"Subjective:      Patient ID: Grey Vance Jr. is a 89 y.o. male.    Vitals:  height is 5' 6" (1.676 m) and weight is 117.5 kg (259 lb). His oral temperature is 98.7 °F (37.1 °C). His blood pressure is 121/82 and his pulse is 68. His respiration is 16 and oxygen saturation is 94% (abnormal).     Chief Complaint: mental status change went to ER last night .  No infectious disease testing done last time dad acted this way  He had the flu.    This is a 89 y.o. male who presents today with a chief complaint of Friday having a fall and having nausea. Pt was seen at main campus for this and stated that everything was normal for him. Seen then he has been off balance disoriented and forgetful. Pt family member is requesting that he gets all testing done and also tested for UTI.       Other  This is a new problem. The current episode started more than 1 month ago. The problem occurs constantly. The problem has been unchanged. Pertinent negatives include no abdominal pain, anorexia, arthralgias, change in bowel habit, chest pain, chills, congestion, coughing, diaphoresis, fatigue, fever, headaches, joint swelling, myalgias, nausea, neck pain, numbness, rash, sore throat, swollen glands, urinary symptoms, vertigo, visual change, vomiting or weakness. Nothing aggravates the symptoms. He has tried nothing for the symptoms.       Constitution: Negative for chills, sweating, fatigue and fever.   HENT:  Negative for congestion and sore throat.    Neck: Negative for neck pain.   Cardiovascular:  Negative for chest pain.   Respiratory:  Negative for cough.    Gastrointestinal:  Negative for abdominal pain, nausea and vomiting.   Musculoskeletal:  Negative for joint pain, joint swelling and muscle ache.   Skin:  Negative for rash and erythema.   Neurological:  Negative for history of vertigo, headaches and numbness.      Objective:     Physical Exam   Constitutional: He is oriented to person, place, and time. He appears ill. No " distress. obesity  HENT:   Head: Normocephalic and atraumatic.   Ears:   Right Ear: Tympanic membrane, external ear and ear canal normal.   Left Ear: Tympanic membrane, external ear and ear canal normal.   Nose: No rhinorrhea or congestion.   Mouth/Throat: Mucous membranes are moist. No oropharyngeal exudate or posterior oropharyngeal erythema. Oropharynx is clear.   Eyes: Conjunctivae are normal. Pupils are equal, round, and reactive to light. Extraocular movement intact   Neck: Neck supple.   Cardiovascular: Normal rate, regular rhythm, normal heart sounds and normal pulses.   Pulmonary/Chest: Effort normal and breath sounds normal. No respiratory distress.   Abdominal: Normal appearance and bowel sounds are normal. He exhibits distension. flat abdomen   Musculoskeletal: Normal range of motion.         General: Normal range of motion.   Neurological: no focal deficit. He is alert, oriented to person, place, and time and at baseline.   Skin: Skin is warm and dry. Capillary refill takes less than 2 seconds. No erythema   Psychiatric: His behavior is normal. Mood, judgment and thought content normal.   Nursing note and vitals reviewed.    Assessment:     Plan:   1. Dysuria  - POCT Urinalysis(Instrument)    2. Acute alteration in mental status  - POCT Influenza A/B MOLECULAR  - SARS Coronavirus 2 Antigen, POCT Manual Read  - POCT Strep A, Molecular  - POCT respiratory syncytial virus    3. COVID-19  - nirmatrelvir-ritonavir (PAXLOVID) 300 mg (150 mg x 2)-100 mg copackaged tablets (EUA); Take 3 tablets by mouth 2 (two) times daily. Each dose contains 2 nirmatrelvir (pink tablets) and 1 ritonavir (white tablet). Take all 3 tablets together  Dispense: 30 tablet; Refill: 0    4. Cough, unspecified type  - benzonatate (TESSALON) 200 MG capsule; Take 1 capsule (200 mg total) by mouth 3 (three) times daily as needed.  Dispense: 30 capsule; Refill: 0   All results discussed with pt and his son prior to discharge from  clinic

## 2024-07-14 NOTE — PATIENT INSTRUCTIONS
Isolate for 5 days , may go out after 5 days wearing a mask.  Monitor pulse oximetry call if lower than 94%  Take meds daily make sure he eats and rests

## 2024-07-15 ENCOUNTER — PATIENT OUTREACH (OUTPATIENT)
Dept: EMERGENCY MEDICINE | Facility: HOSPITAL | Age: 89
End: 2024-07-15
Payer: MEDICARE

## 2024-07-16 NOTE — PROGRESS NOTES
Call placed to Pt to f/u from recent ED visit. No answer x 2 attempts. Encounter to be closed at this time.

## 2024-09-19 DIAGNOSIS — N13.8 BENIGN PROSTATIC HYPERPLASIA WITH URINARY OBSTRUCTION: ICD-10-CM

## 2024-09-19 DIAGNOSIS — N40.1 BENIGN PROSTATIC HYPERPLASIA WITH URINARY OBSTRUCTION: ICD-10-CM

## 2024-09-20 RX ORDER — TAMSULOSIN HYDROCHLORIDE 0.4 MG/1
CAPSULE ORAL
Qty: 90 CAPSULE | Refills: 3 | Status: SHIPPED | OUTPATIENT
Start: 2024-09-20

## 2024-09-20 NOTE — TELEPHONE ENCOUNTER
Care Due:                  Date            Visit Type   Department     Provider  --------------------------------------------------------------------------------                                EP -                              PRIMARY      [facility]  Last Visit: 11-      CARE ([facility])   MEDICINE       SAUNDRA LOZADA  Next Visit: None Scheduled  None         None Found                                                            Last  Test          Frequency    Reason                     Performed    Due Date  --------------------------------------------------------------------------------    Lipid Panel.  12 months..  simvastatin..............  04- 03-    [facility] Embedded Care Due Messages. Reference number: [id number].   9/19/2024 10:36:47 PM CDT

## 2024-09-26 DIAGNOSIS — Z00.00 ENCOUNTER FOR MEDICARE ANNUAL WELLNESS EXAM: ICD-10-CM

## 2024-09-27 ENCOUNTER — TELEPHONE (OUTPATIENT)
Dept: DERMATOLOGY | Facility: CLINIC | Age: 89
End: 2024-09-27
Payer: MEDICARE

## 2025-02-21 DIAGNOSIS — Z00.00 ENCOUNTER FOR MEDICARE ANNUAL WELLNESS EXAM: ICD-10-CM

## 2025-05-15 ENCOUNTER — PATIENT OUTREACH (OUTPATIENT)
Dept: ADMINISTRATIVE | Facility: HOSPITAL | Age: 89
End: 2025-05-15
Payer: MEDICARE

## 2025-05-15 NOTE — PROGRESS NOTES
Chart reviewed and updated. Reconciled immunizations. Has PCP appt on 6/24/25    Eduarda Abreu LPN   Clinical Care Coordinator  Primary Care and Wellness

## 2025-05-29 ENCOUNTER — TELEPHONE (OUTPATIENT)
Dept: INTERNAL MEDICINE | Facility: CLINIC | Age: 89
End: 2025-05-29
Payer: MEDICARE

## 2025-05-30 ENCOUNTER — LAB VISIT (OUTPATIENT)
Dept: LAB | Facility: HOSPITAL | Age: 89
End: 2025-05-30
Attending: INTERNAL MEDICINE
Payer: MEDICARE

## 2025-05-30 ENCOUNTER — OFFICE VISIT (OUTPATIENT)
Dept: INTERNAL MEDICINE | Facility: CLINIC | Age: 89
End: 2025-05-30
Payer: MEDICARE

## 2025-05-30 VITALS
WEIGHT: 263.31 LBS | HEART RATE: 76 BPM | BODY MASS INDEX: 42.32 KG/M2 | DIASTOLIC BLOOD PRESSURE: 60 MMHG | SYSTOLIC BLOOD PRESSURE: 124 MMHG | HEIGHT: 66 IN | OXYGEN SATURATION: 96 %

## 2025-05-30 DIAGNOSIS — R73.03 PREDIABETES: ICD-10-CM

## 2025-05-30 DIAGNOSIS — R68.89 OTHER GENERAL SYMPTOMS AND SIGNS: ICD-10-CM

## 2025-05-30 DIAGNOSIS — F03.A0 MILD DEMENTIA WITHOUT BEHAVIORAL DISTURBANCE, PSYCHOTIC DISTURBANCE, MOOD DISTURBANCE, OR ANXIETY, UNSPECIFIED DEMENTIA TYPE: Primary | ICD-10-CM

## 2025-05-30 DIAGNOSIS — E78.5 HYPERLIPIDEMIA, UNSPECIFIED HYPERLIPIDEMIA TYPE: ICD-10-CM

## 2025-05-30 DIAGNOSIS — E55.9 MILD VITAMIN D DEFICIENCY: ICD-10-CM

## 2025-05-30 DIAGNOSIS — N18.30 STAGE 3 CHRONIC KIDNEY DISEASE, UNSPECIFIED WHETHER STAGE 3A OR 3B CKD: ICD-10-CM

## 2025-05-30 DIAGNOSIS — R41.3 OTHER AMNESIA: ICD-10-CM

## 2025-05-30 DIAGNOSIS — F03.A0 MILD DEMENTIA WITHOUT BEHAVIORAL DISTURBANCE, PSYCHOTIC DISTURBANCE, MOOD DISTURBANCE, OR ANXIETY, UNSPECIFIED DEMENTIA TYPE: ICD-10-CM

## 2025-05-30 DIAGNOSIS — R21 RASH AND NONSPECIFIC SKIN ERUPTION: ICD-10-CM

## 2025-05-30 DIAGNOSIS — I87.8 VENOUS STASIS: ICD-10-CM

## 2025-05-30 DIAGNOSIS — I87.2 VENOUS STASIS DERMATITIS OF BOTH LOWER EXTREMITIES: ICD-10-CM

## 2025-05-30 DIAGNOSIS — Z71.1 CONCERN ABOUT MEMORY: ICD-10-CM

## 2025-05-30 LAB
25(OH)D3+25(OH)D2 SERPL-MCNC: 28 NG/ML (ref 30–96)
ABSOLUTE EOSINOPHIL (OHS): 0.24 K/UL
ABSOLUTE MONOCYTE (OHS): 0.57 K/UL (ref 0.3–1)
ABSOLUTE NEUTROPHIL COUNT (OHS): 4.85 K/UL (ref 1.8–7.7)
ALBUMIN SERPL BCP-MCNC: 3.8 G/DL (ref 3.5–5.2)
ALP SERPL-CCNC: 73 UNIT/L (ref 40–150)
ALT SERPL W/O P-5'-P-CCNC: 26 UNIT/L (ref 10–44)
ANION GAP (OHS): 9 MMOL/L (ref 8–16)
AST SERPL-CCNC: 18 UNIT/L (ref 11–45)
BASOPHILS # BLD AUTO: 0.05 K/UL
BASOPHILS NFR BLD AUTO: 0.7 %
BILIRUB SERPL-MCNC: 0.4 MG/DL (ref 0.1–1)
BUN SERPL-MCNC: 21 MG/DL (ref 8–23)
CALCIUM SERPL-MCNC: 9.2 MG/DL (ref 8.7–10.5)
CHLORIDE SERPL-SCNC: 106 MMOL/L (ref 95–110)
CHOLEST SERPL-MCNC: 135 MG/DL (ref 120–199)
CHOLEST/HDLC SERPL: 3.5 {RATIO} (ref 2–5)
CO2 SERPL-SCNC: 23 MMOL/L (ref 23–29)
CREAT SERPL-MCNC: 1.2 MG/DL (ref 0.5–1.4)
EAG (OHS): 123 MG/DL (ref 68–131)
ERYTHROCYTE [DISTWIDTH] IN BLOOD BY AUTOMATED COUNT: 13.2 % (ref 11.5–14.5)
GFR SERPLBLD CREATININE-BSD FMLA CKD-EPI: 58 ML/MIN/1.73/M2
GLUCOSE SERPL-MCNC: 106 MG/DL (ref 70–110)
HBA1C MFR BLD: 5.9 % (ref 4–5.6)
HCT VFR BLD AUTO: 42 % (ref 40–54)
HDLC SERPL-MCNC: 39 MG/DL (ref 40–75)
HDLC SERPL: 28.9 % (ref 20–50)
HGB BLD-MCNC: 14.1 GM/DL (ref 14–18)
IMM GRANULOCYTES # BLD AUTO: 0.02 K/UL (ref 0–0.04)
IMM GRANULOCYTES NFR BLD AUTO: 0.3 % (ref 0–0.5)
LDLC SERPL CALC-MCNC: 72 MG/DL (ref 63–159)
LYMPHOCYTES # BLD AUTO: 1.78 K/UL (ref 1–4.8)
MCH RBC QN AUTO: 30.9 PG (ref 27–31)
MCHC RBC AUTO-ENTMCNC: 33.6 G/DL (ref 32–36)
MCV RBC AUTO: 92 FL (ref 82–98)
NONHDLC SERPL-MCNC: 96 MG/DL
NUCLEATED RBC (/100WBC) (OHS): 0 /100 WBC
PLATELET # BLD AUTO: 293 K/UL (ref 150–450)
PMV BLD AUTO: 10 FL (ref 9.2–12.9)
POTASSIUM SERPL-SCNC: 4.2 MMOL/L (ref 3.5–5.1)
PROT SERPL-MCNC: 7 GM/DL (ref 6–8.4)
RBC # BLD AUTO: 4.57 M/UL (ref 4.6–6.2)
RELATIVE EOSINOPHIL (OHS): 3.2 %
RELATIVE LYMPHOCYTE (OHS): 23.7 % (ref 18–48)
RELATIVE MONOCYTE (OHS): 7.6 % (ref 4–15)
RELATIVE NEUTROPHIL (OHS): 64.5 % (ref 38–73)
SODIUM SERPL-SCNC: 138 MMOL/L (ref 136–145)
TRIGL SERPL-MCNC: 120 MG/DL (ref 30–150)
TSH SERPL-ACNC: 1.85 UIU/ML (ref 0.4–4)
VIT B12 SERPL-MCNC: 345 PG/ML (ref 210–950)
WBC # BLD AUTO: 7.51 K/UL (ref 3.9–12.7)

## 2025-05-30 PROCEDURE — 99999 PR PBB SHADOW E&M-EST. PATIENT-LVL IV: CPT | Mod: PBBFAC,,, | Performed by: INTERNAL MEDICINE

## 2025-05-30 PROCEDURE — 85025 COMPLETE CBC W/AUTO DIFF WBC: CPT

## 2025-05-30 PROCEDURE — 99214 OFFICE O/P EST MOD 30 MIN: CPT | Mod: PBBFAC | Performed by: INTERNAL MEDICINE

## 2025-05-30 PROCEDURE — 82306 VITAMIN D 25 HYDROXY: CPT

## 2025-05-30 PROCEDURE — 80053 COMPREHEN METABOLIC PANEL: CPT

## 2025-05-30 PROCEDURE — 82607 VITAMIN B-12: CPT

## 2025-05-30 PROCEDURE — 84443 ASSAY THYROID STIM HORMONE: CPT

## 2025-05-30 PROCEDURE — 80061 LIPID PANEL: CPT

## 2025-05-30 PROCEDURE — 36415 COLL VENOUS BLD VENIPUNCTURE: CPT

## 2025-05-30 PROCEDURE — 83036 HEMOGLOBIN GLYCOSYLATED A1C: CPT

## 2025-05-30 RX ORDER — DONEPEZIL HYDROCHLORIDE 5 MG/1
TABLET, FILM COATED ORAL
Qty: 734 TABLET | Refills: 0 | Status: SHIPPED | OUTPATIENT
Start: 2025-05-30 | End: 2026-06-13

## 2025-05-30 RX ORDER — MUPIROCIN 20 MG/G
OINTMENT TOPICAL 2 TIMES DAILY
Qty: 30 G | Refills: 0 | Status: SHIPPED | OUTPATIENT
Start: 2025-05-30

## 2025-05-30 RX ORDER — FUROSEMIDE 40 MG/1
40 TABLET ORAL DAILY
Qty: 5 TABLET | Refills: 0 | Status: SHIPPED | OUTPATIENT
Start: 2025-05-30 | End: 2025-06-04

## 2025-05-30 NOTE — PROGRESS NOTES
"Subjective:       Patient ID: Grey Vance Jr. is a 89 y.o. male.    Chief Complaint: Annual Exam (Rash on leg /Memory loss )    HPI  Grey Vance Jr. is a 89 y.o. male here for routine follow up of the following chronic issues:    Rash right lower leg - saw [place] on 4/16/25 -   Treated w doxycycline and mupirocin ointment topical   Saw Dr. Delgado in derm on 417 - dx as stasis dermatitis bilaterally also with tinea pedis for which he was rx econazole cream.      CKD 3   Last gfr was > 60    Lipids -   Zocor 20 mg daily    Stress test in 2022: nl EF and no signs of ischemia    Calls multiple times in an hour, turned water on a few minutes after contractor told him not to, forgot how to get how to son's house.     Going on a few years, getting gradually worse.   No prior stroke sx.     Prior CT:   Suspected sequela of mild chronic microvascular ischemic disease with cerebral volume loss.     Abbreviated MMSE  Oriented to location   not oriented to month, date, day of week or year (2024)  3/3 immediate recall  Delayed recall  3/3  Sentence repeat good  Can follow written and verbal instructions  Clock drawing intact  Serials 7's excellent    His son and his caretaker are present for today's exam.   He has assistance at  home during daytime hours 7 days per week.   He drives on occasion. His son feels his technical driving ability remains excellent but concerns about getting lost in unfamiliar locations or if driving at night.     Review of Systems   Constitutional:  Negative for fever.   Respiratory:  Negative for shortness of breath.    Cardiovascular:  Negative for chest pain.   Musculoskeletal: Negative.    Skin: Negative.        Objective:   /60 (BP Location: Left arm, Patient Position: Sitting)   Pulse 76   Ht 5' 6" (1.676 m)   Wt 119.4 kg (263 lb 5.4 oz)   SpO2 96%   BMI 42.50 kg/m²      Physical Exam  Constitutional:       General: He is not in acute distress.     Appearance: He is " well-developed. He is not diaphoretic.   HENT:      Head: Normocephalic and atraumatic.   Cardiovascular:      Rate and Rhythm: Normal rate.   Pulmonary:      Effort: Pulmonary effort is normal. No respiratory distress.   Skin:     General: Skin is warm and dry.      Comments: Bilateral stasis dermatitis and lower ext edema 2+ bilaterally   Neurological:      General: No focal deficit present.      Mental Status: He is alert.      Cranial Nerves: No cranial nerve deficit.      Comments: Oriented x 2   Psychiatric:         Behavior: Behavior normal.         Assessment:       1. Mild dementia without behavioral disturbance, psychotic disturbance, mood disturbance, or anxiety, unspecified dementia type    2. Concern about memory    3. Prediabetes    4. Stage 3 chronic kidney disease, unspecified whether stage 3a or 3b CKD    5. Mild vitamin D deficiency    6. Hyperlipidemia, unspecified hyperlipidemia type    7. Rash and nonspecific skin eruption    8. Venous stasis dermatitis of both lower extremities    9. Venous stasis    10. Other amnesia    11. Other general symptoms and signs        Plan:       Mild dementia without behavioral disturbance, psychotic disturbance, mood disturbance, or anxiety, unspecified dementia type  -     TSH; Future; Expected date: 05/30/2025  -     Vitamin B12; Future; Expected date: 05/30/2025  -     MRI Brain Without Contrast; Future; Expected date: 05/30/2025  -     donepeziL (ARICEPT) 5 MG tablet; Take 1 tablet (5 mg total) by mouth every evening for 14 days, THEN 2 tablets (10 mg total) every evening.  Dispense: 734 tablet; Refill: 0  Long discussion regarding safety especially concerning driving and cooking  He has help during the day    Concern about memory  -     TSH; Future; Expected date: 05/30/2025  -     Vitamin B12; Future; Expected date: 05/30/2025    Prediabetes  -     Hemoglobin A1C; Future; Expected date: 05/30/2025    Stage 3 chronic kidney disease, stabe 2 vs 3a  -     CBC  Auto Differential; Future; Expected date: 05/30/2025  -     Comprehensive Metabolic Panel; Future; Expected date: 05/30/2025  Discussed     Mild vitamin D deficiency  -     Vitamin D; Future; Expected date: 05/30/2025    Hyperlipidemia, unspecified hyperlipidemia type  -     Lipid Panel; Future; Expected date: 05/30/2025    Rash and nonspecific skin eruption    Venous stasis dermatitis of both lower extremities  Venous stasis  -     furosemide (LASIX) 40 MG tablet; Take 1 tablet (40 mg total) by mouth once daily. for 5 days  Dispense: 5 tablet; Refill: 0  -     mupirocin (BACTROBAN) 2 % ointment; Apply topically 2 (two) times daily.  Dispense: 30 g; Refill: 0  Wrap feet with ace wrap daily, elevate when possible  Mupirocin to wounds on legs  May apply fragrance free ointment or lotion such as aquaphor, cerave or eucerin (cold helps itching)  Wrap with ace during day  Elevate legs when possible  Decrease salt in diet    Other amnesia  -     MRI Brain Without Contrast; Future; Expected date: 05/30/2025    Other general symptoms and signs  -     TSH; Future; Expected date: 05/30/2025          You are up to date for your primary preventive health care, and there are no reminders at this time.     60 minutes of total time spent on the encounter - this includes face to face time and non-face to face time preparing to see the patient (eg, review of tests), obtaining and/or reviewing separately obtained history, documenting clinical information in the electronic or other health record, independently interpreting results (not separately reported) and communicating results to the patient/family/caregiver, and/or care coordination (not separately reported.)

## 2025-05-30 NOTE — PATIENT INSTRUCTIONS
Mupirocin to wounds on legs  May apply fragrance free ointment or lotion such as aquaphor, cerave or eucerin (cold helps itching)  Wrap with ace during day  Elevate legs when possible  Decrease salt in diet   No

## 2025-06-13 ENCOUNTER — HOSPITAL ENCOUNTER (OUTPATIENT)
Dept: RADIOLOGY | Facility: HOSPITAL | Age: 89
Discharge: HOME | End: 2025-06-13
Attending: INTERNAL MEDICINE
Payer: MEDICARE

## 2025-06-13 DIAGNOSIS — R41.3 OTHER AMNESIA: ICD-10-CM

## 2025-06-13 DIAGNOSIS — F03.A0 MILD DEMENTIA WITHOUT BEHAVIORAL DISTURBANCE, PSYCHOTIC DISTURBANCE, MOOD DISTURBANCE, OR ANXIETY, UNSPECIFIED DEMENTIA TYPE: ICD-10-CM

## 2025-06-13 PROCEDURE — 70551 MRI BRAIN STEM W/O DYE: CPT | Mod: TC

## 2025-06-13 PROCEDURE — 70551 MRI BRAIN STEM W/O DYE: CPT | Mod: 26,,, | Performed by: RADIOLOGY

## 2025-06-16 ENCOUNTER — RESULTS FOLLOW-UP (OUTPATIENT)
Dept: INTERNAL MEDICINE | Facility: CLINIC | Age: 89
End: 2025-06-16

## 2025-07-14 ENCOUNTER — TELEPHONE (OUTPATIENT)
Dept: INTERNAL MEDICINE | Facility: CLINIC | Age: 89
End: 2025-07-14
Payer: MEDICARE

## 2025-07-15 ENCOUNTER — OFFICE VISIT (OUTPATIENT)
Dept: INTERNAL MEDICINE | Facility: CLINIC | Age: 89
End: 2025-07-15
Payer: MEDICARE

## 2025-07-15 VITALS
DIASTOLIC BLOOD PRESSURE: 62 MMHG | SYSTOLIC BLOOD PRESSURE: 124 MMHG | BODY MASS INDEX: 42.31 KG/M2 | HEIGHT: 66 IN | WEIGHT: 263.25 LBS | HEART RATE: 71 BPM | OXYGEN SATURATION: 96 %

## 2025-07-15 DIAGNOSIS — N18.31 STAGE 3A CHRONIC KIDNEY DISEASE: ICD-10-CM

## 2025-07-15 DIAGNOSIS — R73.03 PREDIABETES: ICD-10-CM

## 2025-07-15 DIAGNOSIS — I87.8 VENOUS STASIS: ICD-10-CM

## 2025-07-15 DIAGNOSIS — E55.9 VITAMIN D INSUFFICIENCY: ICD-10-CM

## 2025-07-15 DIAGNOSIS — F03.A0 MILD DEMENTIA WITHOUT BEHAVIORAL DISTURBANCE, PSYCHOTIC DISTURBANCE, MOOD DISTURBANCE, OR ANXIETY, UNSPECIFIED DEMENTIA TYPE: Primary | ICD-10-CM

## 2025-07-15 PROCEDURE — G2211 COMPLEX E/M VISIT ADD ON: HCPCS | Mod: ,,, | Performed by: INTERNAL MEDICINE

## 2025-07-15 PROCEDURE — 99999 PR PBB SHADOW E&M-EST. PATIENT-LVL IV: CPT | Mod: PBBFAC,,, | Performed by: INTERNAL MEDICINE

## 2025-07-15 PROCEDURE — 99214 OFFICE O/P EST MOD 30 MIN: CPT | Mod: S$PBB,,, | Performed by: INTERNAL MEDICINE

## 2025-07-15 PROCEDURE — 99214 OFFICE O/P EST MOD 30 MIN: CPT | Mod: PBBFAC | Performed by: INTERNAL MEDICINE

## 2025-07-15 RX ORDER — DONEPEZIL HYDROCHLORIDE 10 MG/1
10 TABLET, FILM COATED ORAL NIGHTLY
Qty: 90 TABLET | Refills: 3 | Status: SHIPPED | OUTPATIENT
Start: 2025-07-15 | End: 2025-07-15 | Stop reason: SDUPTHER

## 2025-07-15 RX ORDER — CHOLECALCIFEROL (VITAMIN D3) 25 MCG
10000 TABLET ORAL WEEKLY
Qty: 90 TABLET | Refills: 3 | Status: SHIPPED | OUTPATIENT
Start: 2025-07-15

## 2025-07-15 RX ORDER — CHOLECALCIFEROL (VITAMIN D3) 25 MCG
10000 TABLET ORAL WEEKLY
Qty: 90 TABLET | Refills: 3 | Status: SHIPPED | OUTPATIENT
Start: 2025-07-15 | End: 2025-07-15 | Stop reason: SDUPTHER

## 2025-07-15 RX ORDER — DONEPEZIL HYDROCHLORIDE 10 MG/1
10 TABLET, FILM COATED ORAL NIGHTLY
Qty: 90 TABLET | Refills: 3 | Status: SHIPPED | OUTPATIENT
Start: 2025-07-15 | End: 2026-07-10

## 2025-07-15 NOTE — PROGRESS NOTES
"Subjective:       Patient ID: Grey Vance Jr. is a 89 y.o. male.    Chief Complaint: Follow-up    HPI  89 y.o. male here for follow up of    Mild dementia    Recall last visit: Prior CT:   Suspected sequela of mild chronic microvascular ischemic disease with cerebral volume loss.   He has assistance at  home during daytime hours 7 days per week.   He drives on occasion. His son feels his technical driving ability remains excellent but concerns about getting lost in unfamiliar locations or if driving at night.     Started donepezil 5mg x 2 weeks then 10mg qhs  Tsh B12 were both wnl  MRI - senecent changes w volume loss, ventriculomegaly, chronic microvascular ischemia    He was unclear on the instructions for the donepezil and did not refill after the first month.   No issues with it.     Vit d 28 -     preDM  Lab Results   Component Value Date    HGBA1C 5.9 (H) 05/30/2025    HGBA1C 5.8 (H) 11/06/2023    HGBA1C 5.8 (H) 04/06/2023         ----------------  Stasis dermatitis bilaterally also with tinea pedis  econazole cream.     wrap feet with ace wrap daily, elevate when possible  Cerave or eucerin (cold for itch relief)    CKD 3   Last gfr was 58 and stable     Lipids -   Zocor 20 mg daily     Stress test in 2022: nl EF and no signs of ischemia  Review of Systems   Constitutional:  Negative for fever.   Respiratory:  Negative for shortness of breath.    Cardiovascular:  Negative for chest pain.   Musculoskeletal: Negative.    Skin: Negative.        Objective:   /62 (BP Location: Left arm, Patient Position: Sitting)   Pulse 71   Ht 5' 6" (1.676 m)   Wt 119.4 kg (263 lb 3.7 oz)   SpO2 96%   BMI 42.49 kg/m²      Physical Exam  Constitutional:       General: He is not in acute distress.     Appearance: He is well-developed. He is not diaphoretic.   HENT:      Head: Normocephalic and atraumatic.   Cardiovascular:      Rate and Rhythm: Normal rate and regular rhythm.      Heart sounds: No murmur " heard.  Pulmonary:      Effort: Pulmonary effort is normal. No respiratory distress.      Breath sounds: No wheezing or rales.   Skin:     General: Skin is warm and dry.   Neurological:      Mental Status: He is alert and oriented to person, place, and time.   Psychiatric:         Behavior: Behavior normal.       Lower extremity edema with intact skin  Assessment:       1. Mild dementia without behavioral disturbance, psychotic disturbance, mood disturbance, or anxiety, unspecified dementia type    2. Prediabetes    3. Stage 3a chronic kidney disease    4. Venous stasis    5. Vitamin D insufficiency        Plan:       Mild dementia without behavioral disturbance, psychotic disturbance, mood disturbance, or anxiety, unspecified dementia type  -     donepeziL (ARICEPT) 10 MG tablet; Take 1 tablet (10 mg total) by mouth every evening.  Dispense: 90 tablet; Refill: 3    Prediabetes  Stage 3a chronic kidney disease  - stable and controlled  - continue current regimen    Venous stasis  -     COMPRESSION STOCKINGS  Skin looks great - dermatitis is healed!    Vitamin D insufficiency    -     vitamin D (VITAMIN D3) 1000 units Tab; Take 10 tablets (10,000 Units total) by mouth once a week.  Dispense: 90 tablet; Refill: 3          You are up to date for your primary preventive health care, and there are no reminders at this time.     [employer]

## 2025-07-16 ENCOUNTER — HOSPITAL ENCOUNTER (EMERGENCY)
Facility: HOSPITAL | Age: 89
Discharge: HOME | End: 2025-07-16
Attending: EMERGENCY MEDICINE
Payer: MEDICARE

## 2025-07-16 VITALS
SYSTOLIC BLOOD PRESSURE: 109 MMHG | WEIGHT: 260 LBS | HEIGHT: 66 IN | OXYGEN SATURATION: 97 % | TEMPERATURE: 100 F | DIASTOLIC BLOOD PRESSURE: 55 MMHG | BODY MASS INDEX: 41.78 KG/M2 | RESPIRATION RATE: 19 BRPM | HEART RATE: 81 BPM

## 2025-07-16 DIAGNOSIS — R53.1 GENERALIZED WEAKNESS: ICD-10-CM

## 2025-07-16 LAB
ABSOLUTE EOSINOPHIL (OHS): 0.01 K/UL
ABSOLUTE MONOCYTE (OHS): 0.49 K/UL (ref 0.3–1)
ABSOLUTE NEUTROPHIL COUNT (OHS): 8.81 K/UL (ref 1.8–7.7)
ALBUMIN SERPL BCP-MCNC: 3.9 G/DL (ref 3.5–5.2)
ALP SERPL-CCNC: 78 UNIT/L (ref 40–150)
ALT SERPL W/O P-5'-P-CCNC: 17 UNIT/L (ref 10–44)
ANION GAP (OHS): 7 MMOL/L (ref 8–16)
AST SERPL-CCNC: 17 UNIT/L (ref 11–45)
BACTERIA #/AREA URNS AUTO: ABNORMAL /HPF
BASOPHILS # BLD AUTO: 0.04 K/UL
BASOPHILS NFR BLD AUTO: 0.4 %
BILIRUB SERPL-MCNC: 0.9 MG/DL (ref 0.1–1)
BILIRUB UR QL STRIP.AUTO: NEGATIVE
BNP SERPL-MCNC: 63 PG/ML (ref 0–99)
BUN SERPL-MCNC: 19 MG/DL (ref 8–23)
CALCIUM SERPL-MCNC: 9.5 MG/DL (ref 8.7–10.5)
CHLORIDE SERPL-SCNC: 106 MMOL/L (ref 95–110)
CLARITY UR: CLEAR
CO2 SERPL-SCNC: 21 MMOL/L (ref 23–29)
COLOR UR AUTO: YELLOW
CREAT SERPL-MCNC: 1.2 MG/DL (ref 0.5–1.4)
ERYTHROCYTE [DISTWIDTH] IN BLOOD BY AUTOMATED COUNT: 13.2 % (ref 11.5–14.5)
GFR SERPLBLD CREATININE-BSD FMLA CKD-EPI: 58 ML/MIN/1.73/M2
GLUCOSE SERPL-MCNC: 116 MG/DL (ref 70–110)
GLUCOSE UR QL STRIP: NEGATIVE
HCT VFR BLD AUTO: 43.9 % (ref 40–54)
HGB BLD-MCNC: 14.8 GM/DL (ref 14–18)
HGB UR QL STRIP: ABNORMAL
HYALINE CASTS UR QL AUTO: 0 /LPF (ref 0–1)
IMM GRANULOCYTES # BLD AUTO: 0.04 K/UL (ref 0–0.04)
IMM GRANULOCYTES NFR BLD AUTO: 0.4 % (ref 0–0.5)
INFLUENZA A MOLECULAR (OHS): NEGATIVE
INFLUENZA B MOLECULAR (OHS): NEGATIVE
KETONES UR QL STRIP: NEGATIVE
LEUKOCYTE ESTERASE UR QL STRIP: NEGATIVE
LYMPHOCYTES # BLD AUTO: 0.69 K/UL (ref 1–4.8)
MAGNESIUM SERPL-MCNC: 2.2 MG/DL (ref 1.6–2.6)
MCH RBC QN AUTO: 30.7 PG (ref 27–31)
MCHC RBC AUTO-ENTMCNC: 33.7 G/DL (ref 32–36)
MCV RBC AUTO: 91 FL (ref 82–98)
MICROSCOPIC COMMENT: ABNORMAL
NITRITE UR QL STRIP: NEGATIVE
NUCLEATED RBC (/100WBC) (OHS): 0 /100 WBC
PH UR STRIP: 7 [PH]
PLATELET # BLD AUTO: 276 K/UL (ref 150–450)
PMV BLD AUTO: 9.6 FL (ref 9.2–12.9)
POTASSIUM SERPL-SCNC: 4.2 MMOL/L (ref 3.5–5.1)
PROT SERPL-MCNC: 8 GM/DL (ref 6–8.4)
PROT UR QL STRIP: ABNORMAL
RBC # BLD AUTO: 4.82 M/UL (ref 4.6–6.2)
RBC #/AREA URNS AUTO: 7 /HPF (ref 0–4)
RELATIVE EOSINOPHIL (OHS): 0.1 %
RELATIVE LYMPHOCYTE (OHS): 6.8 % (ref 18–48)
RELATIVE MONOCYTE (OHS): 4.9 % (ref 4–15)
RELATIVE NEUTROPHIL (OHS): 87.4 % (ref 38–73)
SARS-COV-2 RDRP RESP QL NAA+PROBE: NEGATIVE
SODIUM SERPL-SCNC: 134 MMOL/L (ref 136–145)
SP GR UR STRIP: 1.03
SQUAMOUS #/AREA URNS AUTO: <1 /HPF
TROPONIN I SERPL DL<=0.01 NG/ML-MCNC: 0.04 NG/ML
UROBILINOGEN UR STRIP-ACNC: ABNORMAL EU/DL
WBC # BLD AUTO: 10.08 K/UL (ref 3.9–12.7)
WBC #/AREA URNS AUTO: 1 /HPF (ref 0–5)

## 2025-07-16 PROCEDURE — 84484 ASSAY OF TROPONIN QUANT: CPT | Performed by: NURSE PRACTITIONER

## 2025-07-16 PROCEDURE — 93005 ELECTROCARDIOGRAM TRACING: CPT

## 2025-07-16 PROCEDURE — U0002 COVID-19 LAB TEST NON-CDC: HCPCS | Performed by: NURSE PRACTITIONER

## 2025-07-16 PROCEDURE — 81003 URINALYSIS AUTO W/O SCOPE: CPT | Performed by: NURSE PRACTITIONER

## 2025-07-16 PROCEDURE — 99285 EMERGENCY DEPT VISIT HI MDM: CPT | Mod: 25

## 2025-07-16 PROCEDURE — 85025 COMPLETE CBC W/AUTO DIFF WBC: CPT | Performed by: NURSE PRACTITIONER

## 2025-07-16 PROCEDURE — 83880 ASSAY OF NATRIURETIC PEPTIDE: CPT | Performed by: NURSE PRACTITIONER

## 2025-07-16 PROCEDURE — 80053 COMPREHEN METABOLIC PANEL: CPT | Performed by: NURSE PRACTITIONER

## 2025-07-16 PROCEDURE — 87502 INFLUENZA DNA AMP PROBE: CPT

## 2025-07-16 PROCEDURE — 83735 ASSAY OF MAGNESIUM: CPT | Performed by: EMERGENCY MEDICINE

## 2025-07-16 PROCEDURE — 93010 ELECTROCARDIOGRAM REPORT: CPT | Mod: ,,, | Performed by: STUDENT IN AN ORGANIZED HEALTH CARE EDUCATION/TRAINING PROGRAM

## 2025-07-17 NOTE — FIRST PROVIDER EVALUATION
Emergency Department TeleTriage Encounter Note      CHIEF COMPLAINT    Chief Complaint   Patient presents with    Fatigue     Patient's son states that patient's caretaker reported that patient has been weak all day and is unable to walk without assistance. Patient denies any pain, GI symptoms, urinary symptoms, fever. VAN negative in triage. Patient endorses feeling weak and fatigued. Patient is requesting flu and covid test. He states the last time he felt like this he had the flu.       VITAL SIGNS   Initial Vitals [07/16/25 1906]   BP Pulse Resp Temp SpO2   137/61 86 18 99.8 °F (37.7 °C) 96 %      MAP       --            ALLERGIES    Review of patient's allergies indicates:   Allergen Reactions    Bee pollens        PROVIDER TRIAGE NOTE  Son reports the patient had difficulty walking today and had generalized weakness today. Patient states both legs felt like they weren't working. Denies chest pain, SOB, cough, urinary symptoms.     Limited physical exam via telehealth: The patient is awake, alert, answering questions appropriately and is not in respiratory distress.  As the [facility] provider, I performed an initial assessment and ordered appropriate labs and imaging studies, if any, to facilitate the patient's care once placed in the ED. Once a room is available, care and a full evaluation will be completed by an alternate ED provider.  Any additional orders and the final disposition will be determined by that provider.  All imaging and labs will not be followed-up by the [facility] Team, including myself.          ORDERS  Labs Reviewed - No data to display    ED Orders (720h ago, onward)      Start Ordered     Status Ordering Provider    07/16/25 1940 07/16/25 1939  X-Ray Chest AP Portable  1 time imaging         Ordered MONIE COFFEY    07/16/25 1940 07/16/25 1939  Troponin I  STAT         Ordered MONIE COFFEY    07/16/25 1940 07/16/25 1939  Brain natriuretic peptide  STAT         Ordered ALEXX  MONIE JANE.    07/16/25 1940 07/16/25 1940  EKG 12-lead  Once         Ordered DARDARMONIE A.    07/16/25 1939 07/16/25 1938  Urinalysis, Reflex to Urine Culture Urine, Clean Catch  STAT         Ordered MELLISSADARMONIE A.    07/16/25 1939 07/16/25 1938  COVID-19 Rapid Screening  STAT         Ordered MELLISSADARMONIE    07/16/25 1938 07/16/25 1938  Saline lock IV  Once         Ordered DARDAR, MONIE A.    07/16/25 1938 07/16/25 1938  CBC auto differential  Once         Ordered MELLISSADARMONIE ACecily    07/16/25 1938 07/16/25 1938  Comprehensive Metabolic Panel  STAT         Ordered MELLISSADARMONIE.    07/16/25 1938 07/16/25 1938  Influenza antigen Nasopharyngeal Swab  STAT         Ordered MELLISSADAMONIE JOHNSON    07/16/25 1938 07/16/25 1938  CBC with Differential  PROCEDURE ONCE         Ordered MONIE COFFEY              Virtual Visit Note: The provider triage portion of this emergency department evaluation and documentation was performed via [employer], a HIPAA-compliant telemedicine application, in concert with a tele-presenter in the room. A face to face patient evaluation with one of my colleagues will occur once the patient is placed in an emergency department room.      DISCLAIMER: This note was prepared with [employer] voice recognition transcription software. Garbled syntax, mangled pronouns, and other bizarre constructions may be attributed to that software system.

## 2025-07-17 NOTE — ED NOTES
"Walked patient, patient gate steady and even. Patient walks with personal cane. Patient son stated " patient is walking much better than earlier." Patient denies any pain at this time.  "

## 2025-07-17 NOTE — ED TRIAGE NOTES
Patient states that he went out to eat with caretaker tonight and caretaker reported that patient was having difficulty walking, fell as he was getting into car. Required assistance of 2 bystanders to assist from ground. Family member reports that patient has been weaker than usual over last 2 days. Presents awake, alert, oriented. Denies pain or discomfort from fall. Endorses normal appetite with no URI symptoms. Emesis x 1 this morning.

## 2025-07-17 NOTE — ED PROVIDER NOTES
Encounter Date: 7/16/2025       History     Chief Complaint   Patient presents with    Fatigue     Patient's son states that patient's caretaker reported that patient has been weak all day and is unable to walk without assistance. Patient denies any pain, GI symptoms, urinary symptoms, fever. VAN negative in triage. Patient endorses feeling weak and fatigued. Patient is requesting flu and covid test. He states the last time he felt like this he had the flu.     Patient is an 89-year-old male with a history of hyperlipidemia, CKD 3, BPH who presents to the ER with a complaint of generalized fatigue.  Per patient and son at bedside, patient reports generalized fatigue x1 day.  Son reports patient with unsteady gait taking very small steps x1 day which is not his baseline gait. Pt usually able to ambulate without assistance but has required assistance x 1 day.   Son also reports 1 episode of nonbilious nonbloody emesis which the patient corroborates.  Patient denies any current nausea or pain associated with this episode.  Of note, patient was recently started on Aricept within the past 24 hours and did get his shingles vaccination yesterday during a routine PCP visit.  The patient denies any new medication changes other than the aforementioned, any cough, fever, difficulty with urination,  complaints chest pain.      Review of patient's allergies indicates:   Allergen Reactions    Bee pollens      Past Medical History:   Diagnosis Date    BPH with elevated PSA     Cataract     CKD (chronic kidney disease) stage 3, GFR 30-59 ml/min     Fever blister     H/O impaired glucose tolerance 4/2/2014    Hyperlipidemia     Pre-diabetes     Primary osteoarthritis of left hip 11/3/2015     Past Surgical History:   Procedure Laterality Date    THROAT SURGERY  20years    TONSILLECTOMY      VASECTOMY       Family History   Problem Relation Name Age of Onset    Melanoma Neg Hx       Social History[1]  Review of Systems    Constitutional:  Positive for activity change, appetite change and fatigue. Negative for chills and fever.   Respiratory:  Negative for cough, shortness of breath and stridor.    Cardiovascular:  Negative for chest pain, palpitations and leg swelling.   Gastrointestinal:  Positive for nausea (Resolved) and vomiting (Resolved). Negative for abdominal pain and diarrhea.   Genitourinary:  Negative for dysuria and frequency.   Musculoskeletal:  Negative for back pain and myalgias.   Skin:  Negative for rash.   Neurological:  Negative for dizziness, tremors, seizures, syncope, facial asymmetry, speech difficulty, weakness, numbness and headaches.   Psychiatric/Behavioral:  Negative for agitation and confusion.        Physical Exam     Initial Vitals [07/16/25 1906]   BP Pulse Resp Temp SpO2   137/61 86 18 99.8 °F (37.7 °C) 96 %      MAP       --         Physical Exam    Nursing note and vitals reviewed.  Constitutional: He appears well-developed and well-nourished.   HENT:   Head: Normocephalic and atraumatic.   Right Ear: External ear normal.   Left Ear: External ear normal. Mouth/Throat: Oropharynx is clear and moist.   Eyes: EOM are normal. Pupils are equal, round, and reactive to light.   Neck: Neck supple.   Normal range of motion.  Cardiovascular:  Normal rate, regular rhythm and normal heart sounds.           Pulmonary/Chest: Breath sounds normal.   Abdominal: Abdomen is soft. Bowel sounds are normal.   Musculoskeletal:         General: Normal range of motion.      Cervical back: Normal range of motion and neck supple.     Neurological: He is alert and oriented to person, place, and time. He has normal strength. GCS score is 15. GCS eye subscore is 4. GCS verbal subscore is 5. GCS motor subscore is 6.   No focal neurological deficit appreciated; strength 5/5 in all 4 extremities; sensation grossly intact; no aphasia.  No dysarthria. No aphasia; normal gait (no ataxia, no shuffling).    Skin: Skin is warm.  Capillary refill takes less than 2 seconds.   Psychiatric: He has a normal mood and affect. Thought content normal.         ED Course   Procedures  Labs Reviewed   URINALYSIS, REFLEX TO URINE CULTURE - Abnormal       Result Value    Color, UA Yellow      Appearance, UA Clear      pH, UA 7.0      Spec Grav UA 1.030      Protein, UA 1+ (*)     Glucose, UA Negative      Ketones, UA Negative      Bilirubin, UA Negative      Blood, UA 2+ (*)     Nitrites, UA Negative      Urobilinogen, UA 2.0-3.0 (*)     Leukocyte Esterase, UA Negative     CBC WITH DIFFERENTIAL - Abnormal    WBC 10.08      RBC 4.82      HGB 14.8      HCT 43.9      MCV 91      MCH 30.7      MCHC 33.7      RDW 13.2      Platelet Count 276      MPV 9.6      Nucleated RBC 0      Neut % 87.4 (*)     Lymph % 6.8 (*)     Mono % 4.9      Eos % 0.1      Basophil % 0.4      Imm Grans % 0.4      Neut # 8.81 (*)     Lymph # 0.69 (*)     Mono # 0.49      Eos # 0.01      Baso # 0.04      Imm Grans # 0.04     URINALYSIS MICROSCOPIC - Abnormal    RBC, UA 7 (*)     WBC, UA 1      Bacteria, UA None      Squamous Epithelial Cells, UA <1      Hyaline Casts, UA 0      Microscopic Comment       INFLUENZA A & B BY MOLECULAR - Normal    INFLUENZA A MOLECULAR Negative      INFLUENZA B MOLECULAR  Negative     SARS-COV-2 RNA AMPLIFICATION, QUAL - Normal    SARS COV-2 Molecular Negative     CBC W/ AUTO DIFFERENTIAL    Narrative:     The following orders were created for panel order CBC auto differential.  Procedure                               Abnormality         Status                     ---------                               -----------         ------                     CBC with Differential[[id number]]       Abnormal            Final result                 Please view results for these tests on the individual orders.   COMPREHENSIVE METABOLIC PANEL   TROPONIN I   B-TYPE NATRIURETIC PEPTIDE   MAGNESIUM   GREY TOP URINE HOLD          Imaging Results    None           Medications - No data to display  Medical Decision Making  Amount and/or Complexity of Data Reviewed  Labs:  Decision-making details documented in ED Course.  Radiology:  Decision-making details documented in ED Course.               ED Course as of 07/16/25 2230 Wed Jul 16, 2025 2105 Sodium(!): 134 [LC]   2105 RBC, UA(!): 7 [LC]   2105 Influenza A, Molecular: Negative [LC]   2105 Influenza B, Molecular: Negative [LC]   2105 SARS COV-2 MOLECULAR: Negative [LC]   2115 RBC, UA(!): 7 [LC]   2115 Protein, UA(!): 1+ [LC]   2138 Magnesium : 2.2 [LC]   2138 BNP: 63 [LC]   2138 RBC, UA(!): 7 [LC]   2138 WBC, UA: 1 [LC]   2138 Protein, UA(!): 1+ [LC]   2138 SARS COV-2 MOLECULAR: Negative [LC]   2138 Influenza B, Molecular: Negative [LC]   2138 Influenza A, Molecular: Negative [LC]   2208 Patient ambulated with the use of cane which is his baseline.  Per son and nurse caring for the patient the patient did not demonstrate any sort of ataxia or shuffling gait or small steps as initially described.  The son states that he is walking at baseline in the patient states that he feels fine at this time. [LC]   2208 Troponin I(!): 0.038  This appears to be elevated at baseline. [LC]   2229 X-Ray Chest AP Portable  Chronic interstitial change; no acute abnormality per my independent interpretation.  [LC]      ED Course User Index  [LC] Alex Solorzano MD                Medical Decision Making:   History:   I obtained history from: someone other than patient.       <> Summary of History: History also provided by the patient's son at bedside who spent the past day with him.  Initial Assessment:   See HPI   Clinical Tests:   Lab Tests: Reviewed and Ordered  Radiological Study: Ordered and Reviewed  ED Management:  - patient presented with complaints of generalized fatigue time was day with 1 episode of nonbilious nonbloody emesis and son reporting change in the patient's gait causing him to nearly fall.  Patient did not his  head.  He complains of no headache subsequent episodes of nausea or vomiting.  His ED workup is largely unremarkable.  His troponin is mildly elevated but this appears to be baseline.  He does have underlying CKD.  His chest x-ray demonstrates no acute cardiopulmonary process.  Says influenza and COVID swabs were both negative.  His CBC CMP are unremarkable for acute abnormality.  The patient was able to ambulate in the ED without difficulty.  Both he and the son feel confident that his symptoms are likely secondary to recent shingles vaccine requesting discharge.  I see no indication further emergent intervention or workup at this time.  I see no indication for admission at this time.  The patient is neurologically intact acting at baseline in his ED workup is largely unremarkable.  I will discharge him to home at this time per his request.  Patient and son both instructed to have the patient follow up with his PCP in the next 2 3 days if possible for recheck of today's complaints.  Strict ED return precautions given to both the son the patient who both verbalized understanding.  Patient was discharged, per his request,  in stable condition no acute distress.  - No further intervention is indicated at this time after having taken into account the patient's history, physical exam findings, and empirical and objective data obtained during the patient's emergency department workup.   - The patient is at low risk for an emergent medical condition at this time, and I am of the belief that that it is safe to discharge the patient from the emergency department.   - The patient is instructed to follow up as outpatient as indicated on the discharge paperwork.    - I have discussed the specifics of the workup with the patient and the patient has verbalized understanding of the details of the workup, the diagnosis, the treatment plan, and the need for outpatient follow-up.    - Although the patient has no emergent etiology  today this does not preclude the development of an emergent condition so, in addition, I have advised the patient that they can return to the ED and/or activate EMS at any time with worsening of their symptoms, change of their symptoms, or with any other medical complaint.    - The patient remained comfortable and stable during their visit in the ED.    - Discharge and follow-up instructions discussed with the patient who expressed understanding and willingness to comply with my recommendations.  - Results of all emergency department tests  discussed thoroughly with patient; all patient questions answered; pt in agreement with plan  - Pt instructed to follow up with PCP in 2-3 days for recheck of today's complaints  - Pt given strict emergency department return precautions for any new or worsening of symptoms  - Pt discharged from the emergency department in stable condition, in no acute distress                   Clinical Impression:  Final diagnoses:  [R53.1] Generalized weakness                       Alex Solorzano MD  25 [id number]       [1]   Social History  Tobacco Use    Smoking status: Former     Current packs/day: 0.00     Types: Cigarettes     Quit date: 3/8/1969     Years since quittin.3    Smokeless tobacco: Never   Substance Use Topics    Alcohol use: No     Alcohol/week: 0.0 standard drinks of alcohol     Comment: none since age 52    Drug use: No        Alex Solorzano MD  [id number]

## 2025-07-21 LAB
OHS QRS DURATION: 126 MS
OHS QTC CALCULATION: 442 MS

## 2025-07-31 ENCOUNTER — PATIENT MESSAGE (OUTPATIENT)
Dept: INTERNAL MEDICINE | Facility: CLINIC | Age: 89
End: 2025-07-31
Payer: MEDICARE

## 2025-07-31 ENCOUNTER — TELEPHONE (OUTPATIENT)
Dept: INTERNAL MEDICINE | Facility: CLINIC | Age: 89
End: 2025-07-31
Payer: MEDICARE

## 2025-07-31 NOTE — TELEPHONE ENCOUNTER
LOV with Maryjane Adkins MD , 7/15/2025    Please see message from patient's son in regard to patient's disorientation after waking up, trying to call the son with the remote, not remembering if his wife is alive or not.   Please let me know if appointment is needed.

## 2025-07-31 NOTE — TELEPHONE ENCOUNTER
Donepezil has been reported to rarely cause insomnia and strange dreams in about 3% of people. OK to stop donepezil due to this side effect.

## 2025-07-31 NOTE — TELEPHONE ENCOUNTER
Copied from CRM #[id number]. Topic: General Inquiry - Patient Advice  >> Jul 30, 2025  3:41 PM Linnette wrote:  .1MEDICALADVICE     Patient is calling for Medical Advice regarding: pt son would like a call back about the pt having random sleep episodes. Please call and advise. [phone] - Raymundo       How long has patient had these symptoms:    Pharmacy name and phone#:    [facility] provide patient's call back phone number:    Comments:    Please advise patient replies from provider may take up to 48 hours.        Pt son states he left a message via portal is having terrible trouble for the past months sleep pattern he will be in a deep sleep for a good hour and wakes up fully awake but confused and rattle, he states he is waking up freaked out he gets out of bed. Its like his body is awake but his brain isnt aware. Thinks it may be a side of on the medication you just prescribed him

## 2025-08-21 DIAGNOSIS — E78.5 HYPERLIPIDEMIA, UNSPECIFIED HYPERLIPIDEMIA TYPE: ICD-10-CM

## 2025-08-21 RX ORDER — SIMVASTATIN 40 MG/1
TABLET, FILM COATED ORAL
Qty: 45 TABLET | Refills: 3 | Status: SHIPPED | OUTPATIENT
Start: 2025-08-21